# Patient Record
Sex: FEMALE | Race: WHITE | Employment: UNEMPLOYED | URBAN - NONMETROPOLITAN AREA
[De-identification: names, ages, dates, MRNs, and addresses within clinical notes are randomized per-mention and may not be internally consistent; named-entity substitution may affect disease eponyms.]

---

## 2019-12-27 ENCOUNTER — HOSPITAL ENCOUNTER (EMERGENCY)
Age: 54
Discharge: HOME | End: 2019-12-27
Payer: MEDICAID

## 2019-12-27 VITALS
BODY MASS INDEX: 21.14 KG/M2 | DIASTOLIC BLOOD PRESSURE: 60 MMHG | TEMPERATURE: 98.5 F | HEART RATE: 80 BPM | HEIGHT: 61 IN | RESPIRATION RATE: 20 BRPM | WEIGHT: 112 LBS | SYSTOLIC BLOOD PRESSURE: 93 MMHG | OXYGEN SATURATION: 91 %

## 2019-12-27 DIAGNOSIS — N30.00 ACUTE CYSTITIS WITHOUT HEMATURIA: Primary | ICD-10-CM

## 2019-12-27 LAB
ALBUMIN SERPL-MCNC: 3.4 G/DL (ref 3.5–5.2)
ALP BLD-CCNC: 72 U/L (ref 35–104)
ALT SERPL-CCNC: 10 U/L (ref 5–33)
ANION GAP SERPL CALCULATED.3IONS-SCNC: 11 MMOL/L (ref 7–19)
AST SERPL-CCNC: 19 U/L (ref 5–32)
BACTERIA: ABNORMAL /HPF
BASOPHILS ABSOLUTE: 0.1 K/UL (ref 0–0.2)
BASOPHILS RELATIVE PERCENT: 0.9 % (ref 0–1)
BILIRUB SERPL-MCNC: <0.2 MG/DL (ref 0.2–1.2)
BILIRUBIN URINE: NEGATIVE
BLOOD, URINE: ABNORMAL
BUN BLDV-MCNC: 14 MG/DL (ref 6–20)
CALCIUM SERPL-MCNC: 8.8 MG/DL (ref 8.6–10)
CHLORIDE BLD-SCNC: 103 MMOL/L (ref 98–111)
CLARITY: ABNORMAL
CO2: 24 MMOL/L (ref 22–29)
COLOR: ABNORMAL
CREAT SERPL-MCNC: 0.8 MG/DL (ref 0.5–0.9)
EOSINOPHILS ABSOLUTE: 0.2 K/UL (ref 0–0.6)
EOSINOPHILS RELATIVE PERCENT: 3.1 % (ref 0–5)
EPITHELIAL CELLS, UA: 1 /HPF (ref 0–5)
GFR NON-AFRICAN AMERICAN: >60
GLUCOSE BLD-MCNC: 91 MG/DL (ref 74–109)
GLUCOSE URINE: NEGATIVE MG/DL
HCT VFR BLD CALC: 33.6 % (ref 37–47)
HEMOGLOBIN: 10.5 G/DL (ref 12–16)
HYALINE CASTS: 0 /HPF (ref 0–8)
IMMATURE GRANULOCYTES #: 0 K/UL
KETONES, URINE: NEGATIVE MG/DL
LACTIC ACID: 0.8 MMOL/L (ref 0.5–1.9)
LEUKOCYTE ESTERASE, URINE: ABNORMAL
LYMPHOCYTES ABSOLUTE: 2.4 K/UL (ref 1.1–4.5)
LYMPHOCYTES RELATIVE PERCENT: 37.6 % (ref 20–40)
MCH RBC QN AUTO: 28.5 PG (ref 27–31)
MCHC RBC AUTO-ENTMCNC: 31.3 G/DL (ref 33–37)
MCV RBC AUTO: 91.3 FL (ref 81–99)
MONOCYTES ABSOLUTE: 0.7 K/UL (ref 0–0.9)
MONOCYTES RELATIVE PERCENT: 11.2 % (ref 0–10)
NEUTROPHILS ABSOLUTE: 3 K/UL (ref 1.5–7.5)
NEUTROPHILS RELATIVE PERCENT: 46.7 % (ref 50–65)
NITRITE, URINE: POSITIVE
PDW BLD-RTO: 12.7 % (ref 11.5–14.5)
PH UA: 6 (ref 5–8)
PLATELET # BLD: 198 K/UL (ref 130–400)
PMV BLD AUTO: 8.6 FL (ref 9.4–12.3)
POTASSIUM REFLEX MAGNESIUM: 4.5 MMOL/L (ref 3.5–5)
PROTEIN UA: NEGATIVE MG/DL
RBC # BLD: 3.68 M/UL (ref 4.2–5.4)
RBC UA: 3 /HPF (ref 0–4)
SODIUM BLD-SCNC: 138 MMOL/L (ref 136–145)
SPECIFIC GRAVITY UA: 1.02 (ref 1–1.03)
TOTAL PROTEIN: 6.2 G/DL (ref 6.6–8.7)
URINE REFLEX TO CULTURE: YES
UROBILINOGEN, URINE: 0.2 E.U./DL
WBC # BLD: 6.4 K/UL (ref 4.8–10.8)
WBC UA: 267 /HPF (ref 0–5)

## 2019-12-27 PROCEDURE — 81001 URINALYSIS AUTO W/SCOPE: CPT

## 2019-12-27 PROCEDURE — 83605 ASSAY OF LACTIC ACID: CPT

## 2019-12-27 PROCEDURE — 87086 URINE CULTURE/COLONY COUNT: CPT

## 2019-12-27 PROCEDURE — 96374 THER/PROPH/DIAG INJ IV PUSH: CPT

## 2019-12-27 PROCEDURE — 96375 TX/PRO/DX INJ NEW DRUG ADDON: CPT

## 2019-12-27 PROCEDURE — 6360000002 HC RX W HCPCS: Performed by: NURSE PRACTITIONER

## 2019-12-27 PROCEDURE — 99283 EMERGENCY DEPT VISIT LOW MDM: CPT

## 2019-12-27 PROCEDURE — 2580000003 HC RX 258: Performed by: NURSE PRACTITIONER

## 2019-12-27 PROCEDURE — 85025 COMPLETE CBC W/AUTO DIFF WBC: CPT

## 2019-12-27 PROCEDURE — 80053 COMPREHEN METABOLIC PANEL: CPT

## 2019-12-27 PROCEDURE — 87186 SC STD MICRODIL/AGAR DIL: CPT

## 2019-12-27 PROCEDURE — 36415 COLL VENOUS BLD VENIPUNCTURE: CPT

## 2019-12-27 RX ORDER — MORPHINE SULFATE 4 MG/ML
2 INJECTION, SOLUTION INTRAMUSCULAR; INTRAVENOUS ONCE
Status: COMPLETED | OUTPATIENT
Start: 2019-12-27 | End: 2019-12-27

## 2019-12-27 RX ORDER — CEFDINIR 300 MG/1
300 CAPSULE ORAL 2 TIMES DAILY
Qty: 20 CAPSULE | Refills: 0 | Status: SHIPPED | OUTPATIENT
Start: 2019-12-27 | End: 2020-01-06

## 2019-12-27 RX ORDER — ONDANSETRON 4 MG/1
4 TABLET, ORALLY DISINTEGRATING ORAL 3 TIMES DAILY PRN
Qty: 21 TABLET | Refills: 0 | Status: SHIPPED | OUTPATIENT
Start: 2019-12-27 | End: 2021-05-20

## 2019-12-27 RX ORDER — 0.9 % SODIUM CHLORIDE 0.9 %
1000 INTRAVENOUS SOLUTION INTRAVENOUS ONCE
Status: COMPLETED | OUTPATIENT
Start: 2019-12-27 | End: 2019-12-27

## 2019-12-27 RX ORDER — CLONAZEPAM 0.5 MG/1
1 TABLET ORAL 2 TIMES DAILY PRN
COMMUNITY

## 2019-12-27 RX ORDER — ONDANSETRON 2 MG/ML
4 INJECTION INTRAMUSCULAR; INTRAVENOUS ONCE
Status: COMPLETED | OUTPATIENT
Start: 2019-12-27 | End: 2019-12-27

## 2019-12-27 RX ORDER — ZOLPIDEM TARTRATE 10 MG/1
TABLET ORAL NIGHTLY PRN
COMMUNITY
End: 2021-01-10

## 2019-12-27 RX ADMIN — CEFTRIAXONE 1 G: 1 INJECTION, POWDER, FOR SOLUTION INTRAMUSCULAR; INTRAVENOUS at 18:53

## 2019-12-27 RX ADMIN — ONDANSETRON 4 MG: 2 INJECTION INTRAMUSCULAR; INTRAVENOUS at 18:10

## 2019-12-27 RX ADMIN — MORPHINE SULFATE 2 MG: 4 INJECTION, SOLUTION INTRAMUSCULAR; INTRAVENOUS at 18:52

## 2019-12-27 RX ADMIN — SODIUM CHLORIDE 1000 ML: 9 INJECTION, SOLUTION INTRAVENOUS at 18:10

## 2019-12-27 SDOH — HEALTH STABILITY: MENTAL HEALTH: HOW OFTEN DO YOU HAVE A DRINK CONTAINING ALCOHOL?: NEVER

## 2019-12-27 ASSESSMENT — ENCOUNTER SYMPTOMS
SORE THROAT: 0
ALLERGIC/IMMUNOLOGIC NEGATIVE: 1
EYE DISCHARGE: 0
ABDOMINAL DISTENTION: 0
NAUSEA: 1
BLOOD IN STOOL: 0
EYE PAIN: 0
TROUBLE SWALLOWING: 0
PHOTOPHOBIA: 0
SINUS PAIN: 0
SHORTNESS OF BREATH: 0
DIARRHEA: 0
ABDOMINAL PAIN: 0
EYE REDNESS: 0
STRIDOR: 0
VOMITING: 0
BACK PAIN: 0
EYE ITCHING: 0
CONSTIPATION: 0
COLOR CHANGE: 0
WHEEZING: 0
CHEST TIGHTNESS: 0

## 2019-12-27 ASSESSMENT — PAIN SCALES - GENERAL
PAINLEVEL_OUTOF10: 10
PAINLEVEL_OUTOF10: 10

## 2019-12-29 LAB
ORGANISM: ABNORMAL
URINE CULTURE, ROUTINE: ABNORMAL
URINE CULTURE, ROUTINE: ABNORMAL

## 2020-07-18 ENCOUNTER — HOSPITAL ENCOUNTER (EMERGENCY)
Age: 55
Discharge: HOME | End: 2020-07-18
Attending: EMERGENCY MEDICINE
Payer: MEDICAID

## 2020-07-18 VITALS
DIASTOLIC BLOOD PRESSURE: 81 MMHG | HEART RATE: 82 BPM | TEMPERATURE: 98.3 F | SYSTOLIC BLOOD PRESSURE: 104 MMHG | RESPIRATION RATE: 18 BRPM | HEIGHT: 61 IN | BODY MASS INDEX: 19.83 KG/M2 | OXYGEN SATURATION: 98 % | WEIGHT: 105 LBS

## 2020-07-18 PROCEDURE — 99283 EMERGENCY DEPT VISIT LOW MDM: CPT

## 2020-07-18 PROCEDURE — 96372 THER/PROPH/DIAG INJ SC/IM: CPT

## 2020-07-18 PROCEDURE — 6360000002 HC RX W HCPCS: Performed by: EMERGENCY MEDICINE

## 2020-07-18 RX ORDER — CYCLOBENZAPRINE HCL 10 MG
10 TABLET ORAL 3 TIMES DAILY PRN
Qty: 30 TABLET | Refills: 0 | Status: SHIPPED | OUTPATIENT
Start: 2020-07-18 | End: 2020-07-28

## 2020-07-18 RX ORDER — ORPHENADRINE CITRATE 30 MG/ML
60 INJECTION INTRAMUSCULAR; INTRAVENOUS ONCE
Status: COMPLETED | OUTPATIENT
Start: 2020-07-18 | End: 2020-07-18

## 2020-07-18 RX ORDER — TRIAMCINOLONE ACETONIDE 40 MG/ML
40 INJECTION, SUSPENSION INTRA-ARTICULAR; INTRAMUSCULAR ONCE
Status: COMPLETED | OUTPATIENT
Start: 2020-07-18 | End: 2020-07-18

## 2020-07-18 RX ORDER — HYDROCODONE BITARTRATE AND ACETAMINOPHEN 5; 325 MG/1; MG/1
1 TABLET ORAL EVERY 6 HOURS PRN
Qty: 12 TABLET | Refills: 0 | Status: SHIPPED | OUTPATIENT
Start: 2020-07-18 | End: 2020-07-21

## 2020-07-18 RX ORDER — HYDROMORPHONE HYDROCHLORIDE 1 MG/ML
1 INJECTION, SOLUTION INTRAMUSCULAR; INTRAVENOUS; SUBCUTANEOUS ONCE
Status: COMPLETED | OUTPATIENT
Start: 2020-07-18 | End: 2020-07-18

## 2020-07-18 RX ADMIN — HYDROMORPHONE HYDROCHLORIDE 1 MG: 1 INJECTION, SOLUTION INTRAMUSCULAR; INTRAVENOUS; SUBCUTANEOUS at 08:09

## 2020-07-18 RX ADMIN — TRIAMCINOLONE ACETONIDE 40 MG: 40 INJECTION, SUSPENSION INTRA-ARTICULAR; INTRAMUSCULAR at 08:07

## 2020-07-18 RX ADMIN — ORPHENADRINE CITRATE 60 MG: 30 INJECTION INTRAMUSCULAR; INTRAVENOUS at 08:06

## 2020-07-18 ASSESSMENT — PAIN SCALES - GENERAL
PAINLEVEL_OUTOF10: 10
PAINLEVEL_OUTOF10: 10

## 2020-07-18 ASSESSMENT — ENCOUNTER SYMPTOMS
ABDOMINAL PAIN: 0
DIARRHEA: 0
RHINORRHEA: 0
SHORTNESS OF BREATH: 0
SORE THROAT: 0
BACK PAIN: 1
VOMITING: 0
NAUSEA: 0

## 2020-07-18 NOTE — ED NOTES
Bed: 09  Expected date:   Expected time:   Means of arrival:   Comments:     Jaylen Pop RN  07/18/20 8017

## 2020-08-18 ENCOUNTER — OFFICE VISIT (OUTPATIENT)
Age: 55
End: 2020-08-18

## 2020-08-18 VITALS — TEMPERATURE: 98 F | HEART RATE: 91 BPM | OXYGEN SATURATION: 91 %

## 2020-08-19 LAB — SARS-COV-2, NAA: NOT DETECTED

## 2020-09-12 ENCOUNTER — APPOINTMENT (OUTPATIENT)
Dept: CT IMAGING | Age: 55
End: 2020-09-12
Payer: MEDICAID

## 2020-09-12 ENCOUNTER — HOSPITAL ENCOUNTER (EMERGENCY)
Age: 55
Discharge: HOME | End: 2020-09-12
Attending: EMERGENCY MEDICINE
Payer: MEDICAID

## 2020-09-12 VITALS
HEIGHT: 61 IN | DIASTOLIC BLOOD PRESSURE: 71 MMHG | RESPIRATION RATE: 16 BRPM | HEART RATE: 78 BPM | BODY MASS INDEX: 19.83 KG/M2 | TEMPERATURE: 98.6 F | OXYGEN SATURATION: 95 % | SYSTOLIC BLOOD PRESSURE: 130 MMHG | WEIGHT: 105 LBS

## 2020-09-12 LAB
ALBUMIN SERPL-MCNC: 4.1 G/DL (ref 3.5–5.2)
ALP BLD-CCNC: 79 U/L (ref 35–104)
ALT SERPL-CCNC: 9 U/L (ref 5–33)
ANION GAP SERPL CALCULATED.3IONS-SCNC: 8 MMOL/L (ref 7–19)
AST SERPL-CCNC: 20 U/L (ref 5–32)
BACTERIA: ABNORMAL /HPF
BASOPHILS ABSOLUTE: 0.1 K/UL (ref 0–0.2)
BASOPHILS RELATIVE PERCENT: 0.4 % (ref 0–1)
BILIRUB SERPL-MCNC: <0.2 MG/DL (ref 0.2–1.2)
BILIRUBIN URINE: NEGATIVE
BLOOD, URINE: NEGATIVE
BUN BLDV-MCNC: 13 MG/DL (ref 6–20)
CALCIUM SERPL-MCNC: 9.1 MG/DL (ref 8.6–10)
CHLORIDE BLD-SCNC: 104 MMOL/L (ref 98–111)
CLARITY: ABNORMAL
CO2: 27 MMOL/L (ref 22–29)
COLOR: YELLOW
CREAT SERPL-MCNC: 0.7 MG/DL (ref 0.5–0.9)
CRYSTALS, UA: ABNORMAL /HPF
EOSINOPHILS ABSOLUTE: 0.1 K/UL (ref 0–0.6)
EOSINOPHILS RELATIVE PERCENT: 1.1 % (ref 0–5)
GFR AFRICAN AMERICAN: >59
GFR NON-AFRICAN AMERICAN: >60
GLUCOSE BLD-MCNC: 99 MG/DL (ref 74–109)
GLUCOSE URINE: NEGATIVE MG/DL
HCT VFR BLD CALC: 38.8 % (ref 37–47)
HEMOGLOBIN: 12.5 G/DL (ref 12–16)
IMMATURE GRANULOCYTES #: 0.1 K/UL
KETONES, URINE: NEGATIVE MG/DL
LEUKOCYTE ESTERASE, URINE: ABNORMAL
LIPASE: 26 U/L (ref 13–60)
LYMPHOCYTES ABSOLUTE: 2 K/UL (ref 1.1–4.5)
LYMPHOCYTES RELATIVE PERCENT: 15.7 % (ref 20–40)
MCH RBC QN AUTO: 28.2 PG (ref 27–31)
MCHC RBC AUTO-ENTMCNC: 32.2 G/DL (ref 33–37)
MCV RBC AUTO: 87.4 FL (ref 81–99)
MONOCYTES ABSOLUTE: 0.9 K/UL (ref 0–0.9)
MONOCYTES RELATIVE PERCENT: 7.2 % (ref 0–10)
MUCUS: ABNORMAL /LPF
NEUTROPHILS ABSOLUTE: 9.5 K/UL (ref 1.5–7.5)
NEUTROPHILS RELATIVE PERCENT: 75.1 % (ref 50–65)
NITRITE, URINE: NEGATIVE
PDW BLD-RTO: 13.2 % (ref 11.5–14.5)
PH UA: 5.5 (ref 5–8)
PLATELET # BLD: 329 K/UL (ref 130–400)
PMV BLD AUTO: 8.3 FL (ref 9.4–12.3)
POTASSIUM REFLEX MAGNESIUM: 4 MMOL/L (ref 3.5–5)
PROTEIN UA: ABNORMAL MG/DL
RBC # BLD: 4.44 M/UL (ref 4.2–5.4)
RBC UA: ABNORMAL /HPF (ref 0–2)
SODIUM BLD-SCNC: 139 MMOL/L (ref 136–145)
SPECIFIC GRAVITY UA: 1.03 (ref 1–1.03)
TOTAL PROTEIN: 7.1 G/DL (ref 6.6–8.7)
UROBILINOGEN, URINE: 0.2 E.U./DL
WBC # BLD: 12.7 K/UL (ref 4.8–10.8)
WBC UA: ABNORMAL /HPF (ref 0–5)

## 2020-09-12 PROCEDURE — 36415 COLL VENOUS BLD VENIPUNCTURE: CPT

## 2020-09-12 PROCEDURE — 87086 URINE CULTURE/COLONY COUNT: CPT

## 2020-09-12 PROCEDURE — 87186 SC STD MICRODIL/AGAR DIL: CPT

## 2020-09-12 PROCEDURE — 85025 COMPLETE CBC W/AUTO DIFF WBC: CPT

## 2020-09-12 PROCEDURE — 99999 PR OFFICE/OUTPT VISIT,PROCEDURE ONLY: CPT | Performed by: EMERGENCY MEDICINE

## 2020-09-12 PROCEDURE — 81001 URINALYSIS AUTO W/SCOPE: CPT

## 2020-09-12 PROCEDURE — 99283 EMERGENCY DEPT VISIT LOW MDM: CPT

## 2020-09-12 PROCEDURE — 80053 COMPREHEN METABOLIC PANEL: CPT

## 2020-09-12 PROCEDURE — 87077 CULTURE AEROBIC IDENTIFY: CPT

## 2020-09-12 PROCEDURE — 83690 ASSAY OF LIPASE: CPT

## 2020-09-12 ASSESSMENT — ENCOUNTER SYMPTOMS
DIARRHEA: 0
NAUSEA: 1
RHINORRHEA: 0
ABDOMINAL PAIN: 1
COUGH: 0
VOICE CHANGE: 0
EYE REDNESS: 0
VOMITING: 0
EYE PAIN: 0
SHORTNESS OF BREATH: 0

## 2020-09-12 ASSESSMENT — PAIN SCALES - GENERAL: PAINLEVEL_OUTOF10: 5

## 2020-09-12 NOTE — ED PROVIDER NOTES
[facility] EMERGENCY DEPT  EMERGENCY DEPARTMENT ENCOUNTER      Pt Name: Ivette Capellan  MRN: [id number]  [place] 1965  Date of evaluation: 9/12/2020  Provider: Cristhian Vergara MD    CHIEF COMPLAINT       Chief Complaint   Patient presents with    Abdominal Pain     x 1 year worse today. HISTORY OF PRESENT ILLNESS   (Location/Symptom, Timing/Onset,Context/Setting, Quality, Duration, Modifying Factors, Severity)  Note limiting factors. Ivette Capellan is a 48 y.o. female who presents to the emergency department with complaint of epigastric abdominal pain. States she has had pain in this location along with what she feels like was a palpable knot since August of last year. States the pain she is experiencing since last night is in the same location and feels similar but is more severe. Has some associated nausea but no vomiting. No constipation, diarrhea, fever. States pain has typically worsened after eating but otherwise no specific triggers. HPI    NursingNotes were reviewed. REVIEW OF SYSTEMS    (2-9 systems for level 4, 10 or more for level 5)     Review of Systems   Constitutional: Negative for fatigue and fever. HENT: Negative for congestion, rhinorrhea and voice change. Eyes: Negative for pain and redness. Respiratory: Negative for cough and shortness of breath. Cardiovascular: Negative for chest pain. Gastrointestinal: Positive for abdominal pain and nausea. Negative for diarrhea and vomiting. Endocrine: Negative. Genitourinary: Negative. Musculoskeletal: Negative for arthralgias and gait problem. Skin: Negative for rash and wound. Neurological: Negative for weakness and headaches. Hematological: Negative. Psychiatric/Behavioral: Negative. All other systems reviewed and are negative. A complete review of systems was performed and is negative except as noted above in the HPI.        PAST MEDICAL HISTORY     Past Medical History:   Diagnosis Date    COPD (chronic obstructive pulmonary disease) ([facility] [address]     Scoliosis          SURGICAL HISTORY     No past surgical history on file. CURRENT MEDICATIONS       Discharge Medication List as of 9/12/2020  3:31 PM      CONTINUE these medications which have NOT CHANGED    Details   zolpidem (AMBIEN) 10 MG tablet Take by mouth nightly as needed for Sleep. Historical Med      clonazePAM (KLONOPIN) 0.5 MG tablet Take 1 mg by mouth 2 times daily as needed. Historical Med      ondansetron (ZOFRAN-ODT) 4 MG disintegrating tablet Take 1 tablet by mouth 3 times daily as needed for Nausea or Vomiting, Disp-21 tablet, R-0Print             ALLERGIES     Aspirin; Ibuprofen; Iodine; and Sulfa antibiotics    FAMILY HISTORY     No family history on file.        SOCIAL HISTORY       Social History     Socioeconomic History    Marital status:      Spouse name: Not on file    Number of children: Not on file    Years of education: Not on file    Highest education level: Not on file   Occupational History    Not on file   Social Needs    Financial resource strain: Not on file    Food insecurity     Worry: Not on file     Inability: Not on file    Transportation needs     Medical: Not on file     Non-medical: Not on file   Tobacco Use    Smoking status: Current Every Day Smoker     Packs/day: 0.50    Smokeless tobacco: Never Used   Substance and Sexual Activity    Alcohol use: Never     Frequency: Never    Drug use: Never    Sexual activity: Not on file   Lifestyle    Physical activity     Days per week: Not on file     Minutes per session: Not on file    Stress: Not on file   Relationships    Social connections     Talks on phone: Not on file     Gets together: Not on file     Attends Druze service: Not on file     Active member of club or organization: Not on file     Attends meetings of clubs or organizations: Not on file     Relationship status: Not on file    Intimate partner violence     Fear of current or ex partner: Not on file     Emotionally abused: Not on file     Physically abused: Not on file     Forced sexual activity: Not on file   Other Topics Concern    Not on file   Social History Narrative    Not on file       SCREENINGS             PHYSICAL EXAM    (up to 7 for level 4, 8 or more for level 5)     ED Triage Vitals   BP Temp Temp Source Pulse Resp SpO2 Height Weight   09/12/20 1203 09/12/20 1201 09/12/20 1201 09/12/20 1201 09/12/20 1201 09/12/20 1201 09/12/20 1201 09/12/20 1201   130/71 98.6 °F (37 °C) Oral 78 16 95 % 5' 1\" (1.549 m) 105 lb (47.6 kg)       Physical Exam  Vitals signs and nursing note reviewed. Constitutional:       General: She is not in acute distress. Appearance: Normal appearance. She is well-developed. She is not diaphoretic. HENT:      Head: Normocephalic and atraumatic. Mouth/Throat:      Pharynx: No oropharyngeal exudate. Eyes:      General: No scleral icterus. Pupils: Pupils are equal, round, and reactive to light. Neck:      Musculoskeletal: Normal range of motion. Trachea: No tracheal deviation. Cardiovascular:      Rate and Rhythm: Normal rate. Pulses: Normal pulses. Heart sounds: Normal heart sounds. Pulmonary:      Effort: Pulmonary effort is normal.      Breath sounds: Normal breath sounds. No stridor. No wheezing or rhonchi. Abdominal:      General: There is no distension. Palpations: Abdomen is soft. Abdomen is not rigid. Tenderness: There is abdominal tenderness in the epigastric area. There is no guarding. Hernia: No hernia is present. Musculoskeletal:         General: No deformity. Skin:     General: Skin is warm and dry. Findings: No rash. Neurological:      Mental Status: She is alert and oriented to person, place, and time. Cranial Nerves: No cranial nerve deficit. Coordination: Coordination normal.   Psychiatric:         Behavior: Behavior normal.         DIAGNOSTIC RESULTS     EKG:  All mildly elevated. After CT order was placed, patient eloped from the emergency department. CONSULTS:  None    PROCEDURES:  Unless otherwise notedbelow, none     Procedures      FINAL IMPRESSION     1. Abdominal pain, epigastric          DISPOSITION/PLAN   DISPOSITION        PATIENT REFERRED TO:  No follow-up provider specified.     DISCHARGE MEDICATIONS:  Discharge Medication List as of 9/12/2020  3:31 PM             (Please note that portions of this note were completed with a voice recognition program.  Efforts were made to edit the dictations butoccasionally words are mis-transcribed.)    Karo Sibley MD (electronically signed)  AttendingEmergency Physician          Karo Sibley., MD  09/12/20 1340

## 2020-09-12 NOTE — ED NOTES
ASSESSMENT:    SKIN:  Warm, dry, pink. Cap refill < 2 sec  CARDIAC:  S1 S2 noted  LUNGS: clear upper and lower lobes. Respirations even and unlabored. ABDOMEN: bowel sounds noted upper and lower quadrants. Diffuse abd pain, tender x 1 week. Pt co n/v/d. EXTREMITIES: bilateral DP and PT. No edema noted. Pt alert and oriented x4. Pupils equal and reactive. No distress noted. Side rails up and call light within reach.          Shirin Seals RN  09/12/20 0533

## 2020-09-14 LAB
ORGANISM: ABNORMAL
URINE CULTURE, ROUTINE: ABNORMAL
URINE CULTURE, ROUTINE: ABNORMAL

## 2020-09-21 ENCOUNTER — OFFICE VISIT (OUTPATIENT)
Dept: INTERNAL MEDICINE | Facility: CLINIC | Age: 55
End: 2020-09-21

## 2020-09-21 VITALS
HEIGHT: 61 IN | TEMPERATURE: 98.6 F | BODY MASS INDEX: 17.37 KG/M2 | OXYGEN SATURATION: 99 % | DIASTOLIC BLOOD PRESSURE: 62 MMHG | SYSTOLIC BLOOD PRESSURE: 98 MMHG | WEIGHT: 92 LBS | HEART RATE: 82 BPM

## 2020-09-21 DIAGNOSIS — R19.7 DIARRHEA, UNSPECIFIED TYPE: ICD-10-CM

## 2020-09-21 DIAGNOSIS — R11.0 NAUSEA: ICD-10-CM

## 2020-09-21 DIAGNOSIS — R10.9 RIGHT-SIDED ABDOMINAL PAIN OF UNKNOWN ETIOLOGY: Primary | ICD-10-CM

## 2020-09-21 PROBLEM — F41.1 GENERALIZED ANXIETY DISORDER: Status: ACTIVE | Noted: 2020-09-21

## 2020-09-21 PROBLEM — Z86.59 HISTORY OF PANIC ATTACKS: Status: ACTIVE | Noted: 2020-09-21

## 2020-09-21 PROBLEM — J41.0 SIMPLE CHRONIC BRONCHITIS (HCC): Status: ACTIVE | Noted: 2020-09-21

## 2020-09-21 PROBLEM — Z78.0 OSTEOPENIA AFTER MENOPAUSE: Status: ACTIVE | Noted: 2020-09-21

## 2020-09-21 PROBLEM — G47.9 SLEEP DISTURBANCES: Status: ACTIVE | Noted: 2020-09-21

## 2020-09-21 PROBLEM — M85.80 OSTEOPENIA AFTER MENOPAUSE: Status: ACTIVE | Noted: 2020-09-21

## 2020-09-21 LAB
BILIRUB BLD-MCNC: NEGATIVE MG/DL
CLARITY, POC: CLEAR
COLOR UR: YELLOW
GLUCOSE UR STRIP-MCNC: NEGATIVE MG/DL
KETONES UR QL: NEGATIVE
LEUKOCYTE EST, POC: NEGATIVE
NITRITE UR-MCNC: NEGATIVE MG/ML
PH UR: 5.5 [PH] (ref 5–8)
PROT UR STRIP-MCNC: NEGATIVE MG/DL
RBC # UR STRIP: NEGATIVE /UL
SP GR UR: 1.02 (ref 1–1.03)
UROBILINOGEN UR QL: NORMAL

## 2020-09-21 PROCEDURE — 99204 OFFICE O/P NEW MOD 45 MIN: CPT | Performed by: NURSE PRACTITIONER

## 2020-09-21 RX ORDER — CLONAZEPAM 1 MG/1
1 TABLET ORAL 2 TIMES DAILY PRN
COMMUNITY
Start: 2020-07-14 | End: 2020-10-06 | Stop reason: SDUPTHER

## 2020-09-21 RX ORDER — ZOLPIDEM TARTRATE 10 MG/1
1 TABLET ORAL NIGHTLY
COMMUNITY
End: 2020-10-06 | Stop reason: SDUPTHER

## 2020-09-21 RX ORDER — TIOTROPIUM BROMIDE 18 UG/1
1 CAPSULE ORAL; RESPIRATORY (INHALATION) DAILY
COMMUNITY
Start: 2020-06-16 | End: 2020-10-06

## 2020-09-21 RX ORDER — ALBUTEROL SULFATE 90 UG/1
2 AEROSOL, METERED RESPIRATORY (INHALATION) DAILY
COMMUNITY
Start: 2020-07-13 | End: 2020-10-08 | Stop reason: SDUPTHER

## 2020-09-21 RX ORDER — ALENDRONATE SODIUM 70 MG/1
1 TABLET ORAL WEEKLY
COMMUNITY
Start: 2020-06-16 | End: 2020-10-06 | Stop reason: SDUPTHER

## 2020-09-21 NOTE — PROGRESS NOTES
"Subjective   Carol Lawler is a 54 y.o. female.   Chief Complaint   Patient presents with   • Establish Care   • Abdominal Pain     Recent symptoms started about 1 week ago. ER visit on 9/12/20       Mrs. Lawler is a 55 yo female who present to the office today to establish care, but also reports acute abdominal pain, worsening within the last couple of weeks. Patient was seen at [facility] ED 9/12 with persistent abdominal pain, nausea, and pain with eating. Patient's labs indicated a elevated WBC count (12.7) and urine positive for WBC, protein, cloudy color, and culture indicated Klebsiella pneumoniae. Her urinary tract infection was treated with Keflex. Patient however left the ED against medical advice and did not complete CT of abdomen ordered by ED physician. Patient reports similar pain today \" but much worse\". Patient reports that her pain is described as achy, cramping with intermittent sharp, shooting pains from the Right upper quadrant to the Right lower quadrant. Patient reports diarrhea has been intermittent pattern for the last 2 weeks, but worsened over the last several days. She is reporting 10-15 yellow,watery stools. Patient reports that she is only eating baby food, grapes, and apple sauce. Patient reports that she had tried to eat 1 taco with cheese, ground beef, and lettuce and reported severe pain within 20-30 minutes of eating. Patient denies GERD symptoms. Patient denies C.Diff or MRSA infections in the past. Patient denies alcohol use and denies OTC supplementation.       Patient is requesting Klonopin refill today. According to the [facility] record, patient had a refill from her Primary Care physician in [facility], but is seeing Dr. Bowen for suboxone treatment to help her off Klonopin per patient report after further assessed. Patient reports follow up with Dr. Bowen office next week.        The following portions of the patient's history were reviewed and updated as appropriate: allergies, " current medications, past family history, past medical history, past social history, past surgical history and problem list.    Review of Systems   Constitutional: Negative for activity change, appetite change, fatigue, fever, unexpected weight gain and unexpected weight loss.   HENT: Negative for swollen glands, trouble swallowing and voice change.    Eyes: Negative for blurred vision and visual disturbance.   Respiratory: Negative for cough and shortness of breath.    Cardiovascular: Negative for chest pain, palpitations and leg swelling.   Gastrointestinal: Positive for abdominal pain, diarrhea, nausea and indigestion. Negative for constipation and vomiting.   Endocrine: Negative for cold intolerance, heat intolerance, polydipsia and polyphagia.   Genitourinary: Negative for dysuria and frequency.   Musculoskeletal: Negative for arthralgias, back pain, joint swelling and neck pain.   Skin: Negative for color change, rash and skin lesions.   Neurological: Negative for dizziness, weakness, headache, memory problem and confusion.   Hematological: Does not bruise/bleed easily.   Psychiatric/Behavioral: Positive for sleep disturbance. Negative for agitation, hallucinations and suicidal ideas. The patient is nervous/anxious.        Objective   Past Medical History:   Diagnosis Date   • Anxiety    • COPD (chronic obstructive pulmonary disease) (CMS/[facility])    • Insomnia    • Osteoporosis       Past Surgical History:   Procedure Laterality Date   • TONSILLECTOMY     • TUBAL ABDOMINAL LIGATION          Current Outpatient Medications:   •  albuterol sulfate  (90 Base) MCG/ACT inhaler, Inhale 2 puffs Daily., Disp: , Rfl:   •  alendronate (FOSAMAX) 70 MG tablet, Take 1 tablet by mouth 1 (One) Time Per Week., Disp: , Rfl:   •  clonazePAM (KlonoPIN) 1 MG tablet, Take 1 tablet by mouth 2 (Two) Times a Day As Needed., Disp: , Rfl:   •  Spiriva HandiHaler 18 MCG per inhalation capsule, Take 1 puff by mouth Daily., Disp: ,  Rfl:   •  zolpidem (AMBIEN) 10 MG tablet, Take 1 tablet by mouth Every Night., Disp: , Rfl:      Vitals:    09/21/20 1007   BP: 98/62   Pulse: 82   Temp: 98.6 °F (37 °C)   SpO2: 99%         09/21/20  1007   Weight: 41.7 kg (92 lb)     Patient's Body mass index is 17.38 kg/m². BMI is       Physical Exam  Vitals signs and nursing note reviewed.   Constitutional:       General: She is in acute distress.      Appearance: She is well-developed and underweight.   HENT:      Head: Normocephalic and atraumatic.      Right Ear: Hearing and external ear normal.      Left Ear: Hearing and external ear normal.      Nose: Nose normal.      Mouth/Throat:      Lips: Pink. No lesions.      Mouth: Mucous membranes are moist.      Pharynx: Oropharynx is clear. Uvula midline.   Eyes:      Conjunctiva/sclera: Conjunctivae normal.      Pupils: Pupils are equal, round, and reactive to light.   Neck:      Musculoskeletal: Normal range of motion and neck supple.      Thyroid: No thyromegaly.   Cardiovascular:      Rate and Rhythm: Normal rate and regular rhythm.      Pulses: Normal pulses.      Heart sounds: Normal heart sounds.   Pulmonary:      Effort: Pulmonary effort is normal. No tachypnea, accessory muscle usage or respiratory distress.      Breath sounds: Examination of the right-lower field reveals decreased breath sounds. Examination of the left-lower field reveals decreased breath sounds. Decreased breath sounds present.   Abdominal:      General: Abdomen is flat. Bowel sounds are normal.      Palpations: Abdomen is soft.      Tenderness: There is abdominal tenderness in the right upper quadrant, right lower quadrant and epigastric area.      Hernia: No hernia is present.       Lymphadenopathy:      Cervical: No cervical adenopathy.   Skin:     General: Skin is warm and dry.      Capillary Refill: Capillary refill takes less than 2 seconds.   Neurological:      Mental Status: She is alert and oriented to person, place, and time.    Psychiatric:         Attention and Perception: Attention normal.         Mood and Affect: Mood is anxious.         Behavior: Behavior normal. Behavior is cooperative.         Thought Content: Thought content normal.         Assessment/Plan   Diagnoses and all orders for this visit:    1. Right-sided abdominal pain of unknown etiology (Primary)  -     POC Urinalysis Dipstick, Multipro    2. Nausea    3. Diarrhea, unspecified type        Will defer refill on Klonopin to Dr. Bowen and staff. Will have patient go to the ED for acute assessment of abdominal pain related to severity of pain and lack of insurance today. I will have the patient return in 1 week to recheck abdominal pain and complete yearly examination without acute condition present.

## 2020-09-24 NOTE — TELEPHONE ENCOUNTER
Caller: Carol Lawler    Relationship: Self    Best call back number:[phone]    Medication needed:   Requested Prescriptions     Pending Prescriptions Disp Refills   • albuterol sulfate  (90 Base) MCG/ACT inhaler        Sig: Inhale 2 puffs Daily.   • alendronate (FOSAMAX) 70 MG tablet        Sig: Take 1 tablet by mouth 1 (One) Time Per Week.   • clonazePAM (KlonoPIN) 1 MG tablet        Sig: Take 1 tablet by mouth 2 (Two) Times a Day As Needed.   • Spiriva HandiHaler 18 MCG per inhalation capsule        Sig: Daily.   • zolpidem (AMBIEN) 10 MG tablet        Sig: Take 1 tablet by mouth Every Night.       What is the patient's preferred pharmacy: [id number] [place] [facility] [address] [phone] [facility] [phone]

## 2020-09-25 RX ORDER — CLONAZEPAM 1 MG/1
1 TABLET ORAL 2 TIMES DAILY PRN
OUTPATIENT
Start: 2020-09-25

## 2020-09-25 RX ORDER — TIOTROPIUM BROMIDE 18 UG/1
1 CAPSULE ORAL; RESPIRATORY (INHALATION) DAILY
Qty: 30 CAPSULE | Refills: 5 | OUTPATIENT
Start: 2020-09-25

## 2020-09-25 RX ORDER — ALENDRONATE SODIUM 70 MG/1
70 TABLET ORAL WEEKLY
Qty: 4 TABLET | Refills: 11 | OUTPATIENT
Start: 2020-09-25

## 2020-09-25 RX ORDER — ZOLPIDEM TARTRATE 10 MG/1
10 TABLET ORAL NIGHTLY
OUTPATIENT
Start: 2020-09-25

## 2020-09-25 RX ORDER — ALBUTEROL SULFATE 90 UG/1
2 AEROSOL, METERED RESPIRATORY (INHALATION) DAILY
Qty: 18 G | Refills: 3 | OUTPATIENT
Start: 2020-09-25

## 2020-10-06 ENCOUNTER — OFFICE VISIT (OUTPATIENT)
Dept: INTERNAL MEDICINE | Facility: CLINIC | Age: 55
End: 2020-10-06

## 2020-10-06 VITALS
DIASTOLIC BLOOD PRESSURE: 78 MMHG | TEMPERATURE: 97.6 F | OXYGEN SATURATION: 98 % | SYSTOLIC BLOOD PRESSURE: 128 MMHG | BODY MASS INDEX: 17.03 KG/M2 | RESPIRATION RATE: 16 BRPM | HEART RATE: 72 BPM | WEIGHT: 90.2 LBS | HEIGHT: 61 IN

## 2020-10-06 DIAGNOSIS — Z12.31 ENCOUNTER FOR SCREENING MAMMOGRAM FOR MALIGNANT NEOPLASM OF BREAST: ICD-10-CM

## 2020-10-06 DIAGNOSIS — F41.1 GENERALIZED ANXIETY DISORDER: ICD-10-CM

## 2020-10-06 DIAGNOSIS — F51.04 PSYCHOPHYSIOLOGICAL INSOMNIA: ICD-10-CM

## 2020-10-06 DIAGNOSIS — Z00.01 ANNUAL VISIT FOR GENERAL ADULT MEDICAL EXAMINATION WITH ABNORMAL FINDINGS: ICD-10-CM

## 2020-10-06 DIAGNOSIS — R10.13 EPIGASTRIC PAIN: Primary | ICD-10-CM

## 2020-10-06 DIAGNOSIS — R10.13 DYSPEPSIA: ICD-10-CM

## 2020-10-06 DIAGNOSIS — R13.19 ESOPHAGEAL DYSPHAGIA: ICD-10-CM

## 2020-10-06 DIAGNOSIS — J41.0 SIMPLE CHRONIC BRONCHITIS (HCC): ICD-10-CM

## 2020-10-06 DIAGNOSIS — M81.6 LOCALIZED OSTEOPOROSIS WITHOUT CURRENT PATHOLOGICAL FRACTURE: ICD-10-CM

## 2020-10-06 PROCEDURE — 99204 OFFICE O/P NEW MOD 45 MIN: CPT | Performed by: INTERNAL MEDICINE

## 2020-10-06 RX ORDER — ZOLPIDEM TARTRATE 10 MG/1
5-10 TABLET ORAL NIGHTLY
Qty: 30 TABLET | Refills: 1 | Status: SHIPPED | OUTPATIENT
Start: 2020-10-06 | End: 2020-12-07 | Stop reason: SDUPTHER

## 2020-10-06 RX ORDER — SERTRALINE HYDROCHLORIDE 25 MG/1
25 TABLET, FILM COATED ORAL DAILY
Qty: 30 TABLET | Refills: 3 | Status: SHIPPED | OUTPATIENT
Start: 2020-10-06 | End: 2020-12-31

## 2020-10-06 RX ORDER — CLONAZEPAM 1 MG/1
.5-1 TABLET ORAL NIGHTLY PRN
Qty: 30 TABLET | Refills: 1 | Status: SHIPPED | OUTPATIENT
Start: 2020-10-06 | End: 2020-12-07 | Stop reason: SDUPTHER

## 2020-10-06 RX ORDER — TIOTROPIUM BROMIDE INHALATION SPRAY 3.12 UG/1
2 SPRAY, METERED RESPIRATORY (INHALATION)
Qty: 4 G | Refills: 5 | Status: SHIPPED | OUTPATIENT
Start: 2020-10-06 | End: 2021-01-14 | Stop reason: SDUPTHER

## 2020-10-06 RX ORDER — ALENDRONATE SODIUM 70 MG/1
70 TABLET ORAL WEEKLY
Qty: 4 TABLET | Refills: 11 | Status: SHIPPED | OUTPATIENT
Start: 2020-10-06 | End: 2021-05-28 | Stop reason: SDUPTHER

## 2020-10-06 RX ORDER — BUPRENORPHINE HYDROCHLORIDE AND NALOXONE HYDROCHLORIDE DIHYDRATE 8; 2 MG/1; MG/1
1 TABLET SUBLINGUAL 3 TIMES DAILY
COMMUNITY
End: 2020-12-31

## 2020-10-06 RX ORDER — OMEPRAZOLE 20 MG/1
20 CAPSULE, DELAYED RELEASE ORAL DAILY
Qty: 14 CAPSULE | Refills: 0 | Status: SHIPPED | OUTPATIENT
Start: 2020-10-06 | End: 2020-12-31 | Stop reason: SDUPTHER

## 2020-10-06 NOTE — PROGRESS NOTES
CC: epigastric pain    History:  Carol Lawler is a 55 y.o. female who presents today for evaluation of the above problems.      She notes she has been doing reasonably well, but has had epigastric pain that waxes and wanes in intensity, but is always worsened by eating.  Certain foods like Mexican always make it worse.  She has had some symptoms of reflux and has had dysphagia, but no odynophagia.  Her weight has been low, but consistent and she denies melena.  She has never taken anything for this and does not note any worsening of her stomach pain with administration of her medications.    She recently moved here from [place] and has transferred her care to Dr. Bowen with regard to her Suboxone.  She had previously used opioids, but had used Suboxone to transition off of these.    Her anxiety has done reasonably well with Klonopin and she notes she has been on this as monotherapy for several years.  She notes she had tried BuSpar and Paxil in the past without relief.    She was noted to have osteoporosis in the past and has been on Fosamax for approximately 2 years.        ROS:  Review of Systems   Constitutional: Negative for chills and fever.   HENT: Negative for congestion and sore throat.    Eyes: Negative for visual disturbance.   Respiratory: Negative for cough and shortness of breath.    Cardiovascular: Negative for chest pain and palpitations.   Gastrointestinal: Positive for abdominal pain. Negative for blood in stool, constipation, diarrhea and nausea.   Endocrine: Negative for cold intolerance and heat intolerance.   Genitourinary: Negative for difficulty urinating and frequency.   Musculoskeletal: Negative for arthralgias and back pain.   Skin: Negative for rash.   Neurological: Negative for dizziness and headaches.   Psychiatric/Behavioral: Negative for dysphoric mood. The patient is nervous/anxious.        Allergies   Allergen Reactions   • Aspirin Hives   • Ibuprofen Unknown - Low Severity   •  "Iodine Unknown - Low Severity   • Sulfa Antibiotics Unknown - Low Severity     Past Medical History:   Diagnosis Date   • Anxiety    • COPD (chronic obstructive pulmonary disease) (CMS/HCC)    • Insomnia    • Osteoporosis      Past Surgical History:   Procedure Laterality Date   • TONSILLECTOMY     • TUBAL ABDOMINAL LIGATION       Family History   Problem Relation Age of Onset   • Diabetes Mother    • Hypertension Mother    • Hyperlipidemia Mother    • Cancer Mother    • Anxiety disorder Father       reports that she has been smoking cigarettes. She has a 30.00 pack-year smoking history. She has never used smokeless tobacco. She reports that she does not drink alcohol or use drugs.      Current Outpatient Medications:   •  albuterol sulfate  (90 Base) MCG/ACT inhaler, Inhale 2 puffs Daily., Disp: , Rfl:   •  alendronate (FOSAMAX) 70 MG tablet, Take 1 tablet by mouth 1 (One) Time Per Week., Disp: , Rfl:   •  buprenorphine-naloxone (SUBOXONE) 8-2 MG per SL tablet, Place 1 tablet under the tongue 3 (Three) Times a Day. 1/2 tablet TID, Disp: , Rfl:   •  clonazePAM (KlonoPIN) 1 MG tablet, Take 1 tablet by mouth 2 (Two) Times a Day As Needed., Disp: , Rfl:   •  Spiriva HandiHaler 18 MCG per inhalation capsule, Take 1 puff by mouth Daily., Disp: , Rfl:   •  zolpidem (AMBIEN) 10 MG tablet, Take 1 tablet by mouth Every Night., Disp: , Rfl:     OBJECTIVE:  /78 (BP Location: Left arm, Patient Position: Sitting, Cuff Size: Adult)   Pulse 72   Temp 97.6 °F (36.4 °C)   Resp 16   Ht 154.9 cm (61\")   Wt 40.9 kg (90 lb 3.2 oz)   SpO2 98%   Breastfeeding No   BMI 17.04 kg/m²    Physical Exam  Constitutional:       General: She is not in acute distress.     Appearance: She is well-developed.   HENT:      Head: Normocephalic and atraumatic.      Right Ear: External ear normal.      Left Ear: External ear normal.   Eyes:      General: No scleral icterus.     Extraocular Movements: Extraocular movements intact. "   Neck:      Musculoskeletal: Normal range of motion and neck supple.      Trachea: No tracheal deviation.   Cardiovascular:      Rate and Rhythm: Normal rate and regular rhythm.      Heart sounds: Normal heart sounds. No murmur.   Pulmonary:      Effort: Pulmonary effort is normal. No accessory muscle usage or respiratory distress.      Breath sounds: Normal breath sounds. No wheezing.   Abdominal:      General: Abdomen is flat. Bowel sounds are normal. There is no distension.      Palpations: Abdomen is soft. There is no mass.      Tenderness: There is abdominal tenderness.   Musculoskeletal: Normal range of motion.      Right lower leg: No edema.      Left lower leg: No edema.   Skin:     General: Skin is warm and dry.      Nails: There is no clubbing.     Neurological:      Mental Status: She is alert and oriented to person, place, and time.      Coordination: Coordination normal.      Gait: Gait normal.   Psychiatric:         Mood and Affect: Mood normal. Mood is not anxious or depressed.         Behavior: Behavior normal.         Assessment/Plan     Diagnoses and all orders for this visit:    Epigastric pain  Dyspepsia  Esophageal dysphagia  -     Ambulatory Referral to Gastroenterology  -     omeprazole (PrilOSEC) 20 MG capsule; Take 1 capsule by mouth Daily.  We will attempt a diagnostic and therapeutic trial with omeprazole.  However, given pain with dysphagia, we will refer to gastroenterology for consideration of probable EGD.    Simple chronic bronchitis (CMS/HCC)  -     tiotropium bromide monohydrate (Spiriva Respimat) 2.5 MCG/ACT aerosol solution inhaler; Inhale 2 puffs Daily.  She has done reasonably well on Spiriva.  Consider PFTs for further characterization if needed.    Generalized anxiety disorder  -     sertraline (Zoloft) 25 MG tablet; Take 1 tablet by mouth Daily.  -     clonazePAM (KlonoPIN) 1 MG tablet; Take 0.5-1 tablets by mouth At Night As Needed for Anxiety.  We discussed that I do not  recommend monotherapy with benzodiazepines given risk for dependence, legal issues, but especially given medical risk for early onset dementia.  As such, we will initiate therapy with sertraline and aim to wean her clonazepam.  Notably, she indicates she uses the clonazepam only at night to help with sleep and has not used it twice daily as previously prescribed. We discussed the controlled nature of this prescription and the necessity of obtaining from only our office, filling at the same pharmacy, and taking only as directed. We will aim to wean over several months. Regarding sertraline, I advised that it will take 3-4 weeks to see some effect and 6-8 weeks to see full effect.  If the dose is ineffective or suboptimal, the patient should notify the clinic for a consideration of a dose increase. The patient denied SI/HI, but was advised that starting this medication could worsen these symptoms. We discussed this as an emergency and reason to seek care immediately. The patient expressed understanding.     Psychophysiological insomnia  -     clonazePAM (KlonoPIN) 1 MG tablet; Take 0.5-1 tablets by mouth At Night As Needed for Anxiety.  -     zolpidem (AMBIEN) 10 MG tablet; Take 0.5-1 tablets by mouth Every Night.  PDMP data was reviewed, but inaccurate in the chart.  As a result, Benja was reviewed with the last prescriptions of clonazepam and Ambien coming from her physician in [place].  We are seeking records from her doctor in [place], [place]. as above, we will prescribe clonazepam and plan to wean.  She may also try to wean her Ambien down to 1/2 tablet if tolerable.    Localized osteoporosis without current pathological fracture  -     alendronate (FOSAMAX) 70 MG tablet; Take 1 tablet by mouth 1 (One) Time Per Week.  Continue Fosamax and we are seeking records from her previous physician, Dr. Trent regarding DEXA.  Plan for 5 years of bisphosphonate therapy.    Encounter for screening mammogram for malignant  neoplasm of breast  -     Mammo Screening Digital Tomosynthesis Bilateral With CAD; Future    Annual visit for general adult medical examination with abnormal findings  -     Comprehensive Metabolic Panel; Future  -     Hemoglobin A1c; Future  -     Lipid Panel; Future  -     Hepatitis C Antibody; Future  -     CBC (No Diff); Future         An After Visit Summary was printed and given to the patient at discharge.  Return in about 2 months (around 12/6/2020) for Annual physical with me.         Manuelito Griffin D.O. 10/7/2020   Electronically signed.

## 2020-10-08 ENCOUNTER — TELEPHONE (OUTPATIENT)
Dept: INTERNAL MEDICINE | Facility: CLINIC | Age: 55
End: 2020-10-08

## 2020-10-08 DIAGNOSIS — J41.0 SIMPLE CHRONIC BRONCHITIS (HCC): Primary | ICD-10-CM

## 2020-10-08 RX ORDER — ALBUTEROL SULFATE 90 UG/1
2 AEROSOL, METERED RESPIRATORY (INHALATION) DAILY
Qty: 8 G | Refills: 11 | OUTPATIENT
Start: 2020-10-08 | End: 2020-10-15

## 2020-10-08 NOTE — TELEPHONE ENCOUNTER
PT SAYS THAT SHE NEEDS HER ALBUTEROL INHALER SENT TO [facility]. SHE SAID THAT THE SPIRIVA IS NOT WORKING.

## 2020-10-09 NOTE — TELEPHONE ENCOUNTER
PATIENT HAS BEEN CALLED, HER FATHER ANSWERED AND STATED HE WOULD LET HER KNOW TO CHECK THE PHARMACY.

## 2020-10-15 ENCOUNTER — HOSPITAL ENCOUNTER (EMERGENCY)
Facility: HOSPITAL | Age: 55
Discharge: HOME | End: 2020-10-15
Attending: EMERGENCY MEDICINE | Admitting: EMERGENCY MEDICINE

## 2020-10-15 ENCOUNTER — APPOINTMENT (OUTPATIENT)
Dept: GENERAL RADIOLOGY | Facility: HOSPITAL | Age: 55
End: 2020-10-15

## 2020-10-15 VITALS
DIASTOLIC BLOOD PRESSURE: 56 MMHG | BODY MASS INDEX: 17.37 KG/M2 | OXYGEN SATURATION: 93 % | HEART RATE: 82 BPM | WEIGHT: 92 LBS | HEIGHT: 61 IN | RESPIRATION RATE: 14 BRPM | SYSTOLIC BLOOD PRESSURE: 102 MMHG | TEMPERATURE: 98.3 F

## 2020-10-15 DIAGNOSIS — J44.1 COPD EXACERBATION (HCC): Primary | ICD-10-CM

## 2020-10-15 DIAGNOSIS — J20.9 ACUTE BRONCHITIS, UNSPECIFIED ORGANISM: ICD-10-CM

## 2020-10-15 LAB
ALBUMIN SERPL-MCNC: 4.2 G/DL (ref 3.5–5.2)
ALBUMIN/GLOB SERPL: 1.2 G/DL
ALP SERPL-CCNC: 99 U/L (ref 39–117)
ALT SERPL W P-5'-P-CCNC: 11 U/L (ref 1–33)
ANION GAP SERPL CALCULATED.3IONS-SCNC: 10 MMOL/L (ref 5–15)
ARTERIAL PATENCY WRIST A: ABNORMAL
AST SERPL-CCNC: 19 U/L (ref 1–32)
ATMOSPHERIC PRESS: 753 MMHG
BASE EXCESS BLDA CALC-SCNC: 0.9 MMOL/L (ref 0–2)
BASOPHILS # BLD AUTO: 0.04 10*3/MM3 (ref 0–0.2)
BASOPHILS NFR BLD AUTO: 0.3 % (ref 0–1.5)
BDY SITE: ABNORMAL
BILIRUB SERPL-MCNC: 0.3 MG/DL (ref 0–1.2)
BODY TEMPERATURE: 37 C
BUN SERPL-MCNC: 14 MG/DL (ref 6–20)
BUN/CREAT SERPL: 18.7 (ref 7–25)
CALCIUM SPEC-SCNC: 9.4 MG/DL (ref 8.6–10.5)
CHLORIDE SERPL-SCNC: 103 MMOL/L (ref 98–107)
CO2 SERPL-SCNC: 26 MMOL/L (ref 22–29)
CREAT SERPL-MCNC: 0.75 MG/DL (ref 0.57–1)
D DIMER PPP FEU-MCNC: 0.47 MG/L (FEU) (ref 0–0.5)
D-LACTATE SERPL-SCNC: 1 MMOL/L (ref 0.5–2)
DEPRECATED RDW RBC AUTO: 42.4 FL (ref 37–54)
EOSINOPHIL # BLD AUTO: 0.09 10*3/MM3 (ref 0–0.4)
EOSINOPHIL NFR BLD AUTO: 0.7 % (ref 0.3–6.2)
ERYTHROCYTE [DISTWIDTH] IN BLOOD BY AUTOMATED COUNT: 13.2 % (ref 12.3–15.4)
GFR SERPL CREATININE-BSD FRML MDRD: 81 ML/MIN/1.73
GLOBULIN UR ELPH-MCNC: 3.6 GM/DL
GLUCOSE SERPL-MCNC: 76 MG/DL (ref 65–99)
HCO3 BLDA-SCNC: 26.6 MMOL/L (ref 20–26)
HCT VFR BLD AUTO: 36.8 % (ref 34–46.6)
HGB BLD-MCNC: 11.9 G/DL (ref 12–15.9)
IMM GRANULOCYTES # BLD AUTO: 0.03 10*3/MM3 (ref 0–0.05)
IMM GRANULOCYTES NFR BLD AUTO: 0.2 % (ref 0–0.5)
LYMPHOCYTES # BLD AUTO: 2.45 10*3/MM3 (ref 0.7–3.1)
LYMPHOCYTES NFR BLD AUTO: 20.4 % (ref 19.6–45.3)
Lab: ABNORMAL
MCH RBC QN AUTO: 28.6 PG (ref 26.6–33)
MCHC RBC AUTO-ENTMCNC: 32.3 G/DL (ref 31.5–35.7)
MCV RBC AUTO: 88.5 FL (ref 79–97)
MODALITY: ABNORMAL
MONOCYTES # BLD AUTO: 1.03 10*3/MM3 (ref 0.1–0.9)
MONOCYTES NFR BLD AUTO: 8.6 % (ref 5–12)
NEUTROPHILS NFR BLD AUTO: 69.8 % (ref 42.7–76)
NEUTROPHILS NFR BLD AUTO: 8.39 10*3/MM3 (ref 1.7–7)
NRBC BLD AUTO-RTO: 0 /100 WBC (ref 0–0.2)
NT-PROBNP SERPL-MCNC: 113.3 PG/ML (ref 0–900)
PCO2 BLDA: 46.1 MM HG (ref 35–45)
PCO2 TEMP ADJ BLD: 46.1 MM HG (ref 35–45)
PH BLDA: 7.37 PH UNITS (ref 7.35–7.45)
PH, TEMP CORRECTED: 7.37 PH UNITS (ref 7.35–7.45)
PLATELET # BLD AUTO: 299 10*3/MM3 (ref 140–450)
PMV BLD AUTO: 8.5 FL (ref 6–12)
PO2 BLDA: 57.8 MM HG (ref 83–108)
PO2 TEMP ADJ BLD: 57.8 MM HG (ref 83–108)
POTASSIUM SERPL-SCNC: 3.9 MMOL/L (ref 3.5–5.2)
PROT SERPL-MCNC: 7.8 G/DL (ref 6–8.5)
RBC # BLD AUTO: 4.16 10*6/MM3 (ref 3.77–5.28)
SAO2 % BLDCOA: 91.4 % (ref 94–99)
SARS-COV-2 RDRP RESP QL NAA+PROBE: NOT DETECTED
SODIUM SERPL-SCNC: 139 MMOL/L (ref 136–145)
TROPONIN T SERPL-MCNC: <0.01 NG/ML (ref 0–0.03)
VENTILATOR MODE: ABNORMAL
WBC # BLD AUTO: 12.03 10*3/MM3 (ref 3.4–10.8)

## 2020-10-15 PROCEDURE — 85025 COMPLETE CBC W/AUTO DIFF WBC: CPT | Performed by: EMERGENCY MEDICINE

## 2020-10-15 PROCEDURE — 36415 COLL VENOUS BLD VENIPUNCTURE: CPT

## 2020-10-15 PROCEDURE — 96374 THER/PROPH/DIAG INJ IV PUSH: CPT

## 2020-10-15 PROCEDURE — 87635 SARS-COV-2 COVID-19 AMP PRB: CPT | Performed by: EMERGENCY MEDICINE

## 2020-10-15 PROCEDURE — 25010000002 METHYLPREDNISOLONE PER 125 MG: Performed by: EMERGENCY MEDICINE

## 2020-10-15 PROCEDURE — 82803 BLOOD GASES ANY COMBINATION: CPT

## 2020-10-15 PROCEDURE — 84484 ASSAY OF TROPONIN QUANT: CPT | Performed by: EMERGENCY MEDICINE

## 2020-10-15 PROCEDURE — 36600 WITHDRAWAL OF ARTERIAL BLOOD: CPT

## 2020-10-15 PROCEDURE — 99283 EMERGENCY DEPT VISIT LOW MDM: CPT

## 2020-10-15 PROCEDURE — 87040 BLOOD CULTURE FOR BACTERIA: CPT | Performed by: EMERGENCY MEDICINE

## 2020-10-15 PROCEDURE — 71045 X-RAY EXAM CHEST 1 VIEW: CPT

## 2020-10-15 PROCEDURE — 94799 UNLISTED PULMONARY SVC/PX: CPT

## 2020-10-15 PROCEDURE — 83880 ASSAY OF NATRIURETIC PEPTIDE: CPT | Performed by: EMERGENCY MEDICINE

## 2020-10-15 PROCEDURE — 80053 COMPREHEN METABOLIC PANEL: CPT | Performed by: EMERGENCY MEDICINE

## 2020-10-15 PROCEDURE — 94640 AIRWAY INHALATION TREATMENT: CPT

## 2020-10-15 PROCEDURE — 83605 ASSAY OF LACTIC ACID: CPT | Performed by: EMERGENCY MEDICINE

## 2020-10-15 PROCEDURE — 85379 FIBRIN DEGRADATION QUANT: CPT | Performed by: EMERGENCY MEDICINE

## 2020-10-15 RX ORDER — METHYLPREDNISOLONE SODIUM SUCCINATE 125 MG/2ML
125 INJECTION, POWDER, LYOPHILIZED, FOR SOLUTION INTRAMUSCULAR; INTRAVENOUS ONCE
Status: COMPLETED | OUTPATIENT
Start: 2020-10-15 | End: 2020-10-15

## 2020-10-15 RX ORDER — IPRATROPIUM BROMIDE AND ALBUTEROL SULFATE 2.5; .5 MG/3ML; MG/3ML
3 SOLUTION RESPIRATORY (INHALATION) ONCE
Status: COMPLETED | OUTPATIENT
Start: 2020-10-15 | End: 2020-10-15

## 2020-10-15 RX ORDER — METHYLPREDNISOLONE 4 MG/1
TABLET ORAL
Qty: 21 TABLET | Refills: 0 | Status: SHIPPED | OUTPATIENT
Start: 2020-10-15 | End: 2020-12-31

## 2020-10-15 RX ORDER — ALBUTEROL SULFATE 90 UG/1
2 AEROSOL, METERED RESPIRATORY (INHALATION) EVERY 4 HOURS PRN
Qty: 1 G | Refills: 0 | Status: SHIPPED | OUTPATIENT
Start: 2020-10-15 | End: 2020-12-31 | Stop reason: SDUPTHER

## 2020-10-15 RX ORDER — BROMPHENIRAMINE MALEATE, PSEUDOEPHEDRINE HYDROCHLORIDE, AND DEXTROMETHORPHAN HYDROBROMIDE 2; 30; 10 MG/5ML; MG/5ML; MG/5ML
5 SYRUP ORAL 4 TIMES DAILY PRN
Qty: 160 ML | Refills: 0 | Status: SHIPPED | OUTPATIENT
Start: 2020-10-15 | End: 2020-12-31

## 2020-10-15 RX ORDER — AZITHROMYCIN 250 MG/1
TABLET, FILM COATED ORAL
Qty: 6 TABLET | Refills: 0 | Status: SHIPPED | OUTPATIENT
Start: 2020-10-15 | End: 2020-12-31

## 2020-10-15 RX ADMIN — IPRATROPIUM BROMIDE AND ALBUTEROL SULFATE 3 ML: 2.5; .5 SOLUTION RESPIRATORY (INHALATION) at 17:00

## 2020-10-15 RX ADMIN — METHYLPREDNISOLONE SODIUM SUCCINATE 125 MG: 125 INJECTION, POWDER, FOR SOLUTION INTRAMUSCULAR; INTRAVENOUS at 16:59

## 2020-10-15 NOTE — ED PROVIDER NOTES
Subjective   Patient came to the ER complaining of shortness of breath cough congestion flulike symptoms no other complaints will see with this.  There is no fever there is nausea no vomiting.      Flu Symptoms  Presenting symptoms: cough, myalgias and shortness of breath    Presenting symptoms: no diarrhea, no fatigue, no fever, no headaches, no nausea, no rhinorrhea, no sore throat and no vomiting    Severity:  Moderate  Onset quality:  Gradual  Progression:  Worsening  Chronicity:  New  Relieved by:  Nothing  Worsened by:  Nothing  Ineffective treatments:  None tried  Associated symptoms: no chills, no decreased appetite, no decrease in physical activity, no ear pain, no congestion, no neck stiffness and no syncope    Risk factors: not elderly, no diabetes problem, no heart disease, no kidney disease and no sick contacts        Review of Systems   Constitutional: Negative.  Negative for activity change, appetite change, chills, decreased appetite, diaphoresis, fatigue and fever.   HENT: Negative for congestion, drooling, ear pain, facial swelling, hearing loss, rhinorrhea, sinus pressure and sore throat.    Eyes: Negative.  Negative for discharge.   Respiratory: Positive for cough and shortness of breath.    Gastrointestinal: Negative for abdominal distention, abdominal pain, blood in stool, diarrhea, nausea and vomiting.   Endocrine: Negative.  Negative for cold intolerance, heat intolerance, polydipsia, polyphagia and polyuria.   Genitourinary: Negative.  Negative for dysuria, flank pain and urgency.   Musculoskeletal: Positive for myalgias. Negative for arthralgias, back pain and neck stiffness.   Skin: Negative.  Negative for color change, pallor and rash.   Allergic/Immunologic: Negative.    Neurological: Negative.  Negative for dizziness, seizures, speech difficulty, weakness, numbness and headaches.   Hematological: Negative.  Negative for adenopathy.   All other systems reviewed and are  negative.      Past Medical History:   Diagnosis Date   • Anxiety    • COPD (chronic obstructive pulmonary disease) (CMS/HCC)    • Insomnia    • Osteoporosis        Allergies   Allergen Reactions   • Aspirin Hives   • Ibuprofen Unknown - Low Severity   • Iodine Unknown - Low Severity   • Sulfa Antibiotics Unknown - Low Severity       Past Surgical History:   Procedure Laterality Date   • TONSILLECTOMY     • TUBAL ABDOMINAL LIGATION         Family History   Problem Relation Age of Onset   • Diabetes Mother    • Hypertension Mother    • Hyperlipidemia Mother    • Cancer Mother    • Anxiety disorder Father        Social History     Socioeconomic History   • Marital status:      Spouse name: Not on file   • Number of children: Not on file   • Years of education: Not on file   • Highest education level: Not on file   Tobacco Use   • Smoking status: Current Some Day Smoker     Packs/day: 1.00     Years: 30.00     Pack years: 30.00     Types: Cigarettes   • Smokeless tobacco: Never Used   Substance and Sexual Activity   • Alcohol use: Never     Frequency: Never   • Drug use: Never   • Sexual activity: Not Currently           Objective   Physical Exam  Vitals signs and nursing note reviewed. Exam conducted with a chaperone present.   Constitutional:       General: She is not in acute distress.     Appearance: Normal appearance. She is well-developed. She is not toxic-appearing or diaphoretic.   HENT:      Head: Normocephalic and atraumatic.      Right Ear: External ear normal.      Nose: Nose normal.      Mouth/Throat:      Mouth: Mucous membranes are moist.   Eyes:      Conjunctiva/sclera: Conjunctivae normal.      Pupils: Pupils are equal, round, and reactive to light.   Neck:      Musculoskeletal: Normal range of motion and neck supple. No neck rigidity.   Cardiovascular:      Rate and Rhythm: Regular rhythm. Tachycardia present.      Chest Wall: PMI is not displaced.      Pulses: Normal pulses. No decreased  pulses.      Heart sounds: Normal heart sounds. No murmur.   Pulmonary:      Effort: Respiratory distress present. No tachypnea or accessory muscle usage.      Breath sounds: No stridor. Wheezing and rhonchi present. No decreased breath sounds or rales.   Chest:      Chest wall: No tenderness or crepitus.   Abdominal:      General: Bowel sounds are normal. There is no distension.      Palpations: Abdomen is soft.      Tenderness: There is no abdominal tenderness.   Musculoskeletal: Normal range of motion.         General: No swelling or tenderness.      Comments: Lower extremity exam bilaterally is unremarkable.  There is no right or left calf tenderness .  There is no palpable venous cord.  No obvious difference in the size of the legs.  No pitting edema.  The dorsalis pedis and posterior tibial femoral and popliteal pulses are palpable and +2 bilaterally.  Homans sign is negative   Skin:     General: Skin is warm.      Capillary Refill: Capillary refill takes less than 2 seconds.      Coloration: Skin is not jaundiced.      Findings: No erythema or rash.   Neurological:      General: No focal deficit present.      Mental Status: She is alert and oriented to person, place, and time. Mental status is at baseline.      Cranial Nerves: No cranial nerve deficit.      Motor: No weakness.      Coordination: Coordination normal.      Deep Tendon Reflexes: Reflexes are normal and symmetric. Reflexes normal.   Psychiatric:         Mood and Affect: Mood normal.         Procedures           ED Course  ED Course as of Oct 15 1832   Thu Oct 15, 2020   1623 PPE was utilized    [TS]   1731 Score for PE is 0    [TS]   1821 Ms. Shanita came with shortness of breath cough congestion her Covid screen is negative lab work-up essentially is unremarkable she is slightly hypoxic with maintaining oxygen saturation at 94-95% on room air I have discussed this case at length with the patient and she wants to go home hemodynamically she is  stable I will discharge her home with a follow-up with the primary MD and return the ER for any worsening symptoms she wants  something for cough    [TS]   4233 Risks and benefits of treatments given and alternative treatment options discussed with patient/family. I answered all the questions in simple, plain language, and there was voiced understanding and agreement with plan of care. There were no further questions. Differential diagnosis discussed. Patient/family was advised that the practice of medicine is not always an exact science, and sometimes tests, physical exam, or history may not show the underlying conditions with certainty. Additionally, the condition may change or show itself later after initial presentation. There was also expressed understanding and agreement with this limitation of emergency medicine practice. Patient/family was asked to return to ED if any problem or issues or if condition worsens or does not improved. Patient/family agreed to follow up with PCP/specialist as advised, or return to ED if unable to see a provider in a timely fashion for continued symptoms.     [TS]      ED Course User Index  [TS] Jaquan Henning MD                                           MDM  Number of Diagnoses or Management Options  Diagnosis management comments: Differential Diagnosis:  I considered pulmonary etiology, asthma, chronic obstructive pulmonary disease, pneumonia, pulmonary embolism, adult respiratory distress syndrome, pneumothorax, pleural effusion, pulmonary fibrosis, cardiac etiology, congestive heart failure, myocardial infarction, metabolic etiology, diabetic ketoacidosis, uremia, acidosis, sepsis, anemia, drug related etiology, hyperventilation and CNS disease as a possible cause of dyspnea in this patient. This is a partial list of diagnoses considered.            Amount and/or Complexity of Data Reviewed  Clinical lab tests: ordered and reviewed  Tests in the radiology section of CPT®:  ordered  Tests in the medicine section of CPT®: reviewed and ordered    Risk of Complications, Morbidity, and/or Mortality  Presenting problems: moderate  Diagnostic procedures: moderate  Management options: moderate        Final diagnoses:   COPD exacerbation (CMS/[facility])   Acute bronchitis, unspecified organism            Jaquan Henning MD  10/15/20 8507

## 2020-10-20 LAB
BACTERIA SPEC AEROBE CULT: NORMAL
BACTERIA SPEC AEROBE CULT: NORMAL

## 2020-12-07 DIAGNOSIS — F41.1 GENERALIZED ANXIETY DISORDER: ICD-10-CM

## 2020-12-07 DIAGNOSIS — F51.04 PSYCHOPHYSIOLOGICAL INSOMNIA: ICD-10-CM

## 2020-12-07 DIAGNOSIS — J41.0 SIMPLE CHRONIC BRONCHITIS (HCC): ICD-10-CM

## 2020-12-07 DIAGNOSIS — R10.13 DYSPEPSIA: ICD-10-CM

## 2020-12-07 DIAGNOSIS — M81.6 LOCALIZED OSTEOPOROSIS WITHOUT CURRENT PATHOLOGICAL FRACTURE: ICD-10-CM

## 2020-12-07 RX ORDER — ALBUTEROL SULFATE 90 UG/1
2 AEROSOL, METERED RESPIRATORY (INHALATION) EVERY 4 HOURS PRN
Qty: 1 G | Refills: 0 | OUTPATIENT
Start: 2020-12-07

## 2020-12-07 RX ORDER — CLONAZEPAM 1 MG/1
.5-1 TABLET ORAL NIGHTLY PRN
Qty: 30 TABLET | Refills: 1 | OUTPATIENT
Start: 2020-12-07

## 2020-12-07 RX ORDER — ZOLPIDEM TARTRATE 10 MG/1
5-10 TABLET ORAL NIGHTLY
Qty: 30 TABLET | Refills: 1 | OUTPATIENT
Start: 2020-12-07

## 2020-12-07 RX ORDER — OMEPRAZOLE 20 MG/1
20 CAPSULE, DELAYED RELEASE ORAL DAILY
Qty: 14 CAPSULE | Refills: 1 | OUTPATIENT
Start: 2020-12-07

## 2020-12-07 RX ORDER — ZOLPIDEM TARTRATE 10 MG/1
5-10 TABLET ORAL NIGHTLY
Qty: 25 TABLET | Refills: 0 | Status: SHIPPED | OUTPATIENT
Start: 2020-12-07 | End: 2020-12-31 | Stop reason: SDUPTHER

## 2020-12-07 RX ORDER — TIOTROPIUM BROMIDE INHALATION SPRAY 3.12 UG/1
2 SPRAY, METERED RESPIRATORY (INHALATION)
Qty: 4 G | Refills: 5 | OUTPATIENT
Start: 2020-12-07

## 2020-12-07 RX ORDER — ALENDRONATE SODIUM 70 MG/1
70 TABLET ORAL WEEKLY
Qty: 4 TABLET | Refills: 11 | OUTPATIENT
Start: 2020-12-07

## 2020-12-07 RX ORDER — CLONAZEPAM 1 MG/1
.5-1 TABLET ORAL NIGHTLY PRN
Qty: 25 TABLET | Refills: 0 | Status: SHIPPED | OUTPATIENT
Start: 2020-12-07 | End: 2020-12-31 | Stop reason: SDUPTHER

## 2020-12-10 ENCOUNTER — APPOINTMENT (OUTPATIENT)
Dept: GENERAL RADIOLOGY | Age: 55
End: 2020-12-10
Payer: MEDICAID

## 2020-12-10 ENCOUNTER — HOSPITAL ENCOUNTER (EMERGENCY)
Age: 55
Discharge: HOME | End: 2020-12-10
Payer: MEDICAID

## 2020-12-10 VITALS
WEIGHT: 101 LBS | BODY MASS INDEX: 19.07 KG/M2 | TEMPERATURE: 99.2 F | DIASTOLIC BLOOD PRESSURE: 74 MMHG | OXYGEN SATURATION: 97 % | HEART RATE: 97 BPM | RESPIRATION RATE: 21 BRPM | SYSTOLIC BLOOD PRESSURE: 102 MMHG | HEIGHT: 61 IN

## 2020-12-10 PROCEDURE — 96372 THER/PROPH/DIAG INJ SC/IM: CPT

## 2020-12-10 PROCEDURE — 72100 X-RAY EXAM L-S SPINE 2/3 VWS: CPT

## 2020-12-10 PROCEDURE — 6360000002 HC RX W HCPCS: Performed by: NURSE PRACTITIONER

## 2020-12-10 PROCEDURE — 72220 X-RAY EXAM SACRUM TAILBONE: CPT

## 2020-12-10 PROCEDURE — 99999 PR OFFICE/OUTPT VISIT,PROCEDURE ONLY: CPT | Performed by: NURSE PRACTITIONER

## 2020-12-10 PROCEDURE — 99284 EMERGENCY DEPT VISIT MOD MDM: CPT

## 2020-12-10 RX ORDER — MORPHINE SULFATE 4 MG/ML
4 INJECTION, SOLUTION INTRAMUSCULAR; INTRAVENOUS ONCE
Status: COMPLETED | OUTPATIENT
Start: 2020-12-10 | End: 2020-12-10

## 2020-12-10 RX ORDER — METHOCARBAMOL 500 MG/1
500 TABLET, FILM COATED ORAL 3 TIMES DAILY
Qty: 20 TABLET | Refills: 0 | Status: SHIPPED | OUTPATIENT
Start: 2020-12-10 | End: 2020-12-17

## 2020-12-10 RX ORDER — ORPHENADRINE CITRATE 30 MG/ML
60 INJECTION INTRAMUSCULAR; INTRAVENOUS ONCE
Status: COMPLETED | OUTPATIENT
Start: 2020-12-10 | End: 2020-12-10

## 2020-12-10 RX ADMIN — Medication 4 MG: at 16:46

## 2020-12-10 RX ADMIN — ORPHENADRINE CITRATE 60 MG: 60 INJECTION INTRAMUSCULAR; INTRAVENOUS at 16:46

## 2020-12-10 ASSESSMENT — ENCOUNTER SYMPTOMS
BACK PAIN: 1
ABDOMINAL PAIN: 0

## 2020-12-10 ASSESSMENT — PAIN SCALES - GENERAL: PAINLEVEL_OUTOF10: 10

## 2020-12-10 NOTE — ED PROVIDER NOTES
[facility] EMERGENCY DEPT  eMERGENCY dEPARTMENT eNCOUnter      Pt Name: Geeta Wolfe  MRN: [id number]  [place] 1965  Date of evaluation: 12/10/2020  Provider: Brittanie Carroll, [address]       Chief Complaint   Patient presents with    Tailbone Pain         HISTORY OF PRESENT ILLNESS   (Location/Symptom, Timing/Onset,Context/Setting, Quality, Duration, Modifying Factors, Severity)  Note limiting factors. Maribel Martins a 50 y.o. female who presents to the emergency department for evaluation of tailbone pain. Pt tells me that she tripped over family members dog and fell down several steps. She denies head injury as well as neck pain. She tells me that her hips are sore however relates she has had similar past problems with hip pain. She denies chest pain as well as abdominal pain. She denies extremity injury. She denies lower extremity numbness/weakness as well as saddle anesthesia. HPI    Nursing Notes were reviewed. REVIEW OF SYSTEMS    (2-9 systems for level 4, 10 or more for level 5)     Review of Systems   Constitutional: Negative. Gastrointestinal: Negative for abdominal pain. Musculoskeletal: Positive for back pain. Neurological: Negative for weakness and numbness. A complete review of systems was performed and is negative except as noted above in the HPI. PAST MEDICAL HISTORY     Past Medical History:   Diagnosis Date    COPD (chronic obstructive pulmonary disease) ([facility] [address]     Scoliosis          SURGICAL HISTORY     No past surgical history on file. CURRENT MEDICATIONS       Discharge Medication List as of 12/10/2020  4:38 PM      CONTINUE these medications which have NOT CHANGED    Details   zolpidem (AMBIEN) 10 MG tablet Take by mouth nightly as needed for Sleep. Historical Med      clonazePAM (KLONOPIN) 0.5 MG tablet Take 1 mg by mouth 2 times daily as needed.  Historical Med      ondansetron (ZOFRAN-ODT) 4 MG disintegrating tablet Take 1 tablet by mouth 3 times daily as needed for Nausea or Vomiting, Disp-21 tablet, R-0Print             ALLERGIES     Aspirin; Ibuprofen; Iodine; and Sulfa antibiotics    FAMILY HISTORY     No family history on file.        SOCIAL HISTORY       Social History     Socioeconomic History    Marital status:      Spouse name: Not on file    Number of children: Not on file    Years of education: Not on file    Highest education level: Not on file   Occupational History    Not on file   Social Needs    Financial resource strain: Not on file    Food insecurity     Worry: Not on file     Inability: Not on file    Transportation needs     Medical: Not on file     Non-medical: Not on file   Tobacco Use    Smoking status: Current Every Day Smoker     Packs/day: 0.50    Smokeless tobacco: Never Used   Substance and Sexual Activity    Alcohol use: Never     Frequency: Never    Drug use: Never    Sexual activity: Not on file   Lifestyle    Physical activity     Days per week: Not on file     Minutes per session: Not on file    Stress: Not on file   Relationships    Social connections     Talks on phone: Not on file     Gets together: Not on file     Attends Sikh service: Not on file     Active member of club or organization: Not on file     Attends meetings of clubs or organizations: Not on file     Relationship status: Not on file    Intimate partner violence     Fear of current or ex partner: Not on file     Emotionally abused: Not on file     Physically abused: Not on file     Forced sexual activity: Not on file   Other Topics Concern    Not on file   Social History Narrative    Not on file       SCREENINGS    Terri Coma Scale  Eye Opening: Spontaneous  Best Verbal Response: Oriented  Best Motor Response: Obeys commands  Zurich Coma Scale Score: 15        PHYSICAL EXAM    (up to 7 for level 4, 8 or more for level 5)     ED Triage Vitals [12/10/20 1520]   BP Temp Temp Source Pulse Resp SpO2 Height Weight   97/77 99.2 °F (37.3 °C) Oral 97 21 97 % 5' 1\" (1.549 m) 101 lb (45.8 kg)       Physical Exam  Vitals signs reviewed. HENT:      Head: Atraumatic. Right Ear: External ear normal.      Left Ear: External ear normal.   Neck:      Comments: No direct ttp cervical spine  Cardiovascular:      Rate and Rhythm: Normal rate. Pulmonary:      Effort: Pulmonary effort is normal.   Abdominal:      General: Abdomen is flat. Tenderness: There is no abdominal tenderness. Musculoskeletal:      Comments: No direct ttp lumbar spine  ttp over sacral area  No pilonidal cyst  5/5 MS equal bilaterally at hips/knees/ankles  CMS intact bilateral lower extremities   Skin:     General: Skin is warm and dry. Neurological:      Mental Status: She is alert and oriented to person, place, and time. DIAGNOSTIC RESULTS     EKG: All EKG's are interpreted by the Emergency Department Physician who either signs or Co-signs this chart in the absence of acardiologist.        RADIOLOGY:   Non-plain film images such as CT, Ultrasound andMRI are read by the radiologist. Plain radiographic images are visualized and preliminarily interpreted by the emergency physician with the below findings:        Interpretation per the Radiologist below, if available at the time of this note:    XR LUMBAR SPINE (2-3 VIEWS)   Final Result   . Normal 2 view exam of the lumbar spine. Signed by Dr Noemi Hair on 12/10/2020 4:17 PM      XR SACRUM COCCYX (MIN 2 VIEWS)   Final Result   No acute displaced fracture noted. If symptoms persist further evaluation with CT scan of the sacrum or   coccyx may be obtained. Signed by Dr Olympia Lesches on 12/10/2020 4:16 PM            ED BEDSIDE ULTRASOUND:   Performed by ED Physician - none    LABS:  Labs Reviewed - No data to display    All other labs were within normal range or not returned as of this dictation.     RE-ASSESSMENT           EMERGENCY DEPARTMENT COURSE and DIFFERENTIALDIAGNOSIS/MDM:   Vitals:    Vitals: 12/10/20 1520 12/10/20 1654   BP: 97/77 102/74   Pulse: 97    Resp: 21    Temp: 99.2 °F (37.3 °C)    TempSrc: Oral    SpO2: 97%    Weight: 101 lb (45.8 kg)    Height: 5' 1\" (1.549 m)        MDM      CONSULTS:  None    PROCEDURES:  Unless otherwise notedbelow, none     Procedures    FINAL IMPRESSION     1. Contusion of lower back, initial encounter          DISPOSITION/PLAN   DISPOSITION Decision To Discharge 12/10/2020 04:54:57 PM      PATIENT REFERRED TO:  Giuseppe Kumar  [address]    Schedule an appointment as soon as possible for a visit   As needed      DISCHARGE MEDICATIONS:    Attestation: The Prescription Monitoring Report for this patient was reviewed today. ([id number] rx'd suboxone) PERFECTO Oconnor)  Periodic Controlled Substance Monitoring: Possible medication side effects, risk of tolerance/dependence & alternative treatments discussed., No signs of potential drug abuse or diversion identified.  PERFECTO Oconnor)  Discharge Medication List as of 12/10/2020  4:38 PM           Medication List      START taking these medications    methocarbamol 500 MG tablet  Commonly known as:  Robaxin  Take 1 tablet by mouth 3 times daily for 7 days        ASK your doctor about these medications    KlonoPIN 0.5 MG tablet  Generic drug:  clonazePAM     ondansetron 4 MG disintegrating tablet  Commonly known as:  ZOFRAN-ODT  Take 1 tablet by mouth 3 times daily as needed for Nausea or Vomiting     zolpidem 10 MG tablet  Commonly known as:  AMBIEN           Where to Get Your Medications      You can get these medications from any pharmacy    Bring a paper prescription for each of these medications  · methocarbamol 500 MG tablet           (Pleasenote that portions of this note were completed with a voice recognition program.  Efforts were made to edit the dictations but occasionally words are mis-transcribed.)              PERFECTO Oconnor  12/10/20 0078

## 2020-12-31 ENCOUNTER — OFFICE VISIT (OUTPATIENT)
Dept: INTERNAL MEDICINE | Facility: CLINIC | Age: 55
End: 2020-12-31

## 2020-12-31 VITALS
WEIGHT: 92.4 LBS | DIASTOLIC BLOOD PRESSURE: 70 MMHG | OXYGEN SATURATION: 92 % | HEART RATE: 92 BPM | RESPIRATION RATE: 16 BRPM | HEIGHT: 61 IN | SYSTOLIC BLOOD PRESSURE: 110 MMHG | BODY MASS INDEX: 17.44 KG/M2 | TEMPERATURE: 98.1 F

## 2020-12-31 DIAGNOSIS — R13.19 ESOPHAGEAL DYSPHAGIA: Primary | ICD-10-CM

## 2020-12-31 DIAGNOSIS — R10.13 DYSPEPSIA: ICD-10-CM

## 2020-12-31 DIAGNOSIS — F41.1 GENERALIZED ANXIETY DISORDER: ICD-10-CM

## 2020-12-31 DIAGNOSIS — J41.0 SIMPLE CHRONIC BRONCHITIS (HCC): ICD-10-CM

## 2020-12-31 DIAGNOSIS — Z12.31 ENCOUNTER FOR SCREENING MAMMOGRAM FOR MALIGNANT NEOPLASM OF BREAST: ICD-10-CM

## 2020-12-31 DIAGNOSIS — R10.13 EPIGASTRIC PAIN: ICD-10-CM

## 2020-12-31 DIAGNOSIS — F51.04 PSYCHOPHYSIOLOGICAL INSOMNIA: ICD-10-CM

## 2020-12-31 PROCEDURE — 99396 PREV VISIT EST AGE 40-64: CPT | Performed by: INTERNAL MEDICINE

## 2020-12-31 RX ORDER — ALBUTEROL SULFATE 90 UG/1
2 AEROSOL, METERED RESPIRATORY (INHALATION) EVERY 4 HOURS PRN
Qty: 1 G | Refills: 5 | Status: SHIPPED | OUTPATIENT
Start: 2020-12-31 | End: 2021-05-17 | Stop reason: SDUPTHER

## 2020-12-31 RX ORDER — ZOLPIDEM TARTRATE 10 MG/1
5-10 TABLET ORAL NIGHTLY
Qty: 25 TABLET | Refills: 2 | Status: SHIPPED | OUTPATIENT
Start: 2021-01-07 | End: 2021-04-30 | Stop reason: SDUPTHER

## 2020-12-31 RX ORDER — OMEPRAZOLE 20 MG/1
20 CAPSULE, DELAYED RELEASE ORAL DAILY
Qty: 30 CAPSULE | Refills: 5 | Status: SHIPPED | OUTPATIENT
Start: 2020-12-31 | End: 2021-05-17 | Stop reason: SDUPTHER

## 2020-12-31 RX ORDER — CLONAZEPAM 1 MG/1
.5-1 TABLET ORAL NIGHTLY PRN
Qty: 20 TABLET | Refills: 0 | Status: SHIPPED | OUTPATIENT
Start: 2021-01-07 | End: 2021-02-01 | Stop reason: SDUPTHER

## 2020-12-31 NOTE — PROGRESS NOTES
CC: f/u preventive health    History:  Carol Lawler is a 55 y.o. female who presents today for evaluation of the above problems.      She has been having ongoing dysphagia, and epigastric pain, and worsening GERD despite use of PPI.  She was previously referred to GI for evaluation and possible EGD given her dysphagia, but she was not able to make her appointment due to respiratory symptoms.    She felt that her sertraline made her more short of breath, so she stopped taking it.  However, her anxiety has been ongoing.  She has been using her clonazepam in the mornings and does have relief with it.  She has not tried taking 1/2 pill.    She continues on Ambien to assist with insomnia, which has been effective for her.  She has also not tried cutting this in half.      ROS:  Review of Systems   Constitutional: Negative for chills and fever.   HENT: Negative for congestion and sore throat.    Eyes: Negative for visual disturbance.   Respiratory: Negative for cough and shortness of breath.    Cardiovascular: Negative for chest pain and palpitations.   Gastrointestinal: Negative for abdominal pain, constipation and nausea.   Endocrine: Negative for cold intolerance and heat intolerance.   Genitourinary: Negative for difficulty urinating and frequency.   Musculoskeletal: Negative for arthralgias and back pain.   Skin: Negative for rash.   Neurological: Negative for dizziness and headaches.   Psychiatric/Behavioral: Positive for sleep disturbance. Negative for dysphoric mood. The patient is nervous/anxious.        Allergies   Allergen Reactions   • Aspirin Hives   • Ibuprofen Unknown - Low Severity   • Iodine Unknown - Low Severity   • Sulfa Antibiotics Unknown - Low Severity     Past Medical History:   Diagnosis Date   • Anxiety    • COPD (chronic obstructive pulmonary disease) (CMS/[facility])    • Insomnia    • Osteoporosis      Past Surgical History:   Procedure Laterality Date   • TONSILLECTOMY     • TUBAL ABDOMINAL  "LIGATION       Family History   Problem Relation Age of Onset   • Diabetes Mother    • Hypertension Mother    • Hyperlipidemia Mother    • Cancer Mother    • Anxiety disorder Father       reports that she has been smoking cigarettes. She has a 30.00 pack-year smoking history. She has never used smokeless tobacco. She reports that she does not drink alcohol or use drugs.      Current Outpatient Medications:   •  albuterol sulfate  (90 Base) MCG/ACT inhaler, Inhale 2 puffs Every 4 (Four) Hours As Needed for Wheezing., Disp: 1 g, Rfl: 5  •  alendronate (FOSAMAX) 70 MG tablet, Take 1 tablet by mouth 1 (One) Time Per Week., Disp: 4 tablet, Rfl: 11  •  [START ON 1/7/2021] clonazePAM (KlonoPIN) 1 MG tablet, Take 0.5-1 tablets by mouth At Night As Needed for Anxiety for up to 30 days., Disp: 20 tablet, Rfl: 0  •  omeprazole (PrilOSEC) 20 MG capsule, Take 1 capsule by mouth Daily., Disp: 30 capsule, Rfl: 5  •  tiotropium bromide monohydrate (Spiriva Respimat) 2.5 MCG/ACT aerosol solution inhaler, Inhale 2 puffs Daily., Disp: 4 g, Rfl: 5  •  [START ON 1/7/2021] zolpidem (AMBIEN) 10 MG tablet, Take 0.5-1 tablets by mouth Every Night., Disp: 25 tablet, Rfl: 2    OBJECTIVE:  /70 (BP Location: Left arm, Patient Position: Sitting, Cuff Size: Adult)   Pulse 92   Temp 98.1 °F (36.7 °C)   Resp 16   Ht 154.9 cm (61\")   Wt 41.9 kg (92 lb 6.4 oz)   SpO2 92%   Breastfeeding No   BMI 17.46 kg/m²    Physical Exam  Constitutional:       General: She is not in acute distress.     Appearance: She is well-developed.   HENT:      Head: Normocephalic and atraumatic.      Right Ear: External ear normal.      Left Ear: External ear normal.   Eyes:      General: No scleral icterus.     Extraocular Movements: Extraocular movements intact.   Neck:      Musculoskeletal: Normal range of motion and neck supple.      Trachea: No tracheal deviation.   Cardiovascular:      Rate and Rhythm: Normal rate and regular rhythm.      Heart " sounds: Normal heart sounds. No murmur.   Pulmonary:      Effort: Pulmonary effort is normal. No accessory muscle usage or respiratory distress.      Breath sounds: Normal breath sounds. No wheezing.   Abdominal:      General: There is no distension.      Palpations: Abdomen is soft.      Tenderness: There is no abdominal tenderness.   Musculoskeletal: Normal range of motion.      Right lower leg: No edema.      Left lower leg: No edema.   Skin:     General: Skin is warm and dry.      Nails: There is no clubbing.     Neurological:      Mental Status: She is alert and oriented to person, place, and time.      Coordination: Coordination normal.      Gait: Gait normal.   Psychiatric:         Mood and Affect: Mood normal. Mood is not anxious or depressed.         Behavior: Behavior normal.         Assessment/Plan     Diagnoses and all orders for this visit:    1. Esophageal dysphagia (Primary)  2. Epigastric pain  3. Dyspepsia  -     omeprazole (PrilOSEC) 20 MG capsule; Take 1 capsule by mouth Daily.  Dispense: 30 capsule; Refill: 5  -     Ambulatory Referral to Gastroenterology  Worsening symptoms despite PPI.  Given alarm symptom of dysphagia, we will refer back to GI.    4. Generalized anxiety disorder  -     clonazePAM (KlonoPIN) 1 MG tablet; Take 0.5-1 tablets by mouth At Night As Needed for Anxiety for up to 30 days.  Dispense: 20 tablet; Refill: 0  PDMP data is reviewed and we will decrease number of clonazepam to #22 encourage use of 1/2 tablet.  We will expect this to last a full 30 days.    5. Psychophysiological insomnia  -     zolpidem (AMBIEN) 10 MG tablet; Take 0.5-1 tablets by mouth Every Night.  Dispense: 25 tablet; Refill: 2  -     clonazePAM (KlonoPIN) 1 MG tablet; Take 0.5-1 tablets by mouth At Night As Needed for Anxiety for up to 30 days.  Dispense: 20 tablet; Refill: 0  Ambien is refilled after review of PDMP data.  We will also encourage half tablet use of this.    6. Simple chronic bronchitis  (CMS/[facility])  -     albuterol sulfate  (90 Base) MCG/ACT inhaler; Inhale 2 puffs Every 4 (Four) Hours As Needed for Wheezing.  Dispense: 1 g; Refill: 5  -     Full Pulmonary Function Test With Bronchodilator; Future  PFTs to further evaluate her pulmonary function.  It is very possible she may need additional pharmacologic therapy besides her short acting beta agonist.    7. Encounter for screening mammogram for malignant neoplasm of breast  -     Mammo Screening Digital Tomosynthesis Bilateral With CAD; Future         An After Visit Summary was printed and given to the patient at discharge.  Return in about 4 months (around 4/30/2021) for Recheck.         Manuelito Griffin D.O. 12/31/2020   Electronically signed.

## 2021-01-10 ENCOUNTER — HOSPITAL ENCOUNTER (INPATIENT)
Age: 56
LOS: 3 days | Discharge: HOME HEALTH | DRG: 444 | End: 2021-01-13
Attending: EMERGENCY MEDICINE
Payer: MEDICAID

## 2021-01-10 ENCOUNTER — APPOINTMENT (OUTPATIENT)
Dept: GENERAL RADIOLOGY | Age: 56
DRG: 444 | End: 2021-01-10
Payer: MEDICAID

## 2021-01-10 ENCOUNTER — APPOINTMENT (OUTPATIENT)
Dept: CT IMAGING | Age: 56
DRG: 444 | End: 2021-01-10
Payer: MEDICAID

## 2021-01-10 DIAGNOSIS — R10.13 ABDOMINAL PAIN, EPIGASTRIC: Primary | ICD-10-CM

## 2021-01-10 DIAGNOSIS — K83.8 DILATION OF COMMON BILE DUCT: ICD-10-CM

## 2021-01-10 LAB
ALBUMIN SERPL-MCNC: 3.8 G/DL (ref 3.5–5.2)
ALP BLD-CCNC: 94 U/L (ref 35–104)
ALT SERPL-CCNC: 13 U/L (ref 5–33)
ANION GAP SERPL CALCULATED.3IONS-SCNC: 8 MMOL/L (ref 7–19)
APTT: 32 SEC (ref 26–36.2)
AST SERPL-CCNC: 25 U/L (ref 5–32)
BASOPHILS ABSOLUTE: 0.1 K/UL (ref 0–0.2)
BASOPHILS RELATIVE PERCENT: 0.7 % (ref 0–1)
BILIRUB SERPL-MCNC: <0.2 MG/DL (ref 0.2–1.2)
BILIRUBIN URINE: NEGATIVE
BLOOD, URINE: NEGATIVE
BUN BLDV-MCNC: 11 MG/DL (ref 6–20)
CALCIUM SERPL-MCNC: 8.9 MG/DL (ref 8.6–10)
CHLORIDE BLD-SCNC: 103 MMOL/L (ref 98–111)
CLARITY: CLEAR
CO2: 26 MMOL/L (ref 22–29)
COLOR: YELLOW
CREAT SERPL-MCNC: 0.7 MG/DL (ref 0.5–0.9)
D DIMER: 0.52 UG/ML FEU (ref 0–0.48)
EOSINOPHILS ABSOLUTE: 0.1 K/UL (ref 0–0.6)
EOSINOPHILS RELATIVE PERCENT: 1.9 % (ref 0–5)
GFR AFRICAN AMERICAN: >59
GFR NON-AFRICAN AMERICAN: >60
GLUCOSE BLD-MCNC: 107 MG/DL (ref 74–109)
GLUCOSE URINE: NEGATIVE MG/DL
HCT VFR BLD CALC: 37.4 % (ref 37–47)
HEMOGLOBIN: 11.4 G/DL (ref 12–16)
IMMATURE GRANULOCYTES #: 0 K/UL
INR BLD: 0.92 (ref 0.88–1.18)
KETONES, URINE: NEGATIVE MG/DL
LACTIC ACID: 2.9 MMOL/L (ref 0.5–1.9)
LEUKOCYTE ESTERASE, URINE: NEGATIVE
LIPASE: 26 U/L (ref 13–60)
LYMPHOCYTES ABSOLUTE: 2.6 K/UL (ref 1.1–4.5)
LYMPHOCYTES RELATIVE PERCENT: 34.6 % (ref 20–40)
MCH RBC QN AUTO: 28.2 PG (ref 27–31)
MCHC RBC AUTO-ENTMCNC: 30.5 G/DL (ref 33–37)
MCV RBC AUTO: 92.6 FL (ref 81–99)
MONOCYTES ABSOLUTE: 0.7 K/UL (ref 0–0.9)
MONOCYTES RELATIVE PERCENT: 9 % (ref 0–10)
NEUTROPHILS ABSOLUTE: 4.1 K/UL (ref 1.5–7.5)
NEUTROPHILS RELATIVE PERCENT: 53.5 % (ref 50–65)
NITRITE, URINE: NEGATIVE
PDW BLD-RTO: 13.3 % (ref 11.5–14.5)
PH UA: 5 (ref 5–8)
PLATELET # BLD: 273 K/UL (ref 130–400)
PMV BLD AUTO: 8.8 FL (ref 9.4–12.3)
POTASSIUM SERPL-SCNC: 4.3 MMOL/L (ref 3.5–5)
PRO-BNP: 149 PG/ML (ref 0–900)
PROTEIN UA: NEGATIVE MG/DL
PROTHROMBIN TIME: 12.2 SEC (ref 12–14.6)
RBC # BLD: 4.04 M/UL (ref 4.2–5.4)
SARS-COV-2, NAAT: NOT DETECTED
SODIUM BLD-SCNC: 137 MMOL/L (ref 136–145)
SPECIFIC GRAVITY UA: 1.01 (ref 1–1.03)
TOTAL PROTEIN: 6.6 G/DL (ref 6.6–8.7)
TROPONIN: <0.01 NG/ML (ref 0–0.03)
UROBILINOGEN, URINE: 0.2 E.U./DL
WBC # BLD: 7.6 K/UL (ref 4.8–10.8)

## 2021-01-10 PROCEDURE — 6360000002 HC RX W HCPCS: Performed by: EMERGENCY MEDICINE

## 2021-01-10 PROCEDURE — 74176 CT ABD & PELVIS W/O CONTRAST: CPT

## 2021-01-10 PROCEDURE — 83690 ASSAY OF LIPASE: CPT

## 2021-01-10 PROCEDURE — 85379 FIBRIN DEGRADATION QUANT: CPT

## 2021-01-10 PROCEDURE — 85025 COMPLETE CBC W/AUTO DIFF WBC: CPT

## 2021-01-10 PROCEDURE — 36415 COLL VENOUS BLD VENIPUNCTURE: CPT

## 2021-01-10 PROCEDURE — 93005 ELECTROCARDIOGRAM TRACING: CPT | Performed by: EMERGENCY MEDICINE

## 2021-01-10 PROCEDURE — 85730 THROMBOPLASTIN TIME PARTIAL: CPT

## 2021-01-10 PROCEDURE — 96375 TX/PRO/DX INJ NEW DRUG ADDON: CPT

## 2021-01-10 PROCEDURE — 2580000003 HC RX 258: Performed by: PHYSICIAN ASSISTANT

## 2021-01-10 PROCEDURE — 96374 THER/PROPH/DIAG INJ IV PUSH: CPT

## 2021-01-10 PROCEDURE — 84484 ASSAY OF TROPONIN QUANT: CPT

## 2021-01-10 PROCEDURE — 6370000000 HC RX 637 (ALT 250 FOR IP): Performed by: INTERNAL MEDICINE

## 2021-01-10 PROCEDURE — 2580000003 HC RX 258: Performed by: EMERGENCY MEDICINE

## 2021-01-10 PROCEDURE — 71045 X-RAY EXAM CHEST 1 VIEW: CPT

## 2021-01-10 PROCEDURE — 80053 COMPREHEN METABOLIC PANEL: CPT

## 2021-01-10 PROCEDURE — 83880 ASSAY OF NATRIURETIC PEPTIDE: CPT

## 2021-01-10 PROCEDURE — 1210000000 HC MED SURG R&B

## 2021-01-10 PROCEDURE — 99283 EMERGENCY DEPT VISIT LOW MDM: CPT

## 2021-01-10 PROCEDURE — 81003 URINALYSIS AUTO W/O SCOPE: CPT

## 2021-01-10 PROCEDURE — 85610 PROTHROMBIN TIME: CPT

## 2021-01-10 PROCEDURE — U0002 COVID-19 LAB TEST NON-CDC: HCPCS

## 2021-01-10 PROCEDURE — 6360000002 HC RX W HCPCS: Performed by: PHYSICIAN ASSISTANT

## 2021-01-10 PROCEDURE — 83605 ASSAY OF LACTIC ACID: CPT

## 2021-01-10 RX ORDER — POLYETHYLENE GLYCOL 3350 17 G/17G
17 POWDER, FOR SOLUTION ORAL DAILY PRN
Status: DISCONTINUED | OUTPATIENT
Start: 2021-01-10 | End: 2021-01-13 | Stop reason: HOSPADM

## 2021-01-10 RX ORDER — IPRATROPIUM BROMIDE AND ALBUTEROL SULFATE 2.5; .5 MG/3ML; MG/3ML
1 SOLUTION RESPIRATORY (INHALATION)
Status: DISCONTINUED | OUTPATIENT
Start: 2021-01-10 | End: 2021-01-13 | Stop reason: HOSPADM

## 2021-01-10 RX ORDER — ONDANSETRON 2 MG/ML
4 INJECTION INTRAMUSCULAR; INTRAVENOUS EVERY 6 HOURS PRN
Status: DISCONTINUED | OUTPATIENT
Start: 2021-01-10 | End: 2021-01-13 | Stop reason: HOSPADM

## 2021-01-10 RX ORDER — SODIUM CHLORIDE 0.9 % (FLUSH) 0.9 %
10 SYRINGE (ML) INJECTION PRN
Status: DISCONTINUED | OUTPATIENT
Start: 2021-01-10 | End: 2021-01-13 | Stop reason: HOSPADM

## 2021-01-10 RX ORDER — PROMETHAZINE HYDROCHLORIDE 12.5 MG/1
12.5 TABLET ORAL EVERY 6 HOURS PRN
Status: DISCONTINUED | OUTPATIENT
Start: 2021-01-10 | End: 2021-01-13 | Stop reason: HOSPADM

## 2021-01-10 RX ORDER — ZOLPIDEM TARTRATE 10 MG/1
5-10 TABLET ORAL NIGHTLY
COMMUNITY
Start: 2021-01-07

## 2021-01-10 RX ORDER — ONDANSETRON 2 MG/ML
4 INJECTION INTRAMUSCULAR; INTRAVENOUS ONCE
Status: COMPLETED | OUTPATIENT
Start: 2021-01-10 | End: 2021-01-10

## 2021-01-10 RX ORDER — POTASSIUM CHLORIDE 7.45 MG/ML
10 INJECTION INTRAVENOUS PRN
Status: DISCONTINUED | OUTPATIENT
Start: 2021-01-10 | End: 2021-01-13 | Stop reason: HOSPADM

## 2021-01-10 RX ORDER — MORPHINE SULFATE 4 MG/ML
4 INJECTION, SOLUTION INTRAMUSCULAR; INTRAVENOUS ONCE
Status: COMPLETED | OUTPATIENT
Start: 2021-01-10 | End: 2021-01-10

## 2021-01-10 RX ORDER — LORAZEPAM 2 MG/ML
0.5 INJECTION INTRAMUSCULAR ONCE
Status: COMPLETED | OUTPATIENT
Start: 2021-01-10 | End: 2021-01-10

## 2021-01-10 RX ORDER — ACETAMINOPHEN 650 MG/1
650 SUPPOSITORY RECTAL EVERY 6 HOURS PRN
Status: DISCONTINUED | OUTPATIENT
Start: 2021-01-10 | End: 2021-01-13 | Stop reason: HOSPADM

## 2021-01-10 RX ORDER — HYDROMORPHONE HYDROCHLORIDE 1 MG/ML
0.5 INJECTION, SOLUTION INTRAMUSCULAR; INTRAVENOUS; SUBCUTANEOUS EVERY 6 HOURS PRN
Status: DISCONTINUED | OUTPATIENT
Start: 2021-01-10 | End: 2021-01-13 | Stop reason: HOSPADM

## 2021-01-10 RX ORDER — POTASSIUM CHLORIDE 20 MEQ/1
40 TABLET, EXTENDED RELEASE ORAL PRN
Status: DISCONTINUED | OUTPATIENT
Start: 2021-01-10 | End: 2021-01-13 | Stop reason: HOSPADM

## 2021-01-10 RX ORDER — MAGNESIUM SULFATE IN WATER 40 MG/ML
2 INJECTION, SOLUTION INTRAVENOUS PRN
Status: DISCONTINUED | OUTPATIENT
Start: 2021-01-10 | End: 2021-01-13 | Stop reason: HOSPADM

## 2021-01-10 RX ORDER — 0.9 % SODIUM CHLORIDE 0.9 %
1000 INTRAVENOUS SOLUTION INTRAVENOUS ONCE
Status: COMPLETED | OUTPATIENT
Start: 2021-01-10 | End: 2021-01-10

## 2021-01-10 RX ORDER — SODIUM CHLORIDE 0.9 % (FLUSH) 0.9 %
10 SYRINGE (ML) INJECTION EVERY 12 HOURS SCHEDULED
Status: DISCONTINUED | OUTPATIENT
Start: 2021-01-10 | End: 2021-01-13 | Stop reason: HOSPADM

## 2021-01-10 RX ORDER — HYDROMORPHONE HYDROCHLORIDE 1 MG/ML
0.5 INJECTION, SOLUTION INTRAMUSCULAR; INTRAVENOUS; SUBCUTANEOUS ONCE
Status: COMPLETED | OUTPATIENT
Start: 2021-01-10 | End: 2021-01-10

## 2021-01-10 RX ORDER — SODIUM CHLORIDE 9 MG/ML
INJECTION, SOLUTION INTRAVENOUS CONTINUOUS
Status: DISCONTINUED | OUTPATIENT
Start: 2021-01-10 | End: 2021-01-13 | Stop reason: HOSPADM

## 2021-01-10 RX ORDER — ACETAMINOPHEN 325 MG/1
650 TABLET ORAL EVERY 6 HOURS PRN
Status: DISCONTINUED | OUTPATIENT
Start: 2021-01-10 | End: 2021-01-13 | Stop reason: HOSPADM

## 2021-01-10 RX ORDER — ZOLPIDEM TARTRATE 5 MG/1
10 TABLET ORAL NIGHTLY
Status: DISCONTINUED | OUTPATIENT
Start: 2021-01-10 | End: 2021-01-13 | Stop reason: HOSPADM

## 2021-01-10 RX ADMIN — ONDANSETRON HYDROCHLORIDE 4 MG: 2 SOLUTION INTRAMUSCULAR; INTRAVENOUS at 15:35

## 2021-01-10 RX ADMIN — SODIUM CHLORIDE: 9 INJECTION, SOLUTION INTRAVENOUS at 19:54

## 2021-01-10 RX ADMIN — LORAZEPAM 0.5 MG: 2 INJECTION INTRAMUSCULAR; INTRAVENOUS at 17:44

## 2021-01-10 RX ADMIN — MORPHINE SULFATE 4 MG: 4 INJECTION, SOLUTION INTRAMUSCULAR; INTRAVENOUS at 15:35

## 2021-01-10 RX ADMIN — SODIUM CHLORIDE 1000 ML: 9 INJECTION, SOLUTION INTRAVENOUS at 15:35

## 2021-01-10 RX ADMIN — HYDROMORPHONE HYDROCHLORIDE 0.5 MG: 1 INJECTION, SOLUTION INTRAMUSCULAR; INTRAVENOUS; SUBCUTANEOUS at 21:40

## 2021-01-10 RX ADMIN — HYDROMORPHONE HYDROCHLORIDE 0.5 MG: 1 INJECTION, SOLUTION INTRAMUSCULAR; INTRAVENOUS; SUBCUTANEOUS at 19:18

## 2021-01-10 RX ADMIN — ZOLPIDEM TARTRATE 10 MG: 5 TABLET ORAL at 21:23

## 2021-01-10 RX ADMIN — SODIUM CHLORIDE, PRESERVATIVE FREE 10 ML: 5 INJECTION INTRAVENOUS at 19:55

## 2021-01-10 ASSESSMENT — ENCOUNTER SYMPTOMS
WHEEZING: 0
COUGH: 0
TROUBLE SWALLOWING: 0
SHORTNESS OF BREATH: 1
CONSTIPATION: 0
NAUSEA: 1
RHINORRHEA: 0
DIARRHEA: 0
SORE THROAT: 0
VOMITING: 1
CHOKING: 0
ABDOMINAL DISTENTION: 1
SINUS PAIN: 0
PHOTOPHOBIA: 0
STRIDOR: 0
BACK PAIN: 0
ABDOMINAL PAIN: 1

## 2021-01-10 ASSESSMENT — PAIN DESCRIPTION - ORIENTATION: ORIENTATION: RIGHT

## 2021-01-10 ASSESSMENT — PAIN DESCRIPTION - PROGRESSION: CLINICAL_PROGRESSION: NOT CHANGED

## 2021-01-10 ASSESSMENT — PAIN SCALES - GENERAL
PAINLEVEL_OUTOF10: 8
PAINLEVEL_OUTOF10: 4

## 2021-01-10 ASSESSMENT — PAIN DESCRIPTION - DIRECTION: RADIATING_TOWARDS: NO

## 2021-01-10 ASSESSMENT — PAIN DESCRIPTION - DESCRIPTORS: DESCRIPTORS: SHARP

## 2021-01-10 ASSESSMENT — PAIN DESCRIPTION - PAIN TYPE: TYPE: ACUTE PAIN

## 2021-01-10 NOTE — ED PROVIDER NOTES
[facility]  eMERGENCY dEPARTMENT eNCOUnter      Pt Name: Sonya Kulkarni  MRN: [id number]  [place] 1965  Date of evaluation: 1/10/2021  Provider: Rohit Hood MD    CHIEF COMPLAINT       Chief Complaint   Patient presents with    Abdominal Pain     states abdominal pain and \"swelling\" states \"feels like someone is squeezing me\"         HISTORY OF PRESENT ILLNESS   (Location/Symptom, Timing/Onset,Context/Setting, Quality, Duration, Modifying Factors, Severity)  Note limiting factors. Sonya Kulkarni is a 50 y.o. female who presents to the emergency department with shortness of breath and abdominal pain. Patient states that the abdominal pain has been going on for the last year but has worsened over the last few days. She said she was supposed to have a colonoscopy a couple years ago due to having a knot in her abdomen however she kept putting it off because she was afraid as a friend  due to complication from a colonoscopy. She states that the knot has gotten much larger. She says she is unable to burp or pass gas. But she does have bowel movements 2-3 times a day. She has been vomiting due to severe pain. She says the pain is all the time. She also reports shortness of breath and pressure sensation. No chest pain. Shortness of breath has been going on for some time however it worsened due to her abdominal distention. HPI    NursingNotes were reviewed. REVIEW OF SYSTEMS    (2-9 systems for level 4, 10 or more for level 5)     Review of Systems   Constitutional: Negative for chills and fever. HENT: Negative for rhinorrhea and sore throat. Respiratory: Positive for shortness of breath. Cardiovascular: Negative for chest pain and leg swelling. Gastrointestinal: Positive for abdominal distention, abdominal pain, nausea and vomiting. Negative for diarrhea. Genitourinary: Negative for difficulty urinating. Musculoskeletal: Negative for back pain and neck pain.    Skin: Negative for rash. Neurological: Negative for weakness and headaches. Psychiatric/Behavioral: Negative for confusion. A complete review of systems was performed and is negative except as noted above in the HPI. PAST MEDICAL HISTORY     Past Medical History:   Diagnosis Date    COPD (chronic obstructive pulmonary disease) ([facility] [address]     Scoliosis          SURGICAL HISTORY     History reviewed. No pertinent surgical history. CURRENT MEDICATIONS       Previous Medications    CLONAZEPAM (KLONOPIN) 0.5 MG TABLET    Take 1 mg by mouth 2 times daily as needed. ONDANSETRON (ZOFRAN-ODT) 4 MG DISINTEGRATING TABLET    Take 1 tablet by mouth 3 times daily as needed for Nausea or Vomiting    ZOLPIDEM (AMBIEN) 10 MG TABLET    Take 5-10 mg by mouth nightly. ALLERGIES     Aspirin, Ibuprofen, Iodine, and Sulfa antibiotics    FAMILY HISTORY     History reviewed. No pertinent family history.        SOCIAL HISTORY       Social History     Socioeconomic History    Marital status:      Spouse name: None    Number of children: None    Years of education: None    Highest education level: None   Occupational History    None   Social Needs    Financial resource strain: None    Food insecurity     Worry: None     Inability: None    Transportation needs     Medical: None     Non-medical: None   Tobacco Use    Smoking status: Current Every Day Smoker     Packs/day: 0.50     Types: Cigarettes    Smokeless tobacco: Never Used   Substance and Sexual Activity    Alcohol use: Never     Frequency: Never    Drug use: Never    Sexual activity: None   Lifestyle    Physical activity     Days per week: None     Minutes per session: None    Stress: None   Relationships    Social connections     Talks on phone: None     Gets together: None     Attends Orthodoxy service: None     Active member of club or organization: None     Attends meetings of clubs or organizations: None     Relationship status: None    Intimate partner violence     Fear of current or ex partner: None     Emotionally abused: None     Physically abused: None     Forced sexual activity: None   Other Topics Concern    None   Social History Narrative    None       SCREENINGS             PHYSICAL EXAM    (up to 7 for level 4, 8 or more for level 5)     ED Triage Vitals   BP Temp Temp Source Pulse Resp SpO2 Height Weight   01/10/21 1412 01/10/21 1413 01/10/21 1413 01/10/21 1412 01/10/21 1412 01/10/21 1412 01/10/21 1407 01/10/21 1407   (!) 103/48 98.7 °F (37.1 °C) Oral 87 18 98 % 5' 1\" (1.549 m) 96 lb (43.5 kg)       Physical Exam  Vitals signs and nursing note reviewed. Constitutional:       General: She is not in acute distress. Appearance: She is well-developed. She is not diaphoretic. HENT:      Head: Normocephalic and atraumatic. Eyes:      Pupils: Pupils are equal, round, and reactive to light. Neck:      Musculoskeletal: Normal range of motion and neck supple. Cardiovascular:      Rate and Rhythm: Normal rate and regular rhythm. Heart sounds: Normal heart sounds. Pulmonary:      Effort: Pulmonary effort is normal. No respiratory distress. Breath sounds: Normal breath sounds. Abdominal:      General: Bowel sounds are normal. There is no distension. Palpations: Abdomen is soft. Tenderness: There is abdominal tenderness in the right upper quadrant and epigastric area. Musculoskeletal: Normal range of motion. Skin:     General: Skin is warm and dry. Findings: No rash. Neurological:      Mental Status: She is alert and oriented to person, place, and time. Cranial Nerves: No cranial nerve deficit. Motor: No abnormal muscle tone.       Coordination: Coordination normal.   Psychiatric:         Behavior: Behavior normal.         DIAGNOSTIC RESULTS     EKG: All EKG's are interpreted by the Emergency Department Physician who either signs or Co-signs this chart in the absence of a cardiologist.    NSR rate 71 anterior q nonspecific st    RADIOLOGY:   Non-plain film images such as CT, Ultrasound and MRI are read by the radiologist. Wendy Wutadeo images are visualized and preliminarily interpreted by the emergency physician with the below findings:        Interpretation per the Radiologist below, if available at the time of this note:    CT ABDOMEN PELVIS WO CONTRAST Additional Contrast? None   Final Result   1. The common duct is dilated to 9 mm. Stricture or obstruction at the   level ampulla Vater cannot be excluded. 2. Moderate amount of stool is noted in the colon. .    Signed by Dr Sam Posada on 1/10/2021 4:43 PM      XR CHEST PORTABLE   Final Result   1. No radiographic evidence of acute cardiopulmonary process. Signed by Dr Sam Posada on 1/10/2021 3:18 PM            ED BEDSIDE ULTRASOUND:   Performed by ED Physician - none    LABS:  Labs Reviewed   CBC WITH AUTO DIFFERENTIAL - Abnormal; Notable for the following components:       Result Value    RBC 4.04 (*)     Hemoglobin 11.4 (*)     MCHC 30.5 (*)     MPV 8.8 (*)     All other components within normal limits   LACTIC ACID, PLASMA - Abnormal; Notable for the following components:    Lactic Acid 2.9 (*)     All other components within normal limits    Narrative:     CALL  [facility]  [facility] tel. ,  Chemistry results called to and read back by Christo Randall in ED, 01/10/2021 15:29,  by [facility]   D-DIMER, QUANTITATIVE - Abnormal; Notable for the following components:    D-Dimer, Quant 0.52 (*)     All other components within normal limits   APTT   COMPREHENSIVE METABOLIC PANEL   LIPASE   PROTIME-INR   TROPONIN   URINE RT REFLEX TO CULTURE   BRAIN NATRIURETIC PEPTIDE       All other labs were within normal range or not returned as of this dictation.     EMERGENCY DEPARTMENT COURSE and DIFFERENTIALDIAGNOSIS/MDM:   Vitals:    Vitals:    01/10/21 1407 01/10/21 1412 01/10/21 1413   BP:  (!) 103/48    Pulse:  87    Resp:  18    Temp:   98.7 °F (37.1 °C)   TempSrc: Oral   SpO2:  98%    Weight: 96 lb (43.5 kg)     Height: 5' 1\" (1.549 m)         [facility]  D/w Dr Darryle Rafter. Will do MRCP in AM, Dr Cj Mendez available if ERCP needed. ddimer is minimally elevated. She has normal sat, not tachy, and other reasons for her epigastric/lower cp. Has anaphylaxis to IV dye. D/w hospitalist for admission    CONSULTS:  IP CONSULT TO GI    PROCEDURES:  Unless otherwise notedbelow, none     Procedures    FINAL IMPRESSION     1. Abdominal pain, epigastric    2.  Dilation of common bile duct          DISPOSITION/PLAN   DISPOSITION Admitted 01/10/2021 05:29:03 PM      PATIENT REFERRED TO:  @FUP@    DISCHARGE MEDICATIONS:  New Prescriptions    No medications on file          (Please note that portions of this note were completed with a voice recognition program.  Efforts were made to edit the dictations butoccasionally words are mis-transcribed.)    Efrem Schultz MD (electronically signed)  AttendingEmergency Physician         Efrem Shcultz MD  01/10/21 4693

## 2021-01-10 NOTE — H&P
[facility] - History & Physical      PCP: Alvin Saleem    Date of Admission: 1/10/2021    Date of Service: 1/10/2021    Chief Complaint:  Abdominal Pain    History Of Present Illness: The patient is a 50 y.o. female with PMH of COPD who presented to [address] [place] [address] ED complaining of abdominal pain. Pt is sitting up in bed in no acute distress, although anxious. Her abdomen is mildly distended and is tender. She states she has been having abdominal pain for a while but over the last 4 days it has become more severe and she has been throwing up. She also admits to some shortness of breath. She has hx of COPD and admits to smoking about 10 cigarettes a day. Pt denies chest pain, diarrhea, or any other complaints. Work up in ED reveals VSS. Lactic acid at 2.2. D-dimer mild elevation but with SpO2 at 98% there is low suspicion for PE. GI panel normal. CT abdomen reveals dilation of common bile duct at 9mm. GI consulted from ED and planning MRCP in AM.       Past Medical History:        Diagnosis Date    COPD (chronic obstructive pulmonary disease) ([facility] [address]     Scoliosis        Past Surgical History:    History reviewed. No pertinent surgical history. Home Medications:  Prior to Admission medications    Medication Sig Start Date End Date Taking? Authorizing Provider   zolpidem (AMBIEN) 10 MG tablet Take 5-10 mg by mouth nightly. 1/7/21  Yes Historical Provider, MD   clonazePAM (KLONOPIN) 0.5 MG tablet Take 1 mg by mouth 2 times daily as needed. Yes Historical Provider, MD   ondansetron (ZOFRAN-ODT) 4 MG disintegrating tablet Take 1 tablet by mouth 3 times daily as needed for Nausea or Vomiting 12/27/19   [facility] [facility] - [facility]       Allergies:    Aspirin, Ibuprofen, Iodine, and Sulfa antibiotics    Social History:    Tobacco:   reports that she has been smoking cigarettes. She has been smoking about 0.50 packs per day.  She has never used smokeless tobacco.  Alcohol:   reports no history of alcohol use. Family History:  History reviewed. No pertinent family history. Review of Systems:   Review of Systems   Constitutional: Negative for chills, diaphoresis, fatigue and fever. HENT: Negative for congestion, ear pain, sinus pain, sore throat and trouble swallowing. Eyes: Negative for photophobia and visual disturbance. Respiratory: Positive for shortness of breath. Negative for cough, choking, wheezing and stridor. Cardiovascular: Negative for chest pain, palpitations and leg swelling. Gastrointestinal: Positive for abdominal distention, abdominal pain, nausea and vomiting. Negative for constipation and diarrhea. Endocrine: Negative for cold intolerance and heat intolerance. Genitourinary: Negative for difficulty urinating, flank pain, frequency and urgency. Musculoskeletal: Negative for arthralgias and myalgias. Neurological: Negative for dizziness, syncope, weakness, light-headedness, numbness and headaches. Hematological: Does not bruise/bleed easily. Psychiatric/Behavioral: Negative for agitation, confusion and dysphoric mood. The patient is nervous/anxious. Physical Examination:  VITALS: BP (!) 103/48   Pulse 87   Temp 98.7 °F (37.1 °C) (Oral)   Resp 18   Ht 5' 1\" (1.549 m)   Wt 96 lb (43.5 kg)   SpO2 98%   BMI 18.14 kg/m²   Physical Exam  Constitutional:       General: She is not in acute distress. Appearance: She is ill-appearing. She is not toxic-appearing or diaphoretic. Comments: Thin appearing    HENT:      Head: Normocephalic and atraumatic. Right Ear: External ear normal.      Left Ear: External ear normal.      Nose: Nose normal. No congestion or rhinorrhea. Mouth/Throat:      Mouth: Mucous membranes are moist.      Pharynx: Oropharynx is clear. Eyes:      General: No scleral icterus. Extraocular Movements: Extraocular movements intact.       Conjunctiva/sclera: Conjunctivae normal.   Neck:      Musculoskeletal: Normal range of motion and neck supple. Cardiovascular:      Rate and Rhythm: Normal rate and regular rhythm. Pulses: Normal pulses. Heart sounds: Normal heart sounds. No murmur. No friction rub. No gallop. Pulmonary:      Effort: Pulmonary effort is normal. No respiratory distress. Breath sounds: Normal breath sounds. No stridor. No wheezing, rhonchi or rales. Abdominal:      General: Bowel sounds are normal. There is distension. Palpations: Abdomen is soft. Tenderness: There is abdominal tenderness. Musculoskeletal: Normal range of motion. General: No swelling. Right lower leg: No edema. Left lower leg: No edema. Skin:     General: Skin is warm and dry. Coloration: Skin is not jaundiced. Findings: No erythema, lesion or rash. Neurological:      General: No focal deficit present. Mental Status: She is alert and oriented to person, place, and time. Mental status is at baseline. Cranial Nerves: No cranial nerve deficit. Sensory: No sensory deficit. Motor: No weakness. Psychiatric:         Mood and Affect: Mood normal.         Behavior: Behavior normal.         Thought Content:  Thought content normal.         Judgment: Judgment normal.          Diagnostic Data:  CBC:  Recent Labs     01/10/21  1436   WBC 7.6   HGB 11.4*   HCT 37.4        BMP:  Recent Labs     01/10/21  1436      K 4.3      CO2 26   BUN 11   CREATININE 0.7   CALCIUM 8.9     Recent Labs     01/10/21  1436   AST 25   ALT 13   BILITOT <0.2   ALKPHOS 94     Coag Panel:   Recent Labs     01/10/21  1436   INR 0.92   PROTIME 12.2   APTT 32.0     Cardiac Enzymes:   Recent Labs     01/10/21  1436   TROPONINI <0.01     Urinalysis:  Lab Results   Component Value Date    NITRU Negative 01/10/2021    WBCUA 21-30 09/12/2020    BACTERIA 1+ 09/12/2020    RBCUA 2-4 09/12/2020    BLOODU Negative 01/10/2021    SPECGRAV 1.014 01/10/2021    GLUCOSEU Negative 01/10/2021       Ct Abdomen Pelvis Wo Contrast Additional Contrast? None  Result Date: 1/10/2021  1. The common duct is dilated to 9 mm. Stricture or obstruction at the level ampulla Vater cannot be excluded. 2. Moderate amount of stool is noted in the colon. Signed by Dr Dejuan Dominguez on 1/10/2021 4:43 PM    Xr Chest Portable  Result Date: 1/10/2021  1. No radiographic evidence of acute cardiopulmonary process. Signed by Dr Dejuan Dominguez on 1/10/2021 3:18 PM      Assessment/Plan:  Principal Problem:    Dilation of common bile duct- CT shows 9mm dilation. GI consulted from ED and planning MRCP in AM. Pain control and NPO after midnight. Active Problems:    COPD (chronic obstructive pulmonary disease) ([address]- DuoNebs and Oxygen therpay as needed. Further orders per clinical course/attending.      Signed:  Marina Brunner PA-C  1/10/2021, 6:22 PM

## 2021-01-11 PROBLEM — F41.1 GENERALIZED ANXIETY DISORDER: Status: ACTIVE | Noted: 2021-01-11

## 2021-01-11 PROBLEM — E43 SEVERE MALNUTRITION (HCC): Status: ACTIVE | Noted: 2021-01-11

## 2021-01-11 LAB
ALBUMIN SERPL-MCNC: 3.5 G/DL (ref 3.5–5.2)
ALP BLD-CCNC: 85 U/L (ref 35–104)
ALT SERPL-CCNC: 10 U/L (ref 5–33)
ANION GAP SERPL CALCULATED.3IONS-SCNC: 7 MMOL/L (ref 7–19)
AST SERPL-CCNC: 22 U/L (ref 5–32)
BILIRUB SERPL-MCNC: <0.2 MG/DL (ref 0.2–1.2)
BUN BLDV-MCNC: 8 MG/DL (ref 6–20)
CALCIUM SERPL-MCNC: 8.3 MG/DL (ref 8.6–10)
CHLORIDE BLD-SCNC: 107 MMOL/L (ref 98–111)
CO2: 26 MMOL/L (ref 22–29)
CREAT SERPL-MCNC: 0.6 MG/DL (ref 0.5–0.9)
EKG P AXIS: 85 DEGREES
EKG P-R INTERVAL: 178 MS
EKG Q-T INTERVAL: 384 MS
EKG QRS DURATION: 70 MS
EKG QTC CALCULATION (BAZETT): 403 MS
EKG T AXIS: 61 DEGREES
GFR AFRICAN AMERICAN: >59
GFR NON-AFRICAN AMERICAN: >60
GLUCOSE BLD-MCNC: 83 MG/DL (ref 74–109)
HCT VFR BLD CALC: 39.3 % (ref 37–47)
HEMOGLOBIN: 12.2 G/DL (ref 12–16)
LACTIC ACID: 0.8 MMOL/L (ref 0.5–1.9)
MCH RBC QN AUTO: 27.9 PG (ref 27–31)
MCHC RBC AUTO-ENTMCNC: 31 G/DL (ref 33–37)
MCV RBC AUTO: 89.9 FL (ref 81–99)
PDW BLD-RTO: 13.2 % (ref 11.5–14.5)
PHOSPHORUS: 3.3 MG/DL (ref 2.5–4.5)
PLATELET # BLD: 244 K/UL (ref 130–400)
PMV BLD AUTO: 8.4 FL (ref 9.4–12.3)
POTASSIUM REFLEX MAGNESIUM: 4.9 MMOL/L (ref 3.5–5)
RBC # BLD: 4.37 M/UL (ref 4.2–5.4)
SODIUM BLD-SCNC: 140 MMOL/L (ref 136–145)
TOTAL PROTEIN: 5.4 G/DL (ref 6.6–8.7)
WBC # BLD: 5.9 K/UL (ref 4.8–10.8)

## 2021-01-11 PROCEDURE — 6360000002 HC RX W HCPCS: Performed by: PHYSICIAN ASSISTANT

## 2021-01-11 PROCEDURE — 84100 ASSAY OF PHOSPHORUS: CPT

## 2021-01-11 PROCEDURE — 36415 COLL VENOUS BLD VENIPUNCTURE: CPT

## 2021-01-11 PROCEDURE — 80053 COMPREHEN METABOLIC PANEL: CPT

## 2021-01-11 PROCEDURE — 6370000000 HC RX 637 (ALT 250 FOR IP): Performed by: INTERNAL MEDICINE

## 2021-01-11 PROCEDURE — 2580000003 HC RX 258: Performed by: PHYSICIAN ASSISTANT

## 2021-01-11 PROCEDURE — 6370000000 HC RX 637 (ALT 250 FOR IP): Performed by: PHYSICIAN ASSISTANT

## 2021-01-11 PROCEDURE — 85027 COMPLETE CBC AUTOMATED: CPT

## 2021-01-11 PROCEDURE — 6360000002 HC RX W HCPCS: Performed by: HOSPITALIST

## 2021-01-11 PROCEDURE — 93010 ELECTROCARDIOGRAM REPORT: CPT | Performed by: INTERNAL MEDICINE

## 2021-01-11 PROCEDURE — 99253 IP/OBS CNSLTJ NEW/EST LOW 45: CPT | Performed by: INTERNAL MEDICINE

## 2021-01-11 PROCEDURE — 94640 AIRWAY INHALATION TREATMENT: CPT

## 2021-01-11 PROCEDURE — 83605 ASSAY OF LACTIC ACID: CPT

## 2021-01-11 PROCEDURE — 1210000000 HC MED SURG R&B

## 2021-01-11 RX ORDER — LORAZEPAM 2 MG/ML
0.5 INJECTION INTRAMUSCULAR EVERY 8 HOURS PRN
Status: DISCONTINUED | OUTPATIENT
Start: 2021-01-11 | End: 2021-01-13 | Stop reason: HOSPADM

## 2021-01-11 RX ADMIN — HYDROMORPHONE HYDROCHLORIDE 0.5 MG: 1 INJECTION, SOLUTION INTRAMUSCULAR; INTRAVENOUS; SUBCUTANEOUS at 21:39

## 2021-01-11 RX ADMIN — HYDROMORPHONE HYDROCHLORIDE 0.5 MG: 1 INJECTION, SOLUTION INTRAMUSCULAR; INTRAVENOUS; SUBCUTANEOUS at 10:06

## 2021-01-11 RX ADMIN — HYDROMORPHONE HYDROCHLORIDE 0.5 MG: 1 INJECTION, SOLUTION INTRAMUSCULAR; INTRAVENOUS; SUBCUTANEOUS at 16:13

## 2021-01-11 RX ADMIN — IPRATROPIUM BROMIDE AND ALBUTEROL SULFATE 1 AMPULE: 2.5; .5 SOLUTION RESPIRATORY (INHALATION) at 20:04

## 2021-01-11 RX ADMIN — LORAZEPAM 0.5 MG: 2 INJECTION INTRAMUSCULAR; INTRAVENOUS at 18:10

## 2021-01-11 RX ADMIN — ONDANSETRON 4 MG: 2 INJECTION INTRAMUSCULAR; INTRAVENOUS at 21:39

## 2021-01-11 RX ADMIN — ACETAMINOPHEN 650 MG: 325 TABLET ORAL at 16:23

## 2021-01-11 RX ADMIN — HYDROMORPHONE HYDROCHLORIDE 0.5 MG: 1 INJECTION, SOLUTION INTRAMUSCULAR; INTRAVENOUS; SUBCUTANEOUS at 04:00

## 2021-01-11 RX ADMIN — ACETAMINOPHEN 650 MG: 325 TABLET ORAL at 09:33

## 2021-01-11 RX ADMIN — SODIUM CHLORIDE: 9 INJECTION, SOLUTION INTRAVENOUS at 09:24

## 2021-01-11 RX ADMIN — ACETAMINOPHEN 650 MG: 325 TABLET ORAL at 04:07

## 2021-01-11 RX ADMIN — IPRATROPIUM BROMIDE AND ALBUTEROL SULFATE 1 AMPULE: 2.5; .5 SOLUTION RESPIRATORY (INHALATION) at 07:02

## 2021-01-11 RX ADMIN — ONDANSETRON 4 MG: 2 INJECTION INTRAMUSCULAR; INTRAVENOUS at 04:00

## 2021-01-11 RX ADMIN — ENOXAPARIN SODIUM 40 MG: 40 INJECTION SUBCUTANEOUS at 09:24

## 2021-01-11 RX ADMIN — IPRATROPIUM BROMIDE AND ALBUTEROL SULFATE 1 AMPULE: 2.5; .5 SOLUTION RESPIRATORY (INHALATION) at 10:44

## 2021-01-11 RX ADMIN — SODIUM CHLORIDE: 9 INJECTION, SOLUTION INTRAVENOUS at 21:39

## 2021-01-11 RX ADMIN — ZOLPIDEM TARTRATE 10 MG: 5 TABLET ORAL at 21:39

## 2021-01-11 RX ADMIN — ONDANSETRON 4 MG: 2 INJECTION INTRAMUSCULAR; INTRAVENOUS at 09:32

## 2021-01-11 ASSESSMENT — PAIN SCALES - GENERAL
PAINLEVEL_OUTOF10: 10
PAINLEVEL_OUTOF10: 9
PAINLEVEL_OUTOF10: 8
PAINLEVEL_OUTOF10: 9

## 2021-01-11 ASSESSMENT — PAIN DESCRIPTION - FREQUENCY: FREQUENCY: INTERMITTENT

## 2021-01-11 ASSESSMENT — PAIN DESCRIPTION - PROGRESSION: CLINICAL_PROGRESSION: NOT CHANGED

## 2021-01-11 ASSESSMENT — PAIN DESCRIPTION - DESCRIPTORS: DESCRIPTORS: SHARP

## 2021-01-11 ASSESSMENT — PAIN DESCRIPTION - ORIENTATION: ORIENTATION: RIGHT

## 2021-01-11 NOTE — PROGRESS NOTES
Verito Guadalupe arrived to room # [id number]. Presented with: abdominal pain  Mental Status: Patient is oriented, alert, coherent, logical, thought processes intact and able to concentrate and follow conversation. Vitals:    01/10/21 2247   BP: (!) 89/42   Pulse: 60   Resp: 17   Temp: 97.7 °F (36.5 °C)   SpO2: 94%     Patient safety contract and falls prevention contract reviewed with patient Yes. Oriented Patient to room. Call light within reach. Yes.   Needs, issues or concerns expressed at this time: no.      Electronically signed by Nanci Cedeno RN on 1/10/2021 at 10:56 PM

## 2021-01-11 NOTE — PLAN OF CARE
Nutrition Problem #1: Severe malnutrition  Intervention: Food and/or Nutrient Delivery: Continue NPO  Nutritional Goals: Pt will progress to an oral diet to meet nutritional needs

## 2021-01-11 NOTE — CONSULTS
GI Consult Note    Pt Name: Vera Villegas  MRN: [id number]  [id number]  YOB: 1965  Admit Date: 1/10/2021  2:04 PM  Date of evaluation: 1/11/2021  Primary Care Physician: Naveed Zee   [id number]       CC: Abnormal imaging of common bile duct    HPI: 53-year-old female with past medical history of scoliosis and COPD presented to the hospital with increasing abdominal pain over the last 5 days point where she began having emesis. On evaluation here at [facility], CT abdomen pelvis was performed and the common bile duct was dilated to 9 mm. Stricture or obstruction could not be excluded. Patient's LFTs remain normal and she denies any recent history of scleral icterus, jaundice, fever, chills, darkened urine, hematemesis, melena, or hematochezia. She is unaware of any family history of gastric, pancreatic, liver, or colon cancers. GI was consulted for further evaluation of her abnormal imaging of her common bile duct. Of note the patient is concerned about having MRI since she has severe claustrophobia. Past Medical History:        Diagnosis Date    COPD (chronic obstructive pulmonary disease) ([facility] [address]     Scoliosis      Past Surgical History:    History reviewed. No pertinent surgical history. Social History:   Social History     Tobacco Use    Smoking status: Current Every Day Smoker     Packs/day: 0.50     Types: Cigarettes    Smokeless tobacco: Never Used   Substance Use Topics    Alcohol use: Never     Frequency: Never     Family History:   History reviewed. No pertinent family history. Home Meds:  Prior to Admission medications    Medication Sig Start Date End Date Taking? Authorizing Provider   zolpidem (AMBIEN) 10 MG tablet Take 5-10 mg by mouth nightly. 1/7/21  Yes Historical Provider, MD   clonazePAM (KLONOPIN) 0.5 MG tablet Take 1 mg by mouth 2 times daily as needed.     Yes Historical Provider, MD   ondansetron (ZOFRAN-ODT) 4 MG disintegrating tablet Take 1 tablet by mouth 3 times daily as needed for Nausea or Vomiting 12/27/19  Yes PERFECTO Puckett NP      Allergies:  Aspirin, Ibuprofen, Iodine, and Sulfa antibiotics      Current Meds:      sodium chloride flush  10 mL Intravenous 2 times per day    enoxaparin  40 mg Subcutaneous Daily    ipratropium-albuterol  1 ampule Inhalation Q4H WA    zolpidem  10 mg Oral Nightly        sodium chloride 75 mL/hr at 01/11/21 0924       PRN Meds:  sodium chloride flush, promethazine **OR** ondansetron, polyethylene glycol, acetaminophen **OR** acetaminophen, potassium chloride **OR** potassium alternative oral replacement **OR** potassium chloride, magnesium sulfate, HYDROmorphone        ROS:  ROS NEGATIVE EXCEPT THOSE MARKED WITH AN \"X\"    GENERAL: [] Fevers, [] chills, [] generalized weakness, [] weight loss, []weight gain, [] anorexia  Skin/Breast: [] jaundice, [] new rashes, [] itching   Eyes/Ears/Nose/Mouth/Throat: [] change in vision, [] double vision, [] light headiness, [] vertigo  CARDIOVASCULAR: [] chest pain, [] palpitations, [] syncope, [] dyspnea on exertion, [] orthopnea  RESPIRATORY: [] SOB, [] cough, [] wheezing, [] hemoptysis  GI: [] dark stools, [] bloody stools, [] BRBPR, [x] abdominal pain, [] GERD like symptoms, [x] nausea, [x] vomiting, [] hematemesis, [] jaundice, [] constipation, [] diarrhea, [] hemorrhoids, [] change in bowel habits, [] bowel incontinence  : [] Dysuria, [] urgency, [] frequency, [] change in urine color, [] discharge  MUSCULOSKELETAL: [] muscle pain, [] muscle swelling, [] joint pain, [] muscle weakness  Neurological/Psychiatric: [] Sensory disturbances, [ motor disturbances, [] difficulty with speech, [] paresthesias, [] paralysis, [] depression, [] anxiety   Allergy/Immunological/Lymphatic/Endocrine: [] anemia, [] rashes, [] polyuria, [] polydypsia      Physical Exam:  Vitals:    01/10/21 2247 01/11/21 0258 01/11/21 0634 01/11/21 1014   BP: (!) 89/42 (!) 80/49 (!) 92/58 (!) 91/56   Pulse: 60 76 92 85   Resp: 17 16 19 18   Temp: 97.7 °F (36.5 °C) 99.5 °F (37.5 °C) 98 °F (36.7 °C) 97.8 °F (36.6 °C)   TempSrc: Temporal Temporal Temporal Temporal   SpO2: 94% 91% 93% 97%   Weight:       Height:           Constitutional: [x] NAD, [x] of stated age, [x] ill appearing  Eyes: [x] conjunctiva clear, [x] Non inflamed irises, [x] no scleral icterus  ENT/Mouth: [x]  Nares patent with pink mucosa, [x] oropharynx clear without exudates or erythema, [x] hearing grossly normal  Head/Neck: [x] symmetrical, [x] supple  Lungs: [x] respirations non labored with good effort, [x] CTA bilaterally, [x] no respiratory distress  Heart: [x] normal S1S2, [x]  Regular rate, [x] pedal pulses preserved 2/4 bilaterally  Abdomen: [x] +BSx4, [x] ND, [x] soft, [x] no peritoneal signs, [x]  epigastric tenderness  Musculoskeletal: [x] Normal gait and station, [x]  Normal nails bilaterally, [x] Normal digits bilaterally, [x] no muscle atrophy  Skin/SubQ: [x] No jaundice, [x] warm, dry skin, [x] no rashes on inspection  Neurologic: [x]  Sensation grossly intact, [x] no slurred speech, [x]  No focal deficits  Psychiatric: [x]  Orientated to person, place, and time; [x] mood and affect unremarkable, [x] memory recent and remote intact      Labs:     Recent Labs     01/10/21  1436 01/11/21  0333   WBC 7.6 5.9   RBC 4.04* 4.37   HGB 11.4* 12.2   HCT 37.4 39.3   MCV 92.6 89.9   MCH 28.2 27.9   MCHC 30.5* 31.0*    244     Recent Labs     01/10/21  1436 01/11/21  0333    140   K 4.3 4.9   ANIONGAP 8 7    107   CO2 26 26   BUN 11 8   CREATININE 0.7 0.6   GLUCOSE 107 83   CALCIUM 8.9 8.3*     Recent Labs     01/11/21  0333   PHOS 3.3     Recent Labs     01/10/21  1436 01/11/21  0333   AST 25 22   ALT 13 10   BILITOT <0.2 <0.2   ALKPHOS 94 85     HgBA1c:  No components found for: HGBA1C  FLP:  No results found for: TRIG, HDL, LDLCALC, LDLDIRECT, LABVLDL  TSH:  No results found for: TSH  Troponin T:   Recent Labs     01/10/21  1436 TROPONINI <0.01     INR:   Recent Labs     01/10/21  1436   INR 0.92       Recent Labs     01/10/21  1436   LIPASE 26       Radiology:  Ct Abdomen Pelvis Wo Contrast Additional Contrast? None    Result Date: 1/10/2021  EXAMINATION: CT ABDOMEN PELVIS WO CONTRAST 1/10/2021 4:39 PM HISTORY: CT ABDOMEN AND PELVIS without contrast 1/10/2021 3:36 PM HISTORY: Abdominal pain COMPARISON: None DLP: 551 mGy cm Automated exposure control was utilized to minimize patient radiation dose. TECHNIQUE: Noncontrast enhanced images of the abdomen and pelvis obtained without oral contrast. FINDINGS: The lung bases and base of the heart are unremarkable. LIVER: No focal liver lesion. BILIARY SYSTEM: The gallbladder is visualized. The common duct is dilated. Stricture or obstruction at the level ampulla Vater may be present. The common duct is 9 mm. Jaqueline [place] PANCREAS: No focal pancreatic lesion. SPLEEN: Unremarkable. KIDNEYS AND ADRENALS: The adrenal glands are unremarkable. The renal contours are normal in size shape and position. .  The ureters are decompressed and normal in appearance. RETROPERITONEUM: No mass, lymphadenopathy or hemorrhage. GI TRACT: The bowel is not well evaluated in the absence of contrast. However moderate amount of stool is present throughout the colon. The appendix is visualized. . . OTHER: There is no mesenteric mass, lymphadenopathy or fluid collection The skeletal structures appear intact. Jaqueline [place] PELVIS: The muscle and fat planes in the pelvis appear symmetric. . The urinary bladder is normal in appearance. 1. The common duct is dilated to 9 mm. Stricture or obstruction at the level ampulla Vater cannot be excluded. 2. Moderate amount of stool is noted in the colon. . Signed by Dr Zack Terry on 1/10/2021 4:43 PM    Xr Chest Portable    Result Date: 1/10/2021  EXAMINATION: XR CHEST PORTABLE 1/10/2021 3:17 PM HISTORY: XR CHEST PORTABLE 1/10/2021 2:07 PM HISTORY: Short of air COMPARISON: None.  FINDINGS: The lungs are clear. The cardiomediastinal silhouette and pulmonary vascularity are within normal limits. The osseous structures and surrounding soft tissues demonstrate no acute abnormality. 1. No radiographic evidence of acute cardiopulmonary process. Signed by Dr Neli Verma on 1/10/2021 3:18 PM      Assessment:  1. Dilated common bile duct  2. Abdominal pain  3. Nausea/vomiting    Plan:  -Patient is a 61-year-old female no significant history of GI issues and presented with nausea and vomiting that worsened over the last 5 days. Our CT abdomen and pelvis identified a dilated common bile duct at 9 mm which is large for her age she has no history of cholecystectomy. Her LFTs remain normal and there is no evidence of complete obstruction at this time. No recent scleral icterus or jaundice to indicate any recent obstruction. The patient is known to be claustrophobic and she was worried about having an MRI performed and this may result in a poor study.   We will schedule for EUS tomorrow  -N.p.o. after midnight  -Lovenox placed on hold    Moody Thurman DO

## 2021-01-11 NOTE — PROGRESS NOTES
Electronically signed by Vicky Barrientos MS, RD, LD on 1/11/21 at 1:57 PM CST    Contact: [phone]

## 2021-01-12 ENCOUNTER — ANESTHESIA EVENT (OUTPATIENT)
Dept: ENDOSCOPY | Age: 56
DRG: 444 | End: 2021-01-12
Payer: MEDICAID

## 2021-01-12 ENCOUNTER — ANESTHESIA (OUTPATIENT)
Dept: ENDOSCOPY | Age: 56
DRG: 444 | End: 2021-01-12
Payer: MEDICAID

## 2021-01-12 VITALS — SYSTOLIC BLOOD PRESSURE: 96 MMHG | DIASTOLIC BLOOD PRESSURE: 53 MMHG | OXYGEN SATURATION: 96 % | TEMPERATURE: 97 F

## 2021-01-12 PROBLEM — K29.30 CHRONIC SUPERFICIAL GASTRITIS WITHOUT BLEEDING: Status: ACTIVE | Noted: 2021-01-12

## 2021-01-12 LAB
ALBUMIN SERPL-MCNC: 3.5 G/DL (ref 3.5–5.2)
ALP BLD-CCNC: 80 U/L (ref 35–104)
ALT SERPL-CCNC: 10 U/L (ref 5–33)
ANION GAP SERPL CALCULATED.3IONS-SCNC: 6 MMOL/L (ref 7–19)
AST SERPL-CCNC: 19 U/L (ref 5–32)
BILIRUB SERPL-MCNC: <0.2 MG/DL (ref 0.2–1.2)
BUN BLDV-MCNC: 7 MG/DL (ref 6–20)
CALCIUM SERPL-MCNC: 8.4 MG/DL (ref 8.6–10)
CHLORIDE BLD-SCNC: 104 MMOL/L (ref 98–111)
CO2: 30 MMOL/L (ref 22–29)
CREAT SERPL-MCNC: 0.6 MG/DL (ref 0.5–0.9)
GFR AFRICAN AMERICAN: >59
GFR NON-AFRICAN AMERICAN: >60
GLUCOSE BLD-MCNC: 91 MG/DL (ref 74–109)
HCT VFR BLD CALC: 37.1 % (ref 37–47)
HEMOGLOBIN: 11.7 G/DL (ref 12–16)
MCH RBC QN AUTO: 28.3 PG (ref 27–31)
MCHC RBC AUTO-ENTMCNC: 31.5 G/DL (ref 33–37)
MCV RBC AUTO: 89.6 FL (ref 81–99)
PDW BLD-RTO: 13.2 % (ref 11.5–14.5)
PLATELET # BLD: 218 K/UL (ref 130–400)
PMV BLD AUTO: 8.4 FL (ref 9.4–12.3)
POTASSIUM REFLEX MAGNESIUM: 5 MMOL/L (ref 3.5–5)
RBC # BLD: 4.14 M/UL (ref 4.2–5.4)
SODIUM BLD-SCNC: 140 MMOL/L (ref 136–145)
TOTAL PROTEIN: 5.5 G/DL (ref 6.6–8.7)
WBC # BLD: 6.7 K/UL (ref 4.8–10.8)

## 2021-01-12 PROCEDURE — 1210000000 HC MED SURG R&B

## 2021-01-12 PROCEDURE — 6370000000 HC RX 637 (ALT 250 FOR IP): Performed by: PHYSICIAN ASSISTANT

## 2021-01-12 PROCEDURE — 7100000001 HC PACU RECOVERY - ADDTL 15 MIN: Performed by: INTERNAL MEDICINE

## 2021-01-12 PROCEDURE — 2580000003 HC RX 258: Performed by: INTERNAL MEDICINE

## 2021-01-12 PROCEDURE — 6360000002 HC RX W HCPCS: Performed by: PHYSICIAN ASSISTANT

## 2021-01-12 PROCEDURE — 6360000002 HC RX W HCPCS: Performed by: INTERNAL MEDICINE

## 2021-01-12 PROCEDURE — 2500000003 HC RX 250 WO HCPCS

## 2021-01-12 PROCEDURE — 3609012400 HC EGD TRANSORAL BIOPSY SINGLE/MULTIPLE: Performed by: INTERNAL MEDICINE

## 2021-01-12 PROCEDURE — BD47ZZZ ULTRASONOGRAPHY OF GASTROINTESTINAL TRACT: ICD-10-PCS | Performed by: INTERNAL MEDICINE

## 2021-01-12 PROCEDURE — 7100000000 HC PACU RECOVERY - FIRST 15 MIN: Performed by: INTERNAL MEDICINE

## 2021-01-12 PROCEDURE — 2580000003 HC RX 258: Performed by: PHYSICIAN ASSISTANT

## 2021-01-12 PROCEDURE — 94640 AIRWAY INHALATION TREATMENT: CPT

## 2021-01-12 PROCEDURE — 85027 COMPLETE CBC AUTOMATED: CPT

## 2021-01-12 PROCEDURE — 36415 COLL VENOUS BLD VENIPUNCTURE: CPT

## 2021-01-12 PROCEDURE — 6370000000 HC RX 637 (ALT 250 FOR IP): Performed by: INTERNAL MEDICINE

## 2021-01-12 PROCEDURE — 43259 EGD US EXAM DUODENUM/JEJUNUM: CPT | Performed by: INTERNAL MEDICINE

## 2021-01-12 PROCEDURE — 80053 COMPREHEN METABOLIC PANEL: CPT

## 2021-01-12 PROCEDURE — 3609018500 HC EGD US SCOPE W/ADJACENT STRUCTURES: Performed by: INTERNAL MEDICINE

## 2021-01-12 PROCEDURE — 2709999900 HC NON-CHARGEABLE SUPPLY: Performed by: INTERNAL MEDICINE

## 2021-01-12 PROCEDURE — 3700000000 HC ANESTHESIA ATTENDED CARE: Performed by: INTERNAL MEDICINE

## 2021-01-12 PROCEDURE — 6360000002 HC RX W HCPCS: Performed by: HOSPITALIST

## 2021-01-12 PROCEDURE — 2580000003 HC RX 258

## 2021-01-12 PROCEDURE — 43239 EGD BIOPSY SINGLE/MULTIPLE: CPT | Performed by: INTERNAL MEDICINE

## 2021-01-12 PROCEDURE — 6360000002 HC RX W HCPCS

## 2021-01-12 PROCEDURE — 0DB68ZX EXCISION OF STOMACH, VIA NATURAL OR ARTIFICIAL OPENING ENDOSCOPIC, DIAGNOSTIC: ICD-10-PCS | Performed by: INTERNAL MEDICINE

## 2021-01-12 PROCEDURE — 3700000001 HC ADD 15 MINUTES (ANESTHESIA): Performed by: INTERNAL MEDICINE

## 2021-01-12 RX ORDER — PROPOFOL 10 MG/ML
INJECTION, EMULSION INTRAVENOUS CONTINUOUS PRN
Status: DISCONTINUED | OUTPATIENT
Start: 2021-01-12 | End: 2021-01-12 | Stop reason: SDUPTHER

## 2021-01-12 RX ORDER — LIDOCAINE HYDROCHLORIDE 10 MG/ML
INJECTION, SOLUTION EPIDURAL; INFILTRATION; INTRACAUDAL; PERINEURAL PRN
Status: DISCONTINUED | OUTPATIENT
Start: 2021-01-12 | End: 2021-01-12 | Stop reason: SDUPTHER

## 2021-01-12 RX ORDER — FENTANYL CITRATE 50 UG/ML
INJECTION, SOLUTION INTRAMUSCULAR; INTRAVENOUS PRN
Status: DISCONTINUED | OUTPATIENT
Start: 2021-01-12 | End: 2021-01-12 | Stop reason: SDUPTHER

## 2021-01-12 RX ORDER — PANTOPRAZOLE SODIUM 40 MG/1
40 TABLET, DELAYED RELEASE ORAL
Status: DISCONTINUED | OUTPATIENT
Start: 2021-01-13 | End: 2021-01-13 | Stop reason: HOSPADM

## 2021-01-12 RX ORDER — SODIUM CHLORIDE, SODIUM LACTATE, POTASSIUM CHLORIDE, CALCIUM CHLORIDE 600; 310; 30; 20 MG/100ML; MG/100ML; MG/100ML; MG/100ML
INJECTION, SOLUTION INTRAVENOUS CONTINUOUS PRN
Status: DISCONTINUED | OUTPATIENT
Start: 2021-01-12 | End: 2021-01-12 | Stop reason: SDUPTHER

## 2021-01-12 RX ORDER — PROPOFOL 10 MG/ML
INJECTION, EMULSION INTRAVENOUS PRN
Status: DISCONTINUED | OUTPATIENT
Start: 2021-01-12 | End: 2021-01-12 | Stop reason: SDUPTHER

## 2021-01-12 RX ADMIN — ACETAMINOPHEN 650 MG: 325 TABLET ORAL at 19:38

## 2021-01-12 RX ADMIN — SODIUM CHLORIDE, PRESERVATIVE FREE 10 ML: 5 INJECTION INTRAVENOUS at 08:35

## 2021-01-12 RX ADMIN — ACETAMINOPHEN 650 MG: 325 TABLET ORAL at 15:07

## 2021-01-12 RX ADMIN — SODIUM CHLORIDE, SODIUM LACTATE, POTASSIUM CHLORIDE, AND CALCIUM CHLORIDE: 600; 310; 30; 20 INJECTION, SOLUTION INTRAVENOUS at 09:17

## 2021-01-12 RX ADMIN — ACETAMINOPHEN 650 MG: 325 TABLET ORAL at 03:33

## 2021-01-12 RX ADMIN — IPRATROPIUM BROMIDE AND ALBUTEROL SULFATE 1 AMPULE: 2.5; .5 SOLUTION RESPIRATORY (INHALATION) at 15:18

## 2021-01-12 RX ADMIN — LORAZEPAM 0.5 MG: 2 INJECTION INTRAMUSCULAR; INTRAVENOUS at 15:08

## 2021-01-12 RX ADMIN — HYDROMORPHONE HYDROCHLORIDE 0.5 MG: 1 INJECTION, SOLUTION INTRAMUSCULAR; INTRAVENOUS; SUBCUTANEOUS at 10:31

## 2021-01-12 RX ADMIN — LORAZEPAM 0.5 MG: 2 INJECTION INTRAMUSCULAR; INTRAVENOUS at 21:26

## 2021-01-12 RX ADMIN — ZOLPIDEM TARTRATE 10 MG: 5 TABLET ORAL at 19:38

## 2021-01-12 RX ADMIN — PROPOFOL 120 MCG/KG/MIN: 10 INJECTION, EMULSION INTRAVENOUS at 09:24

## 2021-01-12 RX ADMIN — LORAZEPAM 0.5 MG: 2 INJECTION INTRAMUSCULAR; INTRAVENOUS at 04:35

## 2021-01-12 RX ADMIN — HYDROMORPHONE HYDROCHLORIDE 0.5 MG: 1 INJECTION, SOLUTION INTRAMUSCULAR; INTRAVENOUS; SUBCUTANEOUS at 22:32

## 2021-01-12 RX ADMIN — IPRATROPIUM BROMIDE AND ALBUTEROL SULFATE 1 AMPULE: 2.5; .5 SOLUTION RESPIRATORY (INHALATION) at 10:55

## 2021-01-12 RX ADMIN — SODIUM CHLORIDE: 9 INJECTION, SOLUTION INTRAVENOUS at 12:26

## 2021-01-12 RX ADMIN — LIDOCAINE HYDROCHLORIDE 100 MG: 10 INJECTION, SOLUTION EPIDURAL; INFILTRATION; INTRACAUDAL; PERINEURAL at 09:22

## 2021-01-12 RX ADMIN — PROPOFOL 50 MG: 10 INJECTION, EMULSION INTRAVENOUS at 09:22

## 2021-01-12 RX ADMIN — HYDROMORPHONE HYDROCHLORIDE 0.5 MG: 1 INJECTION, SOLUTION INTRAMUSCULAR; INTRAVENOUS; SUBCUTANEOUS at 03:29

## 2021-01-12 RX ADMIN — HYDROMORPHONE HYDROCHLORIDE 0.5 MG: 1 INJECTION, SOLUTION INTRAMUSCULAR; INTRAVENOUS; SUBCUTANEOUS at 16:32

## 2021-01-12 RX ADMIN — FENTANYL CITRATE 100 MCG: 50 INJECTION INTRAMUSCULAR; INTRAVENOUS at 09:22

## 2021-01-12 RX ADMIN — IPRATROPIUM BROMIDE AND ALBUTEROL SULFATE 1 AMPULE: 2.5; .5 SOLUTION RESPIRATORY (INHALATION) at 07:45

## 2021-01-12 ASSESSMENT — PAIN DESCRIPTION - ORIENTATION
ORIENTATION: MID
ORIENTATION: MID

## 2021-01-12 ASSESSMENT — PAIN SCALES - GENERAL
PAINLEVEL_OUTOF10: 9
PAINLEVEL_OUTOF10: 10
PAINLEVEL_OUTOF10: 4
PAINLEVEL_OUTOF10: 2
PAINLEVEL_OUTOF10: 9

## 2021-01-12 ASSESSMENT — PAIN - FUNCTIONAL ASSESSMENT: PAIN_FUNCTIONAL_ASSESSMENT: ACTIVITIES ARE NOT PREVENTED

## 2021-01-12 ASSESSMENT — PAIN DESCRIPTION - PAIN TYPE
TYPE: ACUTE PAIN
TYPE: ACUTE PAIN

## 2021-01-12 ASSESSMENT — COPD QUESTIONNAIRES: CAT_SEVERITY: SEVERE

## 2021-01-12 ASSESSMENT — PAIN DESCRIPTION - DESCRIPTORS: DESCRIPTORS: ACHING;HEADACHE

## 2021-01-12 ASSESSMENT — PAIN DESCRIPTION - LOCATION: LOCATION: ABDOMEN

## 2021-01-12 ASSESSMENT — PAIN DESCRIPTION - DIRECTION: RADIATING_TOWARDS: NO

## 2021-01-12 NOTE — PROGRESS NOTES
[place], [place], [address]        DEPARTMENT OF HOSPITALIST MEDICINE        PROGRESS  NOTE:    NOTE: Portions of this note have been copied forward, however, changed to reflect the most current clinical status of this patient. Patient:  Negro Whipple  YOB: 1965  Date of Service: 1/11/2021  MRN: [id number]   Acct: [de-identified]   Primary Care Physician: Mirian Elise  Advance Directive: Full Code  Admit Date: 1/10/2021       Hospital Day: 1      CHIEF COMPLAINT:  Chief Complaint   Patient presents with    Abdominal Pain     states abdominal pain and \"swelling\" states \"feels like someone is squeezing me\"       SUBJECTIVE:    Patient still complaining of pain in the abdomen. She is concerned about MRCP as she is claustrophobic! Deanna Gill  COURSE:    1/11/2021  Patient is complaining of abdominal pain is primarily when she is here. She is very anxious about getting the MRCP done because she is still phobic. We offered antianxiety medications but she is still concerned. GI discontinued the MRCP because if she moves a lot, the result would not be accurate. Patient will be kept n.p.o. after midnight and undergo ERCP and/or EUS tomorrow. 1/10/2021  History Of Present Illness: The patient is a 50 y.o. female with PMH of COPD who presented to [facility] ED complaining of abdominal pain. Pt is sitting up in bed in no acute distress, although anxious. Her abdomen is mildly distended and is tender. She states she has been having abdominal pain for a while but over the last 4 days it has become more severe and she has been throwing up. She also admits to some shortness of breath. She has hx of COPD and admits to smoking about 10 cigarettes a day. Pt denies chest pain, diarrhea, or any other complaints. Work up in ED reveals VSS. Lactic acid at 2.2.  D-dimer mild elevation but with SpO2 at 98% there is low suspicion for PE. GI panel normal. CT abdomen reveals dilation of common bile duct at 9mm. GI consulted from ED and planning MRCP in AM.       REVIEW  OF  SYSTEMS:    Constitutional:  No fevers, chills, +nausea, vomiting, + tiredness and fatigue   Lungs:   No hemoptysis, pleurisy, cough, SOB   Heart:  No chest pressure with exertion, palpitations,    Abdomen:   No new masses, ++ abdominal pain    Extremities: + difficulty ambulating due to abdominal pain, no new lesions   Neurologic: No new motor or sensory changes       PAST MEDICAL HISTORY:  Past Medical History:   Diagnosis Date    COPD (chronic obstructive pulmonary disease) ([facility] [address]     Scoliosis          PAST SURGICAL HISTORY:  History reviewed. No pertinent surgical history. FAMILY HISTORY:  History reviewed. No pertinent family history. OBJECTIVE:  BP (!) 76/45   Pulse 91   Temp 99 °F (37.2 °C) (Temporal)   Resp 22   Ht 5' 1\" (1.549 m)   Wt 96 lb (43.5 kg)   SpO2 90%   BMI 18.14 kg/m²   I/O this shift:   In: 552.4 [I.V.:552.4]  Out: -     PHYSICAL  EXAMINATION:    JONEL:  Awake, alert, oriented x 3, patient appears tired and fatigued   Head/Eyes:  Normocephalic, atraumatic, EOMI and PERRLA bilaterally   Respiratory:   Bilateral decreased air entry in both lung fields, mild B/L crackles, symmetric expansion of chest   Cardiovascular:  Regular rate and rhythm, S1+S2+0, no murmurs/rubs   Abdomen:   Soft, + abdominal tenderness on palpation, bowel sounds +ve, no organomegaly   Extremities: Moves all, decreased range of motion, no edema   Neurologic: Awake, alert, oriented x 3, cranial nerves II-XII intact, no focal neurological deficits, sensory system intact   Psychiatric: + mild anxiety, non-suicidal       CURRENT MEDICATIONS:  Scheduled:   sodium chloride flush  10 mL Intravenous 2 times per day    [Held by provider] enoxaparin  40 mg Subcutaneous Daily    ipratropium-albuterol  1 ampule Inhalation Q4H WA    zolpidem  10 mg Oral Nightly        PRN:      LORazepam, 0.5 mg, Q8H PRN     sodium chloride flush, 10 mL, PRN      promethazine, 12.5 mg, Q6H PRN    Or      ondansetron, 4 mg, Q6H PRN      polyethylene glycol, 17 g, Daily PRN      acetaminophen, 650 mg, Q6H PRN    Or      acetaminophen, 650 mg, Q6H PRN      potassium chloride, 40 mEq, PRN    Or      potassium alternative oral replacement, 40 mEq, PRN    Or      potassium chloride, 10 mEq, PRN      magnesium sulfate, 2 g, PRN      HYDROmorphone, 0.5 mg, Q6H PRN        Infusions:   sodium chloride 75 mL/hr at 01/11/21 0924       Laboratory Data:  Recent Labs     01/10/21  1436 01/11/21  0333   WBC 7.6 5.9   HGB 11.4* 12.2    244     Recent Labs     01/10/21  1436 01/11/21  0333    140   K 4.3 4.9    107   CO2 26 26   BUN 11 8   CREATININE 0.7 0.6   GLUCOSE 107 83     Recent Labs     01/10/21  1436 01/11/21  0333   AST 25 22   ALT 13 10   BILITOT <0.2 <0.2   ALKPHOS 94 85     Troponin T:   Recent Labs     01/10/21  1436   TROPONINI <0.01     Pro-BNP: No results for input(s): BNP in the last 72 hours. INR:   Recent Labs     01/10/21  1436   INR 0.92     UA:  Recent Labs     01/10/21  1554   COLORU YELLOW   PHUR 5.0   CLARITYU Clear   SPECGRAV 1.014   LEUKOCYTESUR Negative   UROBILINOGEN 0.2   BILIRUBINUR Negative   BLOODU Negative   GLUCOSEU Negative     A1C: No results for input(s): LABA1C in the last 72 hours. ABG:No results for input(s): PHART, WGA4WMF, PO2ART, ZXQ3NFC, BEART, HGBAE, Q3DZHKYF, CARBOXHGBART in the last 72 hours. Imaging:    Ct Abdomen Pelvis Wo Contrast Additional Contrast? None    Result Date: 1/10/2021  1. The common duct is dilated to 9 mm. Stricture or obstruction at the level ampulla Vater cannot be excluded. 2. Moderate amount of stool is noted in the colon. . Signed by Dr Charlie Valencia on 1/10/2021 4:43 PM    Xr Chest Portable    Result Date: 1/10/2021  1. No radiographic evidence of acute cardiopulmonary process.  Signed by Dr Charlie Valencia on 1/10/2021 3:18 PM       ASSESSMENT & PLAN:    Principal Problem:    Dilation of common bile duct  Active Problems:    COPD (chronic obstructive pulmonary disease) (HCC)    Severe malnutrition (HCC)    Generalized anxiety disorder  Resolved Problems:    * No resolved hospital problems. *      Continue with current medications  Continue with IV antibiotics for pain control  Patient is claustrophobic to MRI hence MRCP was held  Patient given Ativan 0.5 mg IV every 8 hours as needed for anxiety  Keep patient n.p.o. after midnight  Patient will be taken to the endoscopy suite for ERCP and/or EUS in a.m.  GI consult reviewed. And agreed. Follow-up closely as per GI recommendations      Repeat labs in a.m. Electrolyte replacement as per protocol. Patient will be monitored very closely on the floor. Further recommendations as per the hospital course. Discharge Plan: In the few days once GI work-up is complete, and patient is improved clinically and symptomatically and cleared by GI for discharge    Patient  is on DVT prophylaxis  Current medications reviewed  Lab work reviewed  Radiology/Chest x-ray films reviewed  Treatment recommendations from suspecialities reviewed, appreciated and agreed with  Discussed with the nurse and addressed all questions/concerns  Discussed with Patient and/or Family at the bedside in detail . .. they understand and agree with the management plan. Angelito Choe MD  1/11/2021 9:15 PM      DISCLAIMER: This note was created with electronic voice recognition which does have occasional errors. If you have any questions regarding the content within the note please do not hesitate to contact me. .. Thanks.

## 2021-01-12 NOTE — ANESTHESIA PRE PROCEDURE
 potassium chloride (KLOR-CON M) extended release tablet 40 mEq  40 mEq Oral PRN Mikki Pepper, PA-C        Or    potassium bicarb-citric acid (EFFER-K) effervescent tablet 40 mEq  40 mEq Oral PRN Mikki Pepper, PA-C        Or    potassium chloride 10 mEq/100 mL IVPB (Peripheral Line)  10 mEq Intravenous PRN Mikki Pepper, PA-C        magnesium sulfate 2 g in 50 mL IVPB premix  2 g Intravenous PRN Mikki Pepper, PA-C        [Held by provider] enoxaparin (LOVENOX) injection 40 mg  40 mg Subcutaneous Daily Mikki Pepper, PA-C   40 mg at 01/11/21 0924    HYDROmorphone HCl PF (DILAUDID) injection 0.5 mg  0.5 mg Intravenous Q6H PRN Mikki Pepper, PA-C   0.5 mg at 01/12/21 0329    ipratropium-albuterol (DUONEB) nebulizer solution 1 ampule  1 ampule Inhalation Q4H WA Mikki Pepper, PA-C   1 ampule at 01/12/21 0745    zolpidem (AMBIEN) tablet 10 mg  10 mg Oral Nightly Farooq Limon MD   10 mg at 01/11/21 2139       Allergies: Allergies   Allergen Reactions    Aspirin Hives    Ibuprofen     Iodine     Sulfa Antibiotics        Problem List:    Patient Active Problem List   Diagnosis Code    Dilation of common bile duct K83.8    COPD [facility]chronic obstructive pulmonary disease) ([facility]) J44.9    Severe malnutrition ([facility]) E43    Generalized anxiety disorder F41.1       Past Medical History:        Diagnosis Date    COPD (chronic obstructive pulmonary disease) ([facility] [address]     Scoliosis        Past Surgical History:  History reviewed. No pertinent surgical history.     Social History:    Social History     Tobacco Use    Smoking status: Current Every Day Smoker     Packs/day: 0.50     Types: Cigarettes    Smokeless tobacco: Never Used   Substance Use Topics    Alcohol use: Never     Frequency: Never                                Ready to quit: Not Answered  Counseling given: Not Answered      Vital Signs (Current):   Vitals:    01/12/21 0037 01/12/21 0244 01/12/21 0417 01/12/21 0637   BP:  (!) 84/48  (!) 92/56 Pulse:  75  73   Resp:  14  18   Temp:  99.4 °F (37.4 °C)  97.7 °F (36.5 °C)   TempSrc:  Temporal  Temporal   SpO2: 90% 90%  95%   Weight:   102 lb 11.2 oz (46.6 kg)    Height:                                                  BP Readings from Last 3 Encounters:   01/12/21 (!) 92/56   12/10/20 102/74   09/12/20 130/71       NPO Status:                                                                                 BMI:   Wt Readings from Last 3 Encounters:   01/12/21 102 lb 11.2 oz (46.6 kg)   12/10/20 101 lb (45.8 kg)   09/12/20 105 lb (47.6 kg)     Body mass index is 19.4 kg/m². CBC:   Lab Results   Component Value Date    WBC 6.7 01/12/2021    RBC 4.14 01/12/2021    HGB 11.7 01/12/2021    HCT 37.1 01/12/2021    MCV 89.6 01/12/2021    RDW 13.2 01/12/2021     01/12/2021       CMP:   Lab Results   Component Value Date     01/12/2021    K 5.0 01/12/2021     01/12/2021    CO2 30 01/12/2021    BUN 7 01/12/2021    CREATININE 0.6 01/12/2021    GFRAA >59 01/12/2021    LABGLOM >60 01/12/2021    GLUCOSE 91 01/12/2021    PROT 5.5 01/12/2021    CALCIUM 8.4 01/12/2021    BILITOT <0.2 01/12/2021    ALKPHOS 80 01/12/2021    AST 19 01/12/2021    ALT 10 01/12/2021       POC Tests: No results for input(s): POCGLU, POCNA, POCK, POCCL, POCBUN, POCHEMO, POCHCT in the last 72 hours.     Coags:   Lab Results   Component Value Date    PROTIME 12.2 01/10/2021    INR 0.92 01/10/2021    APTT 32.0 01/10/2021       HCG (If Applicable): No results found for: PREGTESTUR, PREGSERUM, HCG, HCGQUANT     ABGs: No results found for: PHART, PO2ART, RLO9XTR, ZAL0FAS, BEART, I2MTKQEB     Type & Screen (If Applicable):  No results found for: LABABO, LABRH    Drug/Infectious Status (If Applicable):  No results found for: HIV, HEPCAB    COVID-19 Screening (If Applicable):   Lab Results   Component Value Date    COVID19 Not Detected 01/10/2021    COVID19 NOT DETECTED 08/18/2020         Anesthesia Evaluation Patient summary reviewed and Nursing notes reviewed  Airway: Mallampati: II  TM distance: >3 FB   Neck ROM: full  Mouth opening: > = 3 FB Dental:    (+) edentulous      Pulmonary:   (+) COPD: severe,                             Cardiovascular:  Exercise tolerance: poor (<4 METS),            Beta Blocker:  Not on Beta Blocker         Neuro/Psych:   (+) psychiatric history:   (-) neuromuscular disease            ROS comment: LEE ANN  Scoliosis GI/Hepatic/Renal:            ROS comment: Severe Malnutrition. Endo/Other: Negative Endo/Other ROS             Pt had no PAT visit       Abdominal:           Vascular:                                      Anesthesia Plan      general and TIVA     ASA 2       Induction: intravenous. Anesthetic plan and risks discussed with patient.                       Irma Gonzalez, PERFECTO - CRNA   1/12/2021

## 2021-01-12 NOTE — OP NOTE
Referring/Primary Care Provider: Myah Smith ref. provider found    Date of Procedure: 01/12/21    Procedure:   1. EGD with Endoscopic Ultrasound   2. EGD with biopsy    Indications:   1. Abdominal pain, dilated CBD on imaging   2. LFTs normal.   3. Lipase normal.     Anesthesia:  Sedation was administered by anesthesia who monitored the patient during the procedure. Procedure:   After reviewing the patient's chart, H&P, medications, obtaining informed consent, and discussing risks benefits and alternatives to the procedure the patient was placed in the left lateral decubitus position. For the EGD portion of the exam, a forward viewing EGD scope was used and for the EUS, an oblique viewing Olympus 160 Radial EUS scope was lubricated and inserted through the mouth into the oropharynx. Under indirect visualization, the upper esophagus was intubated. The scope was advanced to the level of the third portion of duodenum with limited views of the esophageal mucosa. Findings and maneuvers are listed in impression below. The patient tolerated the procedure well. There were no immediate complications. Findings:   Endoscopic Finding:   - esophagus appeared normal  - there were noted mucosal changes of gastritis seen - biopsied for H.Pylori  - duodenum appeared normal. No ampullary mass seen. Endosonographic Findings:  - The celiac axis and associated vascular structures was identified and examined. No concerning or malignant lymphadenopathy was identified.     - Limited views of the left lobe of the liver revealed no obvious IHD dilation. No focal hepatic mass seen. - The EUS scope was advanced to the duodenal bulb. The CBD was visualized and followed to the level of the ampulla. There was noted CBD dilation to 9.3mm maximum. No focal stricture or mass was seen. No filling defects seen. - Pancreas: the MPD was visualized and appeared non-dilated. No focal pancreatic mass was seen.

## 2021-01-12 NOTE — ANESTHESIA POSTPROCEDURE EVALUATION
Department of Anesthesiology  Postprocedure Note    Patient: Concetta Nation  MRN: [id number]  [place]: 1965  Date of evaluation: 1/12/2021  Time:  9:49 AM     Procedure Summary     Date: 01/12/21 Room / Location: [address]    Anesthesia Start: 6843 Anesthesia Stop: 0426    Procedures:       ENDOSCOPIC ULTRASOUND (N/A Abdomen)      EGD BIOPSY (Abdomen) Diagnosis: (CBD dilation,)    Surgeons: Citlaly Vinson MD Responsible Provider: PERFECTO Pitts CRNA    Anesthesia Type: general, TIVA ASA Status: 2          Anesthesia Type: general, TIVA    Sandra Phase I:      Sandra Phase II:      Last vitals: Reviewed and per EMR flowsheets.        Anesthesia Post Evaluation    Patient location during evaluation: bedside  Patient participation: complete - patient participated  Level of consciousness: awake and alert  Pain score: 0  Airway patency: patent  Nausea & Vomiting: no nausea and no vomiting  Complications: no  Cardiovascular status: hemodynamically stable  Respiratory status: nonlabored ventilation, spontaneous ventilation, room air and acceptable  Hydration status: euvolemic

## 2021-01-13 ENCOUNTER — TELEPHONE (OUTPATIENT)
Dept: INTERNAL MEDICINE | Facility: CLINIC | Age: 56
End: 2021-01-13

## 2021-01-13 VITALS
WEIGHT: 102.7 LBS | OXYGEN SATURATION: 90 % | BODY MASS INDEX: 19.39 KG/M2 | RESPIRATION RATE: 17 BRPM | HEART RATE: 92 BPM | SYSTOLIC BLOOD PRESSURE: 111 MMHG | HEIGHT: 61 IN | TEMPERATURE: 97.8 F | DIASTOLIC BLOOD PRESSURE: 69 MMHG

## 2021-01-13 PROBLEM — K29.70 GASTRITIS: Status: ACTIVE | Noted: 2021-01-13

## 2021-01-13 LAB
ALBUMIN SERPL-MCNC: 3.7 G/DL (ref 3.5–5.2)
ALP BLD-CCNC: 91 U/L (ref 35–104)
ALT SERPL-CCNC: 9 U/L (ref 5–33)
ANION GAP SERPL CALCULATED.3IONS-SCNC: 5 MMOL/L (ref 7–19)
AST SERPL-CCNC: 18 U/L (ref 5–32)
BILIRUB SERPL-MCNC: 0.4 MG/DL (ref 0.2–1.2)
BUN BLDV-MCNC: 6 MG/DL (ref 6–20)
CALCIUM SERPL-MCNC: 8.8 MG/DL (ref 8.6–10)
CHLORIDE BLD-SCNC: 103 MMOL/L (ref 98–111)
CO2: 32 MMOL/L (ref 22–29)
CREAT SERPL-MCNC: 0.5 MG/DL (ref 0.5–0.9)
GFR AFRICAN AMERICAN: >59
GFR NON-AFRICAN AMERICAN: >60
GLUCOSE BLD-MCNC: 90 MG/DL (ref 74–109)
HCT VFR BLD CALC: 38.5 % (ref 37–47)
HEMOGLOBIN: 12.3 G/DL (ref 12–16)
MCH RBC QN AUTO: 28.2 PG (ref 27–31)
MCHC RBC AUTO-ENTMCNC: 31.9 G/DL (ref 33–37)
MCV RBC AUTO: 88.3 FL (ref 81–99)
PDW BLD-RTO: 13 % (ref 11.5–14.5)
PLATELET # BLD: 232 K/UL (ref 130–400)
PMV BLD AUTO: 8.9 FL (ref 9.4–12.3)
POTASSIUM REFLEX MAGNESIUM: 3.9 MMOL/L (ref 3.5–5)
RBC # BLD: 4.36 M/UL (ref 4.2–5.4)
SODIUM BLD-SCNC: 140 MMOL/L (ref 136–145)
TOTAL PROTEIN: 6.5 G/DL (ref 6.6–8.7)
WBC # BLD: 12.3 K/UL (ref 4.8–10.8)

## 2021-01-13 PROCEDURE — 6360000002 HC RX W HCPCS: Performed by: INTERNAL MEDICINE

## 2021-01-13 PROCEDURE — 6370000000 HC RX 637 (ALT 250 FOR IP): Performed by: INTERNAL MEDICINE

## 2021-01-13 PROCEDURE — 36415 COLL VENOUS BLD VENIPUNCTURE: CPT

## 2021-01-13 PROCEDURE — 2580000003 HC RX 258: Performed by: INTERNAL MEDICINE

## 2021-01-13 PROCEDURE — 85027 COMPLETE CBC AUTOMATED: CPT

## 2021-01-13 PROCEDURE — 80053 COMPREHEN METABOLIC PANEL: CPT

## 2021-01-13 PROCEDURE — 94640 AIRWAY INHALATION TREATMENT: CPT

## 2021-01-13 RX ORDER — PANTOPRAZOLE SODIUM 40 MG/1
40 TABLET, DELAYED RELEASE ORAL
Qty: 30 TABLET | Refills: 0 | Status: SHIPPED | OUTPATIENT
Start: 2021-01-14 | End: 2021-01-22 | Stop reason: SDUPTHER

## 2021-01-13 RX ADMIN — PANTOPRAZOLE SODIUM 40 MG: 40 TABLET, DELAYED RELEASE ORAL at 05:34

## 2021-01-13 RX ADMIN — LORAZEPAM 0.5 MG: 2 INJECTION INTRAMUSCULAR; INTRAVENOUS at 05:34

## 2021-01-13 RX ADMIN — SODIUM CHLORIDE, PRESERVATIVE FREE 10 ML: 5 INJECTION INTRAVENOUS at 10:04

## 2021-01-13 RX ADMIN — HYDROMORPHONE HYDROCHLORIDE 0.5 MG: 1 INJECTION, SOLUTION INTRAMUSCULAR; INTRAVENOUS; SUBCUTANEOUS at 04:18

## 2021-01-13 RX ADMIN — SODIUM CHLORIDE: 9 INJECTION, SOLUTION INTRAVENOUS at 04:18

## 2021-01-13 RX ADMIN — IPRATROPIUM BROMIDE AND ALBUTEROL SULFATE 1 AMPULE: 2.5; .5 SOLUTION RESPIRATORY (INHALATION) at 07:18

## 2021-01-13 RX ADMIN — HYDROMORPHONE HYDROCHLORIDE 0.5 MG: 1 INJECTION, SOLUTION INTRAMUSCULAR; INTRAVENOUS; SUBCUTANEOUS at 10:00

## 2021-01-13 ASSESSMENT — PAIN SCALES - GENERAL: PAINLEVEL_OUTOF10: 6

## 2021-01-13 NOTE — TELEPHONE ENCOUNTER
Caller: Georgie ([facility])    Relationship to patient: Provider    Best call back number: [phone]    New or established patient?  [] New  [x] Established    Date of discharge: 01/13/2021    Facility discharged from:   [facility]    Diagnosis/Symptoms: ABDOMINAL PAIN    Length of stay (If applicable):  01/10/21    Specialty Only: Did you see a [facility] [place] [facility]?    [] Yes  [] No  If so, who?

## 2021-01-13 NOTE — DISCHARGE SUMMARY
Millicent Torrez  :  1965  MRN:  [id number]    Admit date:  1/10/2021  Discharge date:  2021    Admitting Physician:  No admitting provider for patient encounter. Advance Directive: Full Code    Consults: GI    Primary Care Physician:  Valery Juan    Discharge Diagnoses:  Principal Problem:    Dilation of common bile duct  Active Problems:    COPD (chronic obstructive pulmonary disease) (HCC)    Severe malnutrition (HCC)    Generalized anxiety disorder    Gastritis  Resolved Problems:    * No resolved hospital problems. *      Significant Diagnostic Studies:   Ct Abdomen Pelvis Wo Contrast Additional Contrast? None    Result Date: 1/10/2021  EXAMINATION: CT ABDOMEN PELVIS WO CONTRAST 1/10/2021 4:39 PM HISTORY: CT ABDOMEN AND PELVIS without contrast 1/10/2021 3:36 PM HISTORY: Abdominal pain COMPARISON: None DLP: 551 mGy cm Automated exposure control was utilized to minimize patient radiation dose. TECHNIQUE: Noncontrast enhanced images of the abdomen and pelvis obtained without oral contrast. FINDINGS: The lung bases and base of the heart are unremarkable. LIVER: No focal liver lesion. BILIARY SYSTEM: The gallbladder is visualized. The common duct is dilated. Stricture or obstruction at the level ampulla Vater may be present. The common duct is 9 mm.  PANCREAS: No focal pancreatic lesion. SPLEEN: Unremarkable. KIDNEYS AND ADRENALS: The adrenal glands are unremarkable. The renal contours are normal in size shape and position. .  The ureters are decompressed and normal in appearance. RETROPERITONEUM: No mass, lymphadenopathy or hemorrhage. GI TRACT: The bowel is not well evaluated in the absence of contrast. However moderate amount of stool is present throughout the colon. The appendix is visualized. . . OTHER: There is no mesenteric mass, lymphadenopathy or fluid collection The skeletal structures appear intact. Bharathi Fallow PELVIS: The muscle and fat planes in the pelvis appear symmetric. . The urinary bladder is normal in appearance. 1. The common duct is dilated to 9 mm. Stricture or obstruction at the level ampulla Vater cannot be excluded. 2. Moderate amount of stool is noted in the colon. . Signed by Dr Carlos Alberto Montoya on 1/10/2021 4:43 PM    Xr Chest Portable    Result Date: 1/10/2021  EXAMINATION: XR CHEST PORTABLE 1/10/2021 3:17 PM HISTORY: XR CHEST PORTABLE 1/10/2021 2:07 PM HISTORY: Short of air COMPARISON: None. FINDINGS: The lungs are clear. The cardiomediastinal silhouette and pulmonary vascularity are within normal limits. The osseous structures and surrounding soft tissues demonstrate no acute abnormality. 1. No radiographic evidence of acute cardiopulmonary process. Signed by Dr Carlos Alberto Montoya on 1/10/2021 3:18 PM      Pertinent Labs:   CBC:   Recent Labs     01/11/21 0333 01/12/21  0354 01/13/21  0425   WBC 5.9 6.7 12.3*   HGB 12.2 11.7* 12.3    218 232     BMP:    Recent Labs     01/11/21 0333 01/12/21  0354 01/13/21  0425    140 140   K 4.9 5.0 3.9    104 103   CO2 26 30* 32*   BUN 8 7 6   CREATININE 0.6 0.6 0.5   GLUCOSE 83 91 90     INR:   Recent Labs     01/10/21  1436   INR 0.92     ABGs:No results for input(s): PH, PO2, PCO2, HCO3, BE, O2SAT in the last 72 hours. Lactic Acid:  Recent Labs     01/11/21 0333   LACTA 0.8       Procedures: The patient underwent EGD with biopsy and endoscopic ultrasound on January 12, 2021. Hospital Course:   [copied from previous provider]  1/10/2021              The patient is a 50 y. o. female with PMH of COPD who presented to [facility] ED complaining of abdominal pain. Pt is sitting up in bed in no acute distress, although anxious. Her abdomen is mildly distended and is tender. She states she has been having abdominal pain for a while but over the last 4 days it has become more severe and she has been throwing up. She also admits to some shortness of breath. She has hx of COPD and admits to smoking about 10 cigarettes a day.  Pt denies chest pain, diarrhea, or any other complaints.               Work up in ED reveals VSS. Lactic acid at 2.2. D-dimer mild elevation but with SpO2 at 98% there is low suspicion for PE. GI panel normal. CT abdomen reveals dilation of common bile duct at 9mm. GI consulted from ED and planning MRCP in AM.   1/11/2021  Patient is complaining of abdominal pain is primarily when she is here. She is very anxious about getting the MRCP done because she is still phobic. We offered antianxiety medications but she is still concerned. GI discontinued the MRCP because if she moves a lot, the result would not be accurate. Patient will be kept n.p.o. after midnight and undergo ERCP and/or EUS tomorrow. 1/12/2021  Patient underwent EGD with EUS today, which she tolerated well. It failed to show any conclusive evidence of common bile duct dilatation secondary to stone. Common bile duct dilatation could be due to chronic opioid use. Patient will be monitored closely overnight and DC planning in a.m. if she remains stable. [end copied section]  1-13: Patient is feeling well without any further abdominal pain or nausea. Adolfo Bumpers for discharge to home and insistent that she only had one hour to get a ride home from her father. Discharged to home with GI clearance on low fat diet with instructions to avoid opiate use but the patient is on Suboxone and will have to remain on this.     Physical Exam:  Vitals: /69   Pulse 92   Temp 97.8 °F (36.6 °C) (Temporal)   Resp 17   Ht 5' 1\" (1.549 m)   Wt 102 lb 11.2 oz (46.6 kg)   SpO2 90%   BMI 19.40 kg/m²   24HR INTAKE/OUTPUT:      Intake/Output Summary (Last 24 hours) at 1/13/2021 1136  Last data filed at 1/13/2021 4838  Gross per 24 hour   Intake 2233.5 ml   Output --   Net 2233.5 ml     General appearance: alert and cooperative with exam  HEENT: atraumatic, eyes with clear conjunctiva and normal lids, pupils and irises normal, external ears and nose are normal, lips normal. Neck without masses, lympadenopathy, bruit, thyroid normal  Lungs: no increased work of breathing, clear to auscultation bilaterally without rales, rhonchi or wheezes  Heart: regular rate and rhythm, S1, S2 normal, no murmur, click, rub or gallop  Abdomen: soft, non-tender; bowel sounds normal; no masses,  no organomegaly  Extremities: extremities normal without clubbing, atraumatic, no cyanosis or edema  Neurologic: no focal neurologic deficits, normal sensation, alert and oriented, affect and mood appropriate  Skin: no jaundice, rashes, or nodules    Discharge Medications:       Maegan Walter   Home Medication Instructions EFD:[id number]    Printed on:01/13/21 3377   Medication Information                      clonazePAM (KLONOPIN) 0.5 MG tablet  Take 1 mg by mouth 2 times daily as needed. ondansetron (ZOFRAN-ODT) 4 MG disintegrating tablet  Take 1 tablet by mouth 3 times daily as needed for Nausea or Vomiting             pantoprazole (PROTONIX) 40 MG tablet  Take 1 tablet by mouth every morning (before breakfast)             zolpidem (AMBIEN) 10 MG tablet  Take 5-10 mg by mouth nightly. Discharge Instructions: Follow up with [facility] in 7 days. Take medications as directed. Resume activity as tolerated. Diet: DIET CLEAR LIQUID;     Disposition: Patient is medically stable and will be discharged Home. Time spent on discharge less than 30 minutes.     Signed:  Soni Lyn DO

## 2021-01-13 NOTE — PROGRESS NOTES
[facility], [facility], [place] [address]        DEPARTMENT OF HOSPITALIST MEDICINE        PROGRESS  NOTE:    NOTE: Portions of this note have been copied forward, however, changed to reflect the most current clinical status of this patient. Patient:  Carlos Nation  YOB: 1965  Date of Service: 1/12/2021  MRN: [id number]   Acct: [de-identified]   Primary Care Physician: Alyssa Neal  Advance Directive: Full Code  Admit Date: 1/10/2021       Hospital Day: 2      CHIEF COMPLAINT:  Chief Complaint   Patient presents with    Abdominal Pain     states abdominal pain and \"swelling\" states \"feels like someone is squeezing me\"       SUBJECTIVE:    I saw the patient during my morning rounds before she went for DVT US. She was a little nervous about the procedure. Abdominal pain is slowly improving. [facility]  COURSE:    1/12/2021  Patient underwent EGD with EUS today, which she tolerated well. It failed to show any conclusive evidence of common bile duct dilatation secondary to stone. Common bile duct dilatation could be due to chronic opioid use. Patient will be monitored closely overnight and DC planning in a.m. if she remains stable. 1/11/2021  Patient is complaining of abdominal pain is primarily when she is here. She is very anxious about getting the MRCP done because she is still phobic. We offered antianxiety medications but she is still concerned. GI discontinued the MRCP because if she moves a lot, the result would not be accurate. Patient will be kept n.p.o. after midnight and undergo ERCP and/or EUS tomorrow. 1/10/2021  History Of Present Illness: The patient is a 50 y.o. female with PMH of COPD who presented to [address] [place] [address] ED complaining of abdominal pain. Pt is sitting up in bed in no acute distress, although anxious. Her abdomen is mildly distended and is tender.  She states she has been having abdominal pain for a while but over the last 4 days it has become more severe and she has been throwing up. She also admits to some shortness of breath. She has hx of COPD and admits to smoking about 10 cigarettes a day. Pt denies chest pain, diarrhea, or any other complaints. Work up in ED reveals VSS. Lactic acid at 2.2. D-dimer mild elevation but with SpO2 at 98% there is low suspicion for PE. GI panel normal. CT abdomen reveals dilation of common bile duct at 9mm. GI consulted from ED and planning MRCP in AM.       REVIEW  OF  SYSTEMS:    Constitutional:  No fevers, chills, +nausea, vomiting, + tiredness and fatigue   Lungs:   No hemoptysis, pleurisy, cough, SOB   Heart:  No chest pressure with exertion, palpitations,    Abdomen:   No new masses, + abdominal pain    Extremities: + difficulty ambulating due to abdominal pain, no new lesions   Neurologic: No new motor or sensory changes       PAST MEDICAL HISTORY:  Past Medical History:   Diagnosis Date    COPD (chronic obstructive pulmonary disease) ([address]     Scoliosis          PAST SURGICAL HISTORY:  Past Surgical History:   Procedure Laterality Date    ENDOSCOPIC ULTRASOUND (LOWER) N/A 1/12/2021    ENDOSCOPIC ULTRASOUND performed by Mandi Moya MD at [facility]    ENDOSCOPIC ULTRASOUND (UPPER)  01/12/2021    Dr Adrian Topete-Common bile duct dilation of unclear etiology, questionably due to chronic opioid use, gastritis    UPPER GASTROINTESTINAL ENDOSCOPY  1/12/2021    EGD BIOPSY performed by Mandi Moya MD at [facility]        FAMILY HISTORY:  History reviewed. No pertinent family history. OBJECTIVE:  BP 96/60   Pulse 85   Temp 98.5 °F (36.9 °C) (Temporal)   Resp 14   Ht 5' 1\" (1.549 m)   Wt 102 lb 11.2 oz (46.6 kg)   SpO2 98%   BMI 19.40 kg/m²   I/O this shift: In: 200 [P.O.:240;  I.V.:307]  Out: -     PHYSICAL  EXAMINATION:    JONEL:  Awake, alert, oriented x 3, patient appears tired and fatigued   Head/Eyes:  Normocephalic, atraumatic, EOMI and PERRLA bilaterally   Respiratory: Bilateral decreased air entry in both lung fields, mild B/L crackles, symmetric expansion of chest   Cardiovascular:  Regular rate and rhythm, S1+S2+0, no murmurs/rubs   Abdomen:   Soft, + abdominal tenderness on palpation, bowel sounds +ve, no organomegaly   Extremities: Moves all, decreased range of motion, no edema   Neurologic: Awake, alert, oriented x 3, cranial nerves II-XII intact, no focal neurological deficits, sensory system intact   Psychiatric: + mild anxiety, non-suicidal       CURRENT MEDICATIONS:  Scheduled:   [START ON 1/13/2021] pantoprazole  40 mg Oral QAM AC    sodium chloride flush  10 mL Intravenous 2 times per day    [Held by provider] enoxaparin  40 mg Subcutaneous Daily    ipratropium-albuterol  1 ampule Inhalation Q4H WA    zolpidem  10 mg Oral Nightly        PRN:      LORazepam, 0.5 mg, Q8H PRN      sodium chloride flush, 10 mL, PRN      promethazine, 12.5 mg, Q6H PRN    Or      ondansetron, 4 mg, Q6H PRN      polyethylene glycol, 17 g, Daily PRN      acetaminophen, 650 mg, Q6H PRN    Or      acetaminophen, 650 mg, Q6H PRN      potassium chloride, 40 mEq, PRN    Or      potassium alternative oral replacement, 40 mEq, PRN    Or      potassium chloride, 10 mEq, PRN      magnesium sulfate, 2 g, PRN      HYDROmorphone, 0.5 mg, Q6H PRN        Infusions:   sodium chloride 75 mL/hr at 01/12/21 1226       Laboratory Data:  Recent Labs     01/10/21  1436 01/11/21  0333 01/12/21  0354   WBC 7.6 5.9 6.7   HGB 11.4* 12.2 11.7*    244 218     Recent Labs     01/10/21  1436 01/11/21  0333 01/12/21  0354    140 140   K 4.3 4.9 5.0    107 104   CO2 26 26 30*   BUN 11 8 7   CREATININE 0.7 0.6 0.6   GLUCOSE 107 83 91     Recent Labs     01/10/21  1436 01/11/21  0333 01/12/21  0354   AST 25 22 19   ALT 13 10 10   BILITOT <0.2 <0.2 <0.2   ALKPHOS 94 85 80     Troponin T:   Recent Labs     01/10/21  1436   TROPONINI <0.01     Pro-BNP: No results for input(s): BNP in the last 72 hours. INR:   Recent Labs     01/10/21  1436   INR 0.92     UA:  Recent Labs     01/10/21  1554   COLORU YELLOW   PHUR 5.0   CLARITYU Clear   SPECGRAV 1.014   LEUKOCYTESUR Negative   UROBILINOGEN 0.2   BILIRUBINUR Negative   BLOODU Negative   GLUCOSEU Negative     A1C: No results for input(s): LABA1C in the last 72 hours. ABG:No results for input(s): PHART, DGH0QKQ, PO2ART, AJB1ZRA, BEART, HGBAE, K5CMJKYH, CARBOXHGBART in the last 72 hours. Imaging:    Ct Abdomen Pelvis Wo Contrast Additional Contrast? None    Result Date: 1/10/2021  1. The common duct is dilated to 9 mm. Stricture or obstruction at the level ampulla Vater cannot be excluded. 2. Moderate amount of stool is noted in the colon. . Signed by Dr Charlie Valencia on 1/10/2021 4:43 PM    Xr Chest Portable    Result Date: 1/10/2021  1. No radiographic evidence of acute cardiopulmonary process. Signed by Dr Charlie Valencia on 1/10/2021 3:18 PM       ASSESSMENT & PLAN:    Principal Problem:    Dilation of common bile duct  Active Problems:    COPD (chronic obstructive pulmonary disease) (HCC)    Severe malnutrition (HCC)    Generalized anxiety disorder  Resolved Problems:    * No resolved hospital problems. *      Continue with current medications  Continue with management of pain  Patient is claustrophobic to MRI hence MRCP was held  Patient given Ativan 0.5 mg IV every 8 hours as needed for anxiety  Patient underwent EGD with EUS today which showed unclear etiology of common bile duct dilatation  Patient had gastritis which was biopsied  Continue with PPI daily and avoid NSAIDs as per GI  PT/OT evaluation ordered  DC planning home with home health care in a.m. if she remains stable management consult ordered for DC planning  Inpatient consult given for home health care arrangement for this patient    Repeat labs in a.m. Electrolyte replacement as per protocol. Patient will be monitored very closely on the floor.  Further recommendations as per the hospital course. The patient's management will be taken over by my covering Hospitalist, Dr. Long Desai, in a.m . .. I am signing off! Discharge Plan: DC patient home with home health care in a.m. if she remains stable, abdominal pain is improved and cleared by GI      Patient  is on DVT prophylaxis  Current medications reviewed  Lab work reviewed  Radiology/Chest x-ray films reviewed  Treatment recommendations from suspecialities reviewed, appreciated and agreed with  Discussed with the nurse and addressed all questions/concerns  Discussed with Patient and/or Family at the bedside in detail . .. they understand and agree with the management plan. Rin Egan MD  1/12/2021 8:45 PM      DISCLAIMER: This note was created with electronic voice recognition which does have occasional errors. If you have any questions regarding the content within the note please do not hesitate to contact me. .. Thanks.

## 2021-01-13 NOTE — DISCHARGE INSTR - DIET
Good nutrition is important when healing from an illness, injury, or surgery. Follow any nutrition recommendations given to you during your hospital stay. If you were given an oral nutrition supplement while in the hospital, continue to take this supplement at home. You can take it with meals, in-between meals, and/or before bedtime. These supplements can be purchased at most local grocery stores, pharmacies, and chain [employer]fen[employer]-stores. If you have any questions about your diet or nutrition, call the hospital and ask for the dietitian. No more than 10% of calories from fat sources.

## 2021-01-13 NOTE — CARE COORDINATION
Spoke with patient regarding MD orders for [facility] services. Patient agreeable and has chosen [facility]. Referral Faxed. [address] [place] [address] [phone]. [phone]. Please notify [address] [place] [address] when patient discharges and fax DC Summary,  DC med list and any new [facility] orders. The Patient  was provided with a choice of provider and agrees   with the discharge plan. [x] Yes [] No    Freedom of choice list was provided with basic dialogue that supports the patient's individualized plan of care/goals, treatment preferences and shares the quality data associated with the providers.  [x] Yes [] No  Electronically signed by Tami Katz on 1/13/2021 at 9:39 AM

## 2021-01-13 NOTE — PROGRESS NOTES
CLINICAL PHARMACY NOTE: MEDS TO [address] Select Patient?: No  Total # of Prescriptions Filled: 1   The following medications were delivered to the patient:  · Pantoprazole 40 mg  Total # of Interventions Completed: 0  Time Spent (min): 15    Additional Documentation:    Handed script to patient.

## 2021-01-14 ENCOUNTER — TELEPHONE (OUTPATIENT)
Dept: INTERNAL MEDICINE | Facility: CLINIC | Age: 56
End: 2021-01-14

## 2021-01-14 DIAGNOSIS — R11.2 NAUSEA AND VOMITING, INTRACTABILITY OF VOMITING NOT SPECIFIED, UNSPECIFIED VOMITING TYPE: Primary | ICD-10-CM

## 2021-01-14 DIAGNOSIS — F51.04 PSYCHOPHYSIOLOGICAL INSOMNIA: ICD-10-CM

## 2021-01-14 DIAGNOSIS — J41.0 SIMPLE CHRONIC BRONCHITIS (HCC): ICD-10-CM

## 2021-01-14 DIAGNOSIS — F41.1 GENERALIZED ANXIETY DISORDER: ICD-10-CM

## 2021-01-14 RX ORDER — TIOTROPIUM BROMIDE INHALATION SPRAY 3.12 UG/1
2 SPRAY, METERED RESPIRATORY (INHALATION)
Qty: 4 G | Refills: 5 | Status: SHIPPED | OUTPATIENT
Start: 2021-01-14 | End: 2021-05-17

## 2021-01-14 RX ORDER — ONDANSETRON 4 MG/1
4 TABLET, FILM COATED ORAL EVERY 4 HOURS PRN
Qty: 30 TABLET | Refills: 3 | Status: SHIPPED | OUTPATIENT
Start: 2021-01-14 | End: 2021-05-17

## 2021-01-14 RX ORDER — CLONAZEPAM 1 MG/1
.5-1 TABLET ORAL NIGHTLY PRN
Qty: 20 TABLET | Refills: 0 | OUTPATIENT
Start: 2021-01-14 | End: 2021-02-13

## 2021-01-14 RX ORDER — ZOLPIDEM TARTRATE 10 MG/1
5-10 TABLET ORAL NIGHTLY
Qty: 25 TABLET | Refills: 2 | OUTPATIENT
Start: 2021-01-14

## 2021-01-14 NOTE — TELEPHONE ENCOUNTER
PATIENT DOES NOT HAVE ALL OF HER MEDICATIONS WITH HER.  SHE WAS BROUGHT HOME BY A FAMILY MEMBER AND LEFT HER BAG OF MEDICATIONS IN THE CAR.  ANOTHER FAMILY MEMBER USED THAT CAR TO GO OUT OF TOWN.    MONICA HUDSON DID STATE THAT MRS MEDINA WAS ALL OVER THE HOUSE, WENT OUT THE DOOR, DOWN 5 STEPS TO GO TO HER BROTHERS HOUSE 2 HOUSES AWAY AND THEN CAME BACK UP THOSE STEPS WITH OUT HOLDING EITHER TIME.  VERY MOBILE.

## 2021-01-14 NOTE — TELEPHONE ENCOUNTER
I do not see a referral to PT or to home care from our office but she was recently at [facility]. She has an appointment with Dr. Griffin next week so if she has a need to therapy that can be decided then.

## 2021-01-14 NOTE — TELEPHONE ENCOUNTER
Does she need new scripts for any of her medications left in the car? Or does she wish to address that at her next visit.

## 2021-01-14 NOTE — TELEPHONE ENCOUNTER
Zofran has been sent. Can her controlled medications be mailed to her? These are not due for refill.

## 2021-01-14 NOTE — TELEPHONE ENCOUNTER
Lainey zapien calling from [facility]    She went to do her PT  She is not home bound so they cant admit to home care    FYI    [phone]

## 2021-01-14 NOTE — TELEPHONE ENCOUNTER
ATTEMPTED TO CALL MONICA HUDSON WITH [facility],  HAS BEEN LEFT FOR RETURN CALL.

## 2021-01-22 ENCOUNTER — OFFICE VISIT (OUTPATIENT)
Dept: GASTROENTEROLOGY | Age: 56
End: 2021-01-22
Payer: MEDICAID

## 2021-01-22 VITALS
WEIGHT: 89 LBS | DIASTOLIC BLOOD PRESSURE: 60 MMHG | HEIGHT: 61 IN | OXYGEN SATURATION: 98 % | BODY MASS INDEX: 16.8 KG/M2 | HEART RATE: 72 BPM | SYSTOLIC BLOOD PRESSURE: 115 MMHG

## 2021-01-22 DIAGNOSIS — K83.8 DILATION OF COMMON BILE DUCT: ICD-10-CM

## 2021-01-22 DIAGNOSIS — R10.11 RUQ PAIN: Primary | ICD-10-CM

## 2021-01-22 DIAGNOSIS — K29.50 CHRONIC GASTRITIS WITHOUT BLEEDING, UNSPECIFIED GASTRITIS TYPE: ICD-10-CM

## 2021-01-22 DIAGNOSIS — R11.0 NAUSEA: ICD-10-CM

## 2021-01-22 PROCEDURE — 99214 OFFICE O/P EST MOD 30 MIN: CPT | Performed by: NURSE PRACTITIONER

## 2021-01-22 RX ORDER — PANTOPRAZOLE SODIUM 40 MG/1
40 TABLET, DELAYED RELEASE ORAL
Qty: 30 TABLET | Refills: 2 | Status: SHIPPED | OUTPATIENT
Start: 2021-01-22

## 2021-01-22 RX ORDER — SUCRALFATE 1 G/1
1 TABLET ORAL 4 TIMES DAILY
Qty: 120 TABLET | Refills: 1 | Status: SHIPPED | OUTPATIENT
Start: 2021-01-22 | End: 2021-05-20

## 2021-01-22 RX ORDER — BUPRENORPHINE HYDROCHLORIDE AND NALOXONE HYDROCHLORIDE DIHYDRATE 8; 2 MG/1; MG/1
TABLET SUBLINGUAL
COMMUNITY
Start: 2020-12-31

## 2021-01-22 ASSESSMENT — ENCOUNTER SYMPTOMS
ABDOMINAL DISTENTION: 0
SHORTNESS OF BREATH: 1
ANAL BLEEDING: 0
CONSTIPATION: 0
RECTAL PAIN: 0
CHOKING: 0
ABDOMINAL PAIN: 1
BLOOD IN STOOL: 0
VOMITING: 0
DIARRHEA: 0
COUGH: 0
TROUBLE SWALLOWING: 0
NAUSEA: 1

## 2021-01-22 NOTE — PATIENT INSTRUCTIONS
Schedule colonoscopy. No aspirin, ibuprofen, naproxen, fish oil or vitamin E for 5 days before procedure. Do not eat or drink after midnight the day of the procedure. Allowed medications can be taken with a small sip of water. Please review your prep instructions for allowed medications. You will not be able to drive for 24 hours after the procedure due to sedation. You must have a responsible adult, 25 year or older, to accompany you and drive you home the day of your procedure. If you are on blood thinners, clearance from the prescribing physician will be obtained before your procedure is scheduled. If it is determined it is not safe to hold these medications for a short time an alternative procedure for evaluation may be recommended. Risks of colonoscopy include, but are not limited to, perforation, bleeding, and infection, Risk of perforation and bleeding are increased if there is a polyp removed. Anesthesia risks will be reviewed with you before the procedure by a member of the anesthesia department. Your physician may also schedule a follow up appointment with the nurse practitioner to discuss pathology, symptoms or to check if you have had any problems related to your procedure. If you prefer not to return to the office after your procedure please discuss this with your physician on the day of your colonoscopy. The physician will talk with you and/or your family after the procedure is completed. Final recommendations are based on the pathologist report if biopsies or specimens are taken. If polyps are removed during the procedure they will be sent to a pathologist for analysis. Unless you have a follow up appointment scheduled, you will be notified by mail of the pathology results within 4 weeks. If you have not received results after 4 weeks you may call the office to obtain this information.        For Colonoscopy: You will be given specific directions regarding restrictions to diet and bowel prep instructions including laxatives. Please read these instructions one week prior to your scheduled procedure to ensure that you are prepared. If you have any questions regarding these instructions please call our office Mon through Fri from 8:00 am to 4:00 pm.     Follow prep instructions provided for bowel prep. Take all of the bowel prep as directed. If you are having problems with nausea, stop your prep for 30-45 min to allow the nausea to subside before resuming your prep. It is important to drink plenty of fluids throughout the day before taking your laxatives. This will help to protect your kidneys, prevent dehydration and maximize the effect of the bowel prep. Your diet before a colonoscopy bowel preparation is very important to ensure a successful colon exam. It is recommended to consider certain changes to your diet three to four days prior to the procedure. Remember that your bowels need to be completely empty for the exam.    What foods are good to eat? Cut down on heavy solid foods three to four days before the procedure and start introducing lighter meals to your diet. The following food suggestions are a good part of your diet before a colonoscopy bowel preparation. ? Light meat that is easily digestible such as chicken (without the skin)   ? Potatoes without skin   ? Cheese   ? Eggs   ? A light meal of steamed white fish   ? Light clear soups    Foods and drinks to avoid  Avoid foods that contain too much fiber. Stay clear of dark colored beverages. They can stick to the walls of the digestive tract and make it difficult to differentiate from blood. Some of these foods are:  ? Red meat, rice, nuts and vegetables   ? Milk, other milk based fluids and cream   ? Most fruit and puddings   ? Whole grain pasta   ? Cereals, bran and seeds   ?  Colored beverages, especially those that are red or purple in color ? Red colored Jell-O     On the day before the colonoscopy, continue to drink plenty of clear fluids. It is important   to keep yourself hydrated before the exam.     Please follow all instructions as provided for cleansing the bowel. Failure to have an adequately prepped colon may cause you to have incomplete exam with further testing required. [email or web address]            The body produces gas as a normal physical process. However, some individuals may experience discomfort or embarrassment because of \"too much gas. \"  Complaints often take one of three forms:  excessive belching, bloating or passage of gas. If you have trouble with \"too much gas,\" try these tips:    Eat less foods that give you intestinal gas  Foods that produce gas for one person may not produce gas for another person. Try to identify which foods as gas-forming for you.   The following is a list of foods that can be gas-forming    Legumes and certain vegetables such as:    - Baked beans     - Dried beans     - Radishes  - Broccoli     - Dried peas     - Rutabagas  - [place] sprouts    - Leeks, scallions, shallots   - Sauerkraut  - Cabbage     - Lentils     - Soy products  - Cauliflower     - Lima beans     - Turnips  - Corn      - Onions  - Cucumbers     - Peppers    Excessive amounts of certain fruits or fruit juices such as:    - Apple juice     - Cantaloupe     - Prune juice  - Apples     - Grape juice     - Prunes  - Avocado     - Honeydew melon    - Raisins  - Bananas     - Pear juice    Milk and other dairy products if lactose intolerant including:    - Milks (including evaporated   - Sherbets    and condensed)    - Yogurts  - Creams (including light,    - Some cheese (cottage cheese, etc)     whipping, and sour)    - Wykoff  - Ice creams Being lactose intolerant means you can't digest lactose - the natural sugar found in milk and other dairy products. People who cannot digest lactose do not have enough of an enzyme called lactase. When people do not have enough lactase to digest lactose in the foods they eat or drink, they may have gas, stomach cramps, bloating, nausea, or diarrhea. If you are lactose intolerant, eat non-dairy sources of calcium (such as soy milk, kale, or bok da), use lactose-free milk or dairy products, or talk to your health care provider about taking a lactase supplement, such as Lactaid. You may need to take a calcium supplement to keep your bones healthy if you are not getting enough calcium in your diet. High fat foods such as:    - Fatty meats  - Fried foods  - Rich pastries  - Rich sauces and gravies    Bran or a drastic increase in fiber intake    If you increase fiber in your diet, do it slowly. This can help limit discomfort. Be sure to drink plenty of water too. Artificial sweeteners sorbitol and mannitol    These sweeteners are found in may sugar-free candies and gum. They are also found in some medicines. Other artificial sweeteners such as aspartame and saccharin do not have this side effect. Reduce air swallowing  Swallowing air is the primary cause of belching and may contribute to bloating.  - Avoid frequent, repetitive swallowing, which may be cause by ill-fitting dentures, chewing gum or tobacco, sucking hard candy or frequent sipping of beverages. - Avoid eating rapidly and gulping food and beverages  - Avoid talking while eating or chewing  - Eat and chew with your mouth closed  - Avoid drawing on straws, narrow mouthed bottles, cigars, cigarettes, and pipes.   - Avoid foods that contain air, such as carbonated beverages, beer, and whipped foods    Try other measures to relieve or prevent gas  - Remain in an upright position after eating  - Exercise regularly  - Reduce stress - Eat meals slowly  - Don't wear tight fitting clothes  - Lose weight if overweight    [facility], 2018

## 2021-01-22 NOTE — PROGRESS NOTES
Subjective:     Patient ID: Kayden Trujillo is a 50 y.o. female  PCP: Criss Douglas  Referring Provider: No ref. provider found    HPI  Patient presents to the office today with the following complaints: New Patient and Abdominal Pain      Pt being seen today for follow up after EGD/EUS with Dr. Jie Rodriguez for dilated CBD at 9 mm seen on CT with normal LFTs. She presented to ER on 1/10/2021 for increasing abdominal pain. Today, she reports continued abdominal pain and chest pain. She reports Hx MI, \"they checked my heart when I was in the hospital.\"  She reports heartburn and nausea. She is using Zofran for the nausea. [place] think it's my gallbladder. \"  She is taking Protonix 40 mg po daily. She states she was scheduled for colonoscopy, however, she missed this due to being in hospital.  She denies any blood in stool, rectal bleeding, or changes in bowel habits. Lab Results   Component Value Date     01/13/2021    K 3.9 01/13/2021     01/13/2021    CO2 32 (H) 01/13/2021    BUN 6 01/13/2021    CREATININE 0.5 01/13/2021    GLUCOSE 90 01/13/2021    CALCIUM 8.8 01/13/2021    PROT 6.5 (L) 01/13/2021    LABALBU 3.7 01/13/2021    BILITOT 0.4 01/13/2021    ALKPHOS 91 01/13/2021    AST 18 01/13/2021    ALT 9 01/13/2021    LABGLOM >60 01/13/2021    GFRAA >59 01/13/2021       EGD Findings 1/12/2021:   Endoscopic Finding:   - esophagus appeared normal  - there were noted mucosal changes of gastritis seen - biopsied for H.Pylori  - duodenum appeared normal. No ampullary mass seen. Endosonographic Findings:  - The celiac axis and associated vascular structures was identified and examined. No concerning or malignant lymphadenopathy was identified.   - Limited views of the left lobe of the liver revealed no obvious IHD dilation. No focal hepatic mass seen. - The EUS scope was advanced to the duodenal bulb. The CBD was visualized and followed to the level of the ampulla. There was noted CBD dilation to 9.3mm maximum. No focal stricture or mass was seen. No filling defects seen. - Pancreas: the MPD was visualized and appeared non-dilated. No focal pancreatic mass was seen. Estimated Blood Loss: minimal  IMPRESSION:  - Gastritis - biopsied  - Common bile duct dilation of unclear etiology - questionably due to chronic opioid use? RECOMMENDATIONS:  - PPI daily  - Avoid NSAIDs  - Avoid and Minimize opioid use. - Diet as tolerated. Assessment:     1. RUQ pain    2. Nausea    3. Dilation of common bile duct    4. Chronic gastritis without bleeding, unspecified gastritis type       Plan:   - Continue Protonix 40 mg po daily  - Trial of Carafate 1 gm po QID  - US Gallbladder  - Call with any questions or concerns  - Schedule colonoscopy  Instruct on bowel prep. Nothing to eat or drink after midnight the day of the exam.  Unable to drive for 24 hours after the procedure. No aspirin or nonsteroidal anti-inflammatories for 5 days before procedure. I have discussed the benefits, alternatives, and risks (including bleeding, perforation and death)  for pursuing Endoscopy (EGD/Colonscopy/EUS/ERCP) with the patient and they are willing to continue. We also discussed the need for anesthesia, IV access, proper dietary changes, medication changes if necessary, and need for bowel prep (if ordered) prior to their Endoscopic procedure. They are aware they must have someone accompany them to their scheduled procedure to drive them home - they agree to the above and are willing to continue.        Orders  Orders Placed This Encounter   Procedures    US GALLBLADDER RUQ     Standing Status:   Future     Standing Expiration Date:   1/22/2022     Order Specific Question:   Reason for exam:     Answer:   RUQ pain     Medications  Orders Placed This Encounter   Medications  pantoprazole (PROTONIX) 40 MG tablet     Sig: Take 1 tablet by mouth every morning (before breakfast)     Dispense:  30 tablet     Refill:  2    sucralfate (CARAFATE) 1 GM tablet     Sig: Take 1 tablet by mouth 4 times daily     Dispense:  120 tablet     Refill:  1         Patient History:     Past Medical History:   Diagnosis Date    COPD (chronic obstructive pulmonary disease) ([address]     Scoliosis        Past Surgical History:   Procedure Laterality Date    ENDOSCOPIC ULTRASOUND (LOWER) N/A 01/12/2021    Dr Jami Topete-Common bile duct dilation of unclear etiology, questionably due to chronic opioid use, gastritis    UPPER GASTROINTESTINAL ENDOSCOPY  01/12/2021    Dr Jami Topete-w/EUS       Family History   Problem Relation Age of Onset    Colon Cancer Neg Hx     Colon Polyps Neg Hx        Social History     Socioeconomic History    Marital status:      Spouse name: None    Number of children: None    Years of education: None    Highest education level: None   Occupational History    None   Social Needs    Financial resource strain: None    Food insecurity     Worry: None     Inability: None    Transportation needs     Medical: None     Non-medical: None   Tobacco Use    Smoking status: Current Every Day Smoker     Packs/day: 0.50     Types: Cigarettes    Smokeless tobacco: Never Used   Substance and Sexual Activity    Alcohol use: Never     Frequency: Never    Drug use: Never    Sexual activity: None   Lifestyle    Physical activity     Days per week: None     Minutes per session: None    Stress: None   Relationships    Social connections     Talks on phone: None     Gets together: None     Attends Restorationism service: None     Active member of club or organization: None     Attends meetings of clubs or organizations: None     Relationship status: None    Intimate partner violence     Fear of current or ex partner: None     Emotionally abused: None     Physically abused: None Forced sexual activity: None   Other Topics Concern    None   Social History Narrative    None       Current Outpatient Medications   Medication Sig Dispense Refill    buprenorphine-naloxone (SUBOXONE) 8-2 MG SUBL SL tablet DISSOLVE 1 AND 1/2 TABLET UNDER THE TONGUE ONCE DAILY FOR 5 DAYS      pantoprazole (PROTONIX) 40 MG tablet Take 1 tablet by mouth every morning (before breakfast) 30 tablet 2    sucralfate (CARAFATE) 1 GM tablet Take 1 tablet by mouth 4 times daily 120 tablet 1    zolpidem (AMBIEN) 10 MG tablet Take 5-10 mg by mouth nightly.  clonazePAM (KLONOPIN) 0.5 MG tablet Take 1 mg by mouth 2 times daily as needed.  ondansetron (ZOFRAN-ODT) 4 MG disintegrating tablet Take 1 tablet by mouth 3 times daily as needed for Nausea or Vomiting 21 tablet 0     No current facility-administered medications for this visit. Allergies   Allergen Reactions    Aspirin Hives    Ibuprofen     Iodine     Sulfa Antibiotics        Review of Systems   Constitutional: Positive for appetite change (loss). Negative for activity change, fatigue, fever and unexpected weight change. HENT: Negative for trouble swallowing. Respiratory: Positive for shortness of breath. Negative for cough and choking. Cardiovascular: Negative for chest pain. Gastrointestinal: Positive for abdominal pain and nausea. Negative for abdominal distention, anal bleeding, blood in stool, constipation, diarrhea, rectal pain and vomiting. Reflux     Allergic/Immunologic: Negative for food allergies. All other systems reviewed and are negative. Objective:     /60   Pulse 72   Ht 5' 1\" (1.549 m)   Wt 89 lb (40.4 kg)   SpO2 98%   BMI 16.82 kg/m²     Physical Exam  Vitals signs reviewed. Constitutional:       General: She is not in acute distress. Appearance: She is well-developed and underweight. HENT:      Head: Normocephalic and atraumatic.       Right Ear: External ear normal. Left Ear: External ear normal.      Nose: Nose normal.      Comments: Mask on     Mouth/Throat:      Comments: Mask on  Eyes:      General: No scleral icterus. Right eye: No discharge. Left eye: No discharge. Conjunctiva/sclera: Conjunctivae normal.      Pupils: Pupils are equal, round, and reactive to light. Neck:      Musculoskeletal: Normal range of motion and neck supple. Cardiovascular:      Rate and Rhythm: Normal rate and regular rhythm. Heart sounds: Normal heart sounds. No murmur. Pulmonary:      Effort: Pulmonary effort is normal. No respiratory distress. Breath sounds: Normal breath sounds. No wheezing or rales. Abdominal:      General: Bowel sounds are normal. There is no distension. Palpations: Abdomen is soft. There is no mass. Tenderness: There is no abdominal tenderness. There is no guarding or rebound. Musculoskeletal: Normal range of motion. Skin:     General: Skin is warm and dry. Coloration: Skin is not pale. Neurological:      Mental Status: She is alert and oriented to person, place, and time.    Psychiatric:         Behavior: Behavior normal.

## 2021-02-01 DIAGNOSIS — F41.1 GENERALIZED ANXIETY DISORDER: ICD-10-CM

## 2021-02-01 DIAGNOSIS — F51.04 PSYCHOPHYSIOLOGICAL INSOMNIA: ICD-10-CM

## 2021-02-01 RX ORDER — CLONAZEPAM 1 MG/1
.5-1 TABLET ORAL NIGHTLY PRN
Qty: 20 TABLET | Refills: 0 | Status: SHIPPED | OUTPATIENT
Start: 2021-02-01 | End: 2021-04-30 | Stop reason: SDUPTHER

## 2021-02-02 ENCOUNTER — TRANSCRIBE ORDERS (OUTPATIENT)
Dept: ADMINISTRATIVE | Facility: HOSPITAL | Age: 56
End: 2021-02-02

## 2021-02-08 ENCOUNTER — OFFICE VISIT (OUTPATIENT)
Age: 56
End: 2021-02-08

## 2021-02-08 ENCOUNTER — TELEPHONE (OUTPATIENT)
Dept: INTERNAL MEDICINE | Facility: CLINIC | Age: 56
End: 2021-02-08

## 2021-02-08 VITALS — HEART RATE: 91 BPM | OXYGEN SATURATION: 96 % | TEMPERATURE: 96.8 F

## 2021-02-08 DIAGNOSIS — Z11.59 SCREENING FOR VIRAL DISEASE: Primary | ICD-10-CM

## 2021-02-08 LAB — SARS-COV-2, PCR: NOT DETECTED

## 2021-02-08 PROCEDURE — 99999 PR OFFICE/OUTPT VISIT,PROCEDURE ONLY: CPT | Performed by: NURSE PRACTITIONER

## 2021-02-08 NOTE — TELEPHONE ENCOUNTER
Received a call from Kateryna with covid testing, she was unable to reach patient for the covid testing before her PFT today.

## 2021-02-11 ENCOUNTER — ANESTHESIA (OUTPATIENT)
Dept: OPERATING ROOM | Age: 56
End: 2021-02-11

## 2021-02-11 ENCOUNTER — APPOINTMENT (OUTPATIENT)
Dept: OPERATING ROOM | Age: 56
End: 2021-02-11

## 2021-02-11 ENCOUNTER — HOSPITAL ENCOUNTER (OUTPATIENT)
Age: 56
Setting detail: OUTPATIENT SURGERY
Discharge: HOME | End: 2021-02-11
Attending: INTERNAL MEDICINE | Admitting: INTERNAL MEDICINE
Payer: MEDICAID

## 2021-02-11 ENCOUNTER — ANESTHESIA EVENT (OUTPATIENT)
Dept: OPERATING ROOM | Age: 56
End: 2021-02-11

## 2021-02-11 VITALS
OXYGEN SATURATION: 98 % | BODY MASS INDEX: 18.12 KG/M2 | TEMPERATURE: 98 F | SYSTOLIC BLOOD PRESSURE: 94 MMHG | RESPIRATION RATE: 16 BRPM | HEIGHT: 61 IN | WEIGHT: 96 LBS | HEART RATE: 61 BPM | DIASTOLIC BLOOD PRESSURE: 61 MMHG

## 2021-02-11 VITALS — SYSTOLIC BLOOD PRESSURE: 91 MMHG | DIASTOLIC BLOOD PRESSURE: 66 MMHG | OXYGEN SATURATION: 95 %

## 2021-02-11 PROCEDURE — 45378 DIAGNOSTIC COLONOSCOPY: CPT | Performed by: INTERNAL MEDICINE

## 2021-02-11 PROCEDURE — G0121 COLON CA SCRN NOT HI RSK IND: HCPCS

## 2021-02-11 RX ORDER — PROPOFOL 10 MG/ML
INJECTION, EMULSION INTRAVENOUS PRN
Status: DISCONTINUED | OUTPATIENT
Start: 2021-02-11 | End: 2021-02-11 | Stop reason: SDUPTHER

## 2021-02-11 RX ORDER — SODIUM CHLORIDE 9 MG/ML
INJECTION, SOLUTION INTRAVENOUS CONTINUOUS
Status: DISCONTINUED | OUTPATIENT
Start: 2021-02-11 | End: 2021-02-11 | Stop reason: HOSPADM

## 2021-02-11 RX ORDER — LIDOCAINE HYDROCHLORIDE 10 MG/ML
INJECTION, SOLUTION INFILTRATION; PERINEURAL PRN
Status: DISCONTINUED | OUTPATIENT
Start: 2021-02-11 | End: 2021-02-11 | Stop reason: SDUPTHER

## 2021-02-11 RX ADMIN — PROPOFOL 80 MG: 10 INJECTION, EMULSION INTRAVENOUS at 10:44

## 2021-02-11 RX ADMIN — LIDOCAINE HYDROCHLORIDE 40 MG: 10 INJECTION, SOLUTION INFILTRATION; PERINEURAL at 10:44

## 2021-02-11 RX ADMIN — SODIUM CHLORIDE: 9 INJECTION, SOLUTION INTRAVENOUS at 09:45

## 2021-02-11 ASSESSMENT — COPD QUESTIONNAIRES: CAT_SEVERITY: SEVERE

## 2021-02-11 ASSESSMENT — LIFESTYLE VARIABLES: SMOKING_STATUS: 1

## 2021-02-11 NOTE — H&P
Patient Name: Millicent Torrez  : 1965  MRN: [id number]  DATE: 21    Allergies: Allergies   Allergen Reactions    Aspirin Hives    Ibuprofen     Iodine     Sulfa Antibiotics         ENDOSCOPY  History and Physical    Procedure:    [] Diagnostic Colonoscopy       [x] Screening Colonoscopy  [] EGD      [] ERCP      [] EUS       [] Other    [x] Previous office notes/History and Physical reviewed from the patients chart. Please see EMR for further details of HPI. I have examined the patient's status immediately prior to the procedure and:      Indications/HPI:    []Abdominal Pain   []Cancer- GI/Lung     []Fhx of colon CA/polyps  []History of Polyps  []Barretts            []Melena  []Abnormal Imaging              []Dysphagia              []Persistent Pneumonia   []Anemia                            []Food Impaction        []History of Polyps  [] GI Bleed             []Pulmonary nodule/Mass   []Change in bowel habits []Heartburn/Reflux  []Rectal Bleed (BRBPR)  []Chest Pain - Non Cardiac []Heme (+) Stool []Ulcers  []Constipation  []Hemoptysis  []Varices  []Diarrhea  []Hypoxemia    []Nausea/Vomiting   [x]Screening   []Crohns/Colitis  []Other:     Anesthesia:   [x] MAC [] Moderate Sedation   [] General   [] None     ROS: 12 pt Review of Symptoms was negative unless mentioned above    Medications:   Prior to Admission medications    Medication Sig Start Date End Date Taking?  Authorizing Provider   ALBUTEROL IN Inhale into the lungs   Yes Historical Provider, MD   tiotropium (SPIRIVA) 18 MCG inhalation capsule Inhale 18 mcg into the lungs daily   Yes Historical Provider, MD   buprenorphine-naloxone (SUBOXONE) 8-2 MG SUBL SL tablet DISSOLVE 1 AND 1/2 TABLET UNDER THE TONGUE ONCE DAILY FOR 5 DAYS 20  Yes Historical Provider, MD   pantoprazole (PROTONIX) 40 MG tablet Take 1 tablet by mouth every morning (before breakfast) 21  Yes PERFECTO Dickson NP sucralfate (CARAFATE) 1 GM tablet Take 1 tablet by mouth 4 times daily 1/22/21  Yes PERFECTO Acosta NP   zolpidem (AMBIEN) 10 MG tablet Take 5-10 mg by mouth nightly. 1/7/21  Yes Historical Provider, MD   ondansetron (ZOFRAN-ODT) 4 MG disintegrating tablet Take 1 tablet by mouth 3 times daily as needed for Nausea or Vomiting 12/27/19  Yes PERFECTO Yuen Do - NP   clonazePAM (KLONOPIN) 0.5 MG tablet Take 1 mg by mouth 2 times daily as needed. Historical Provider, MD       Past Medical History:  Past Medical History:   Diagnosis Date    COPD (chronic obstructive pulmonary disease) ([facility] [address]     GERD (gastroesophageal reflux disease)     History of MI (myocardial infarction) 2003    History of seizure     years ago    Scoliosis        Past Surgical History:  Past Surgical History:   Procedure Laterality Date    ENDOSCOPIC ULTRASOUND (LOWER) N/A 01/12/2021    Dr Bayron Topete-Common bile duct dilation of unclear etiology, questionably due to chronic opioid use, gastritis    TONSILLECTOMY      UPPER GASTROINTESTINAL ENDOSCOPY  01/12/2021    Dr Bayron Topete-w/EUS       Social History:  Social History     Tobacco Use    Smoking status: Current Every Day Smoker     Packs/day: 0.50     Types: Cigarettes    Smokeless tobacco: Never Used   Substance Use Topics    Alcohol use: Never     Frequency: Never    Drug use: Never       Vital Signs:   Vitals:    02/11/21 0940   BP: (!) 91/55   Pulse: 83   Resp: 18   Temp: 98 °F (36.7 °C)   SpO2: 94%        Physical Exam:  Cardiac:  [x]WNL  []Comments:  Pulmonary:  [x]WNL   []Comments:  Neuro/Mental Status:  [x]WNL  []Comments:  Abdominal:  [x]WNL    []Comments:  Other:   []WNL  []Comments:    Informed Consent:  The risks and benefits of the procedure have been discussed with either the patient or if they cannot consent, their representative. Assessment:  Patient examined and appropriate for planned sedation and procedure.      Plan: Proceed with planned sedation and procedure as above.          Romain Rosado MD

## 2021-02-11 NOTE — ANESTHESIA POSTPROCEDURE EVALUATION
Department of Anesthesiology  Postprocedure Note    Patient: Negro Whipple  MRN: [id number]  [place]: 1965  Date of evaluation: 2/11/2021  Time:  10:56 AM     Procedure Summary     Date: 02/11/21 Room / Location: [facility] ENDO 02 / [address] [place] [address]    Anesthesia Start: 1039 Anesthesia Stop:     Procedure: COLORECTAL CANCER SCREENING, NOT HIGH RISK (N/A Abdomen) Diagnosis: (SCREEN)    Surgeons: Asha Booker MD Responsible Provider: PERFECTO Katz CRNA    Anesthesia Type: general, TIVA ASA Status: 2          Anesthesia Type: general, TIVA    Sandra Phase I:      Sandra Phase II: Sandra Score: 10    Last vitals: Reviewed and per EMR flowsheets.        Anesthesia Post Evaluation    Patient location during evaluation: bedside  Patient participation: complete - patient participated  Level of consciousness: sleepy but conscious  Pain score: 0  Airway patency: patent  Nausea & Vomiting: no nausea and no vomiting  Complications: no  Cardiovascular status: hemodynamically stable and blood pressure returned to baseline  Respiratory status: acceptable and nasal cannula  Hydration status: stable

## 2021-02-11 NOTE — ANESTHESIA PRE PROCEDURE
Department of Anesthesiology  Preprocedure Note       Name:  Millicent Torrez   Age:  50 y.o.  :  1965                                          MRN:  [id number]         Date:  2021      Surgeon: Price Wilson):  Mariely Soto MD    Procedure: Procedure(s):  COLORECTAL CANCER SCREENING, NOT HIGH RISK    Medications prior to admission:   Prior to Admission medications    Medication Sig Start Date End Date Taking? Authorizing Provider   buprenorphine-naloxone (SUBOXONE) 8-2 MG SUBL SL tablet DISSOLVE 1 AND 1/2 TABLET UNDER THE TONGUE ONCE DAILY FOR 5 DAYS 20   Historical Provider, MD   pantoprazole (PROTONIX) 40 MG tablet Take 1 tablet by mouth every morning (before breakfast) 21   PERFECTO Dickson NP   sucralfate (CARAFATE) 1 GM tablet Take 1 tablet by mouth 4 times daily 21   PERFECTO Dickson NP   zolpidem (AMBIEN) 10 MG tablet Take 5-10 mg by mouth nightly. 21   Historical Provider, MD   clonazePAM (KLONOPIN) 0.5 MG tablet Take 1 mg by mouth 2 times daily as needed. Historical Provider, MD   ondansetron (ZOFRAN-ODT) 4 MG disintegrating tablet Take 1 tablet by mouth 3 times daily as needed for Nausea or Vomiting 19   PERFECTO Armando NP       Current medications:    No current facility-administered medications for this visit. No current outpatient medications on file. Facility-Administered Medications Ordered in Other Visits   Medication Dose Route Frequency Provider Last Rate Last Admin    0.9 % sodium chloride infusion   Intravenous Continuous Mariely Soto MD           Allergies:     Allergies   Allergen Reactions    Aspirin Hives    Ibuprofen     Iodine     Sulfa Antibiotics        Problem List:    Patient Active Problem List   Diagnosis Code    Dilation of common bile duct K83.8    COPD (chronic obstructive pulmonary disease) ([facility]) J44.9    Severe malnutrition ([facility]) E43    Generalized anxiety disorder F41.1    Gastritis K29.70 Past Medical History:        Diagnosis Date    COPD (chronic obstructive pulmonary disease) ([facility] [address]     Scoliosis        Past Surgical History:        Procedure Laterality Date    ENDOSCOPIC ULTRASOUND (LOWER) N/A 01/12/2021    Dr Veronica Topete-Common bile duct dilation of unclear etiology, questionably due to chronic opioid use, gastritis    UPPER GASTROINTESTINAL ENDOSCOPY  01/12/2021    Dr Veronica Topete-w/EUS       Social History:    Social History     Tobacco Use    Smoking status: Current Every Day Smoker     Packs/day: 0.50     Types: Cigarettes    Smokeless tobacco: Never Used   Substance Use Topics    Alcohol use: Never     Frequency: Never                                Ready to quit: Not Answered  Counseling given: Not Answered      Vital Signs (Current): There were no vitals filed for this visit.                                            BP Readings from Last 3 Encounters:   01/22/21 115/60   01/13/21 111/69   01/12/21 (!) 96/53       NPO Status:                                                                                 BMI:   Wt Readings from Last 3 Encounters:   01/22/21 89 lb (40.4 kg)   01/12/21 102 lb 11.2 oz (46.6 kg)   12/10/20 101 lb (45.8 kg)     There is no height or weight on file to calculate BMI.    CBC:   Lab Results   Component Value Date    WBC 12.3 01/13/2021    RBC 4.36 01/13/2021    HGB 12.3 01/13/2021    HCT 38.5 01/13/2021    MCV 88.3 01/13/2021    RDW 13.0 01/13/2021     01/13/2021       CMP:   Lab Results   Component Value Date     01/13/2021    K 3.9 01/13/2021     01/13/2021    CO2 32 01/13/2021    BUN 6 01/13/2021    CREATININE 0.5 01/13/2021    GFRAA >59 01/13/2021    LABGLOM >60 01/13/2021    GLUCOSE 90 01/13/2021    PROT 6.5 01/13/2021    CALCIUM 8.8 01/13/2021    BILITOT 0.4 01/13/2021    ALKPHOS 91 01/13/2021    AST 18 01/13/2021    ALT 9 01/13/2021 POC Tests: No results for input(s): POCGLU, POCNA, POCK, POCCL, POCBUN, POCHEMO, POCHCT in the last 72 hours. Coags:   Lab Results   Component Value Date    PROTIME 12.2 01/10/2021    INR 0.92 01/10/2021    APTT 32.0 01/10/2021       HCG (If Applicable): No results found for: PREGTESTUR, PREGSERUM, HCG, HCGQUANT     ABGs: No results found for: PHART, PO2ART, DPJ5IEC, UDN1ZKF, BEART, G6OKFXUK     Type & Screen (If Applicable):  No results found for: LABABO, LABRH    Drug/Infectious Status (If Applicable):  No results found for: HIV, HEPCAB    COVID-19 Screening (If Applicable):   Lab Results   Component Value Date    COVID19 Not Detected 02/08/2021         Anesthesia Evaluation  Patient summary reviewed and Nursing notes reviewed  Airway: Mallampati: II  TM distance: >3 FB   Neck ROM: full  Mouth opening: > = 3 FB Dental:    (+) edentulous      Pulmonary:normal exam    (+) COPD: severe,  current smoker                           Cardiovascular:  Exercise tolerance: poor (<4 METS),   (+) past MI (2003 ([place])):,          Beta Blocker:  Not on Beta Blocker         Neuro/Psych:   (+) seizures (1 isolated event years ago):, psychiatric history:   (-) neuromuscular disease            ROS comment: LEE ANN  Scoliosis  suboxone use  Chronic benzo use GI/Hepatic/Renal:   (+) bowel prep,          ROS comment: Severe Malnutrition. Endo/Other: Negative Endo/Other ROS             Pt had no PAT visit       Abdominal:           Vascular: negative vascular ROS. Anesthesia Plan      general and TIVA     ASA 3       Induction: intravenous. Anesthetic plan and risks discussed with patient.                     PERFECTO Matt - CRNA   2/11/2021

## 2021-02-11 NOTE — OP NOTE
The perianal area was inspected, and a digital rectal exam was performed. A rectal exam was performed: normal tone, no palpable lesions. At this point, a forward viewing Olympus colonoscope was inserted into the anus and carefully advanced to the sigmoid colon. The procedure was aborted due to poor prep. The colonoscope was then slowly withdrawn with careful inspection of the mucosa in a linear and circumferential fashion. suction was utilized during the procedure to remove as much air as possible from the bowel. The colonoscope was removed from the patient, and the procedure was terminated. Findings are listed below. Findings: There was a large amount of thick solid stool in the left colon. Due to the quality of the prep and inadequacy for screening, the procedure was aborted. Recommendations: The patient's chronic abdominal pain is likely a sequelae of constipation and opioid induced constipation. I would suggest minimizing any opioid exposure to help with chronic abdominal pain. 1. Repeat colonoscopy with 2-day prep for screening      Findings and recommendations were discussed w/ the patient. A copy of the images was provided.     Fiorella Villagran MD  2/11/2021  10:51 AM

## 2021-02-12 ENCOUNTER — TELEPHONE (OUTPATIENT)
Dept: GASTROENTEROLOGY | Age: 56
End: 2021-02-12

## 2021-02-12 NOTE — TELEPHONE ENCOUNTER
Gómez,    Per Dr Marshal Murray yesterday:    Findings:      There was a large amount of thick solid stool in the left colon. Due to the quality of the prep and inadequacy for screening, the procedure was aborted.         Recommendations:     The patient's chronic abdominal pain is likely a sequelae of constipation and opioid induced constipation. I would suggest minimizing any opioid exposure to help with chronic abdominal pain.      1. Repeat colonoscopy with 2-day prep for screening        Findings and recommendations were discussed w/ the patient.  A copy of the images was provided.     Amalia Tolliver MD  2/11/2021  10:51 AM

## 2021-04-30 DIAGNOSIS — F41.1 GENERALIZED ANXIETY DISORDER: ICD-10-CM

## 2021-04-30 DIAGNOSIS — F51.04 PSYCHOPHYSIOLOGICAL INSOMNIA: ICD-10-CM

## 2021-04-30 RX ORDER — CLONAZEPAM 1 MG/1
.5-1 TABLET ORAL NIGHTLY PRN
Qty: 20 TABLET | Refills: 0 | Status: SHIPPED | OUTPATIENT
Start: 2021-04-30 | End: 2021-05-17 | Stop reason: SDUPTHER

## 2021-04-30 RX ORDER — ZOLPIDEM TARTRATE 10 MG/1
5-10 TABLET ORAL NIGHTLY
Qty: 25 TABLET | Refills: 0 | Status: SHIPPED | OUTPATIENT
Start: 2021-04-30 | End: 2021-05-17 | Stop reason: SDUPTHER

## 2021-04-30 NOTE — TELEPHONE ENCOUNTER
Caller: Carol Lawler    Relationship: Self    Best call back number:[phone]    Medication needed:   Requested Prescriptions     Pending Prescriptions Disp Refills   • zolpidem (AMBIEN) 10 MG tablet 25 tablet 2     Sig: Take 0.5-1 tablets by mouth Every Night.   • clonazePAM (KlonoPIN) 1 MG tablet 20 tablet 0     Sig: Take 0.5-1 tablets by mouth At Night As Needed for Anxiety for up to 30 days.       When do you need the refill by: 04/30/21    What additional details did the patient provide when requesting the medication: PATIENT HAS APPT ON Monday MAY 4TH, SHE STATES SHE IS HAVING NIGHTMARES AND NEEDS SOMETHING TO SLEEP    Does the patient have less than a 3 day supply:  [x] Yes  [] No    What is the patient's preferred pharmacy: [facility] [address] - [phone]  - [phone] FX

## 2021-05-17 ENCOUNTER — TELEPHONE (OUTPATIENT)
Dept: INTERNAL MEDICINE | Facility: CLINIC | Age: 56
End: 2021-05-17

## 2021-05-17 ENCOUNTER — OFFICE VISIT (OUTPATIENT)
Dept: INTERNAL MEDICINE | Facility: CLINIC | Age: 56
End: 2021-05-17

## 2021-05-17 VITALS
WEIGHT: 89.5 LBS | OXYGEN SATURATION: 97 % | TEMPERATURE: 97.6 F | HEART RATE: 82 BPM | HEIGHT: 61 IN | SYSTOLIC BLOOD PRESSURE: 112 MMHG | BODY MASS INDEX: 16.9 KG/M2 | DIASTOLIC BLOOD PRESSURE: 72 MMHG | RESPIRATION RATE: 16 BRPM

## 2021-05-17 DIAGNOSIS — Z63.4 GRIEF AT LOSS OF CHILD: ICD-10-CM

## 2021-05-17 DIAGNOSIS — K29.30 CHRONIC SUPERFICIAL GASTRITIS WITHOUT BLEEDING: ICD-10-CM

## 2021-05-17 DIAGNOSIS — Z12.31 ENCOUNTER FOR SCREENING MAMMOGRAM FOR MALIGNANT NEOPLASM OF BREAST: ICD-10-CM

## 2021-05-17 DIAGNOSIS — F41.1 GENERALIZED ANXIETY DISORDER: ICD-10-CM

## 2021-05-17 DIAGNOSIS — J41.0 SIMPLE CHRONIC BRONCHITIS (HCC): ICD-10-CM

## 2021-05-17 DIAGNOSIS — F51.04 PSYCHOPHYSIOLOGICAL INSOMNIA: ICD-10-CM

## 2021-05-17 DIAGNOSIS — F43.21 GRIEF AT LOSS OF CHILD: ICD-10-CM

## 2021-05-17 DIAGNOSIS — M81.6 LOCALIZED OSTEOPOROSIS WITHOUT CURRENT PATHOLOGICAL FRACTURE: ICD-10-CM

## 2021-05-17 DIAGNOSIS — Z12.4 CERVICAL CANCER SCREENING: ICD-10-CM

## 2021-05-17 DIAGNOSIS — Z00.01 ANNUAL VISIT FOR GENERAL ADULT MEDICAL EXAMINATION WITH ABNORMAL FINDINGS: Primary | ICD-10-CM

## 2021-05-17 PROCEDURE — 99396 PREV VISIT EST AGE 40-64: CPT | Performed by: INTERNAL MEDICINE

## 2021-05-17 RX ORDER — ALBUTEROL SULFATE 90 UG/1
2 AEROSOL, METERED RESPIRATORY (INHALATION) EVERY 4 HOURS PRN
Qty: 1 G | Refills: 5 | Status: SHIPPED | OUTPATIENT
Start: 2021-05-17 | End: 2021-05-28 | Stop reason: SDUPTHER

## 2021-05-17 RX ORDER — BUPRENORPHINE HYDROCHLORIDE AND NALOXONE HYDROCHLORIDE DIHYDRATE 8; 2 MG/1; MG/1
1 TABLET SUBLINGUAL DAILY
COMMUNITY
Start: 2020-12-31 | End: 2021-10-18 | Stop reason: SDUPTHER

## 2021-05-17 RX ORDER — ZOLPIDEM TARTRATE 10 MG/1
10 TABLET ORAL NIGHTLY
Qty: 30 TABLET | Refills: 0 | Status: SHIPPED | OUTPATIENT
Start: 2021-05-17 | End: 2021-05-28 | Stop reason: SDUPTHER

## 2021-05-17 RX ORDER — CLONAZEPAM 1 MG/1
.5-1 TABLET ORAL DAILY PRN
Qty: 20 TABLET | Refills: 0 | Status: SHIPPED | OUTPATIENT
Start: 2021-05-17 | End: 2021-05-28 | Stop reason: SDUPTHER

## 2021-05-17 RX ORDER — OMEPRAZOLE 20 MG/1
20 CAPSULE, DELAYED RELEASE ORAL DAILY
Qty: 30 CAPSULE | Refills: 5 | Status: SHIPPED | OUTPATIENT
Start: 2021-05-17 | End: 2021-05-28 | Stop reason: SDUPTHER

## 2021-05-17 SDOH — SOCIAL STABILITY - SOCIAL INSECURITY: DISSAPEARANCE AND DEATH OF FAMILY MEMBER: Z63.4

## 2021-05-17 NOTE — TELEPHONE ENCOUNTER
CALLED THE PHARMACY TO LET THEM ITS OKAY TO REFILL THE MEDICATION, SHE STATES THAT INSURANCE MAY NOT COVER SO SHE WOULD HAVE T PAY CASH.   PATIENT WAS INFORMED

## 2021-05-17 NOTE — PROGRESS NOTES
CC: f/u preventive health    History:  Carol Lawler is a 55 y.o. female who presents today for evaluation of the above problems.    She notes she has been doing reasonably well, though her daughter was recently killed and she is in the process of bringing her granddaughter to [place] to live.  Her anxiety has been running high and she admits to having a lot of grief symptoms, though she feels she is processing these reasonably well.  She is using Klonopin, but has been able to decrease the amount over the last couple of weeks.  She is also using Ambien to help her sleep.      ROS:  Review of Systems   Constitutional: Negative for chills and fever.   HENT: Negative for congestion and sore throat.    Eyes: Negative for visual disturbance.   Respiratory: Negative for cough and shortness of breath.    Cardiovascular: Negative for chest pain and palpitations.   Gastrointestinal: Negative for abdominal pain, constipation and nausea.   Endocrine: Negative for cold intolerance and heat intolerance.   Genitourinary: Negative for difficulty urinating and frequency.   Musculoskeletal: Negative for arthralgias and back pain.   Skin: Negative for rash.   Neurological: Negative for dizziness and headaches.   Psychiatric/Behavioral: Negative for dysphoric mood. The patient is not nervous/anxious.        Allergies   Allergen Reactions   • Aspirin Hives   • Ibuprofen Unknown - Low Severity   • Iodine Unknown - Low Severity   • Sulfa Antibiotics Unknown - Low Severity     Past Medical History:   Diagnosis Date   • Anxiety    • COPD (chronic obstructive pulmonary disease) (CMS/[facility])    • Insomnia    • Osteoporosis      Past Surgical History:   Procedure Laterality Date   • TONSILLECTOMY     • TUBAL ABDOMINAL LIGATION       Family History   Problem Relation Age of Onset   • Diabetes Mother    • Hypertension Mother    • Hyperlipidemia Mother    • Cancer Mother    • Anxiety disorder Father       reports that she has been smoking  "cigarettes. She has a 45.00 pack-year smoking history. She has never used smokeless tobacco. She reports that she does not drink alcohol and does not use drugs.      Current Outpatient Medications:   •  albuterol sulfate  (90 Base) MCG/ACT inhaler, Inhale 2 puffs Every 4 (Four) Hours As Needed for Wheezing., Disp: 1 g, Rfl: 5  •  alendronate (FOSAMAX) 70 MG tablet, Take 1 tablet by mouth 1 (One) Time Per Week., Disp: 4 tablet, Rfl: 11  •  buprenorphine-naloxone (SUBOXONE) 8-2 MG per SL tablet, Place 1 tablet under the tongue Daily. 1/2 tablet qam 1/2 qpm Dr Carmen Cedeno/ [facility], Disp: , Rfl:   •  clonazePAM (KlonoPIN) 1 MG tablet, Take 0.5-1 tablets by mouth Daily As Needed for Anxiety for up to 30 days., Disp: 20 tablet, Rfl: 0  •  omeprazole (PrilOSEC) 20 MG capsule, Take 1 capsule by mouth Daily., Disp: 30 capsule, Rfl: 5  •  zolpidem (AMBIEN) 10 MG tablet, Take 1 tablet by mouth Every Night., Disp: 30 tablet, Rfl: 0    OBJECTIVE:  /72 (BP Location: Left arm, Patient Position: Sitting, Cuff Size: Adult)   Pulse 82   Temp 97.6 °F (36.4 °C)   Resp 16   Ht 154.9 cm (61\")   Wt 40.6 kg (89 lb 8 oz)   SpO2 97%   Breastfeeding No   BMI 16.91 kg/m²    Physical Exam  Constitutional:       General: She is not in acute distress.     Appearance: She is well-developed.   HENT:      Head: Normocephalic and atraumatic.      Right Ear: External ear normal.      Left Ear: External ear normal.   Eyes:      General: No scleral icterus.     Extraocular Movements: Extraocular movements intact.   Neck:      Trachea: No tracheal deviation.   Cardiovascular:      Rate and Rhythm: Normal rate and regular rhythm.      Heart sounds: Normal heart sounds. No murmur heard.     Pulmonary:      Effort: Pulmonary effort is normal. No accessory muscle usage or respiratory distress.      Breath sounds: Normal breath sounds. No wheezing.   Abdominal:      General: There is no distension.      Palpations: Abdomen is " soft.      Tenderness: There is no abdominal tenderness.   Musculoskeletal:         General: Normal range of motion.      Cervical back: Normal range of motion and neck supple.      Right lower leg: No edema.      Left lower leg: No edema.   Skin:     General: Skin is warm and dry.      Nails: There is no clubbing.   Neurological:      Mental Status: She is alert and oriented to person, place, and time.      Coordination: Coordination normal.      Gait: Gait normal.   Psychiatric:         Mood and Affect: Mood normal. Mood is not anxious or depressed.         Behavior: Behavior normal.       Assessment/Plan     Diagnoses and all orders for this visit:    1. Annual visit for general adult medical examination with abnormal findings (Primary)  Immunizations:      - Tetanus: Due, but refused.      - Influenza: Recommend yearly.      - Pneumovax: Due, but refused.      - Prevnar: Once after age 65      - Shingrix: Series after 50  CRC screening: Colonoscopy done 0 years ago with need to repeat due to poor prep. She will schedule with Dr. Roberts per her report.   Mammogram: Mammogram ordered today.  PAP: Referred to GYN for PAP given history of abnormal PAP smears.  DEXA: DEXA scan ordered to follow up existing osteoporosis, Previous T-score by DEXA was -3.5 in 2019.  COVID vaccination recommended and [email or web address] info provided.     2. Generalized anxiety disorder  3. Grief at loss of child  4. Psychophysiological insomnia  -     clonazePAM (KlonoPIN) 1 MG tablet; Take 0.5-1 tablets by mouth Daily As Needed for Anxiety for up to 30 days.  Dispense: 20 tablet; Refill: 0  -     zolpidem (AMBIEN) 10 MG tablet; Take 1 tablet by mouth Every Night.  Dispense: 30 tablet; Refill: 0  OTIS reviewed and clonazepam refilled to be used as needed. Ambien refilled also and she is encouraged not to use these things together.     5. Chronic superficial gastritis without bleeding  -     omeprazole (PrilOSEC) 20 MG capsule; Take 1  capsule by mouth Daily.  Dispense: 30 capsule; Refill: 5  Stable without exacerbation on PPI.    6. Simple chronic bronchitis (CMS/HCC)  -     albuterol sulfate  (90 Base) MCG/ACT inhaler; Inhale 2 puffs Every 4 (Four) Hours As Needed for Wheezing.  Dispense: 1 g; Refill: 5    7. Localized osteoporosis without current pathological fracture  -     DEXA Bone Density Axial; Future  DEXA to monitor.     8. Encounter for screening mammogram for malignant neoplasm of breast  -     Mammo Screening Digital Tomosynthesis Bilateral With CAD; Future    9. Cervical cancer screening  -     Ambulatory Referral to Obstetrics / Gynecology         An After Visit Summary was printed and given to the patient at discharge.  Return in about 4 months (around 9/17/2021) for Recheck.         Manuelito Griffin D.O. 5/17/2021   Electronically signed.

## 2021-05-17 NOTE — TELEPHONE ENCOUNTER
Caller: Carol Lawler    Relationship to patient: Self    Best call back number: [phone]      Patient is needing: Patient said that she discussed w/  this morning about getting Ambien refilled earlier due to an emergency and having to go out of town for a week and a half, she is asking for someone to call the pharmacy to approve being filled early.     She is also asking for a B-12 injection to be called in.     Please call when done, she is trying to leave [facility] to go be with her [facility].

## 2021-05-20 ENCOUNTER — APPOINTMENT (OUTPATIENT)
Dept: CT IMAGING | Age: 56
End: 2021-05-20
Payer: MEDICAID

## 2021-05-20 ENCOUNTER — HOSPITAL ENCOUNTER (EMERGENCY)
Age: 56
Discharge: HOME | End: 2021-05-20
Payer: MEDICAID

## 2021-05-20 ENCOUNTER — APPOINTMENT (OUTPATIENT)
Dept: GENERAL RADIOLOGY | Age: 56
End: 2021-05-20
Payer: MEDICAID

## 2021-05-20 VITALS
WEIGHT: 95 LBS | SYSTOLIC BLOOD PRESSURE: 99 MMHG | HEART RATE: 64 BPM | DIASTOLIC BLOOD PRESSURE: 67 MMHG | BODY MASS INDEX: 17.94 KG/M2 | HEIGHT: 61 IN | OXYGEN SATURATION: 88 % | TEMPERATURE: 97.8 F | RESPIRATION RATE: 17 BRPM

## 2021-05-20 DIAGNOSIS — K83.8 DILATED BILE DUCT: ICD-10-CM

## 2021-05-20 DIAGNOSIS — R10.11 ABDOMINAL PAIN, RIGHT UPPER QUADRANT: Primary | ICD-10-CM

## 2021-05-20 LAB
ALBUMIN SERPL-MCNC: 4.2 G/DL (ref 3.5–5.2)
ALP BLD-CCNC: 101 U/L (ref 35–104)
ALT SERPL-CCNC: 16 U/L (ref 5–33)
ANION GAP SERPL CALCULATED.3IONS-SCNC: 6 MMOL/L (ref 7–19)
AST SERPL-CCNC: 25 U/L (ref 5–32)
BASOPHILS ABSOLUTE: 0.1 K/UL (ref 0–0.2)
BASOPHILS RELATIVE PERCENT: 0.9 % (ref 0–1)
BILIRUB SERPL-MCNC: <0.2 MG/DL (ref 0.2–1.2)
BILIRUBIN URINE: NEGATIVE
BLOOD, URINE: NEGATIVE
BUN BLDV-MCNC: 17 MG/DL (ref 6–20)
CALCIUM SERPL-MCNC: 9.4 MG/DL (ref 8.6–10)
CHLORIDE BLD-SCNC: 100 MMOL/L (ref 98–111)
CLARITY: CLEAR
CO2: 31 MMOL/L (ref 22–29)
COLOR: YELLOW
CREAT SERPL-MCNC: 0.7 MG/DL (ref 0.5–0.9)
EOSINOPHILS ABSOLUTE: 0.2 K/UL (ref 0–0.6)
EOSINOPHILS RELATIVE PERCENT: 2.5 % (ref 0–5)
GFR AFRICAN AMERICAN: >59
GFR NON-AFRICAN AMERICAN: >60
GLUCOSE BLD-MCNC: 70 MG/DL (ref 74–109)
GLUCOSE URINE: NEGATIVE MG/DL
HCT VFR BLD CALC: 36.5 % (ref 37–47)
HEMOGLOBIN: 11.7 G/DL (ref 12–16)
IMMATURE GRANULOCYTES #: 0 K/UL
KETONES, URINE: NEGATIVE MG/DL
LEUKOCYTE ESTERASE, URINE: NEGATIVE
LIPASE: 46 U/L (ref 13–60)
LYMPHOCYTES ABSOLUTE: 2.8 K/UL (ref 1.1–4.5)
LYMPHOCYTES RELATIVE PERCENT: 37 % (ref 20–40)
MCH RBC QN AUTO: 28.3 PG (ref 27–31)
MCHC RBC AUTO-ENTMCNC: 32.1 G/DL (ref 33–37)
MCV RBC AUTO: 88.4 FL (ref 81–99)
MONOCYTES ABSOLUTE: 0.8 K/UL (ref 0–0.9)
MONOCYTES RELATIVE PERCENT: 10.4 % (ref 0–10)
NEUTROPHILS ABSOLUTE: 3.7 K/UL (ref 1.5–7.5)
NEUTROPHILS RELATIVE PERCENT: 48.8 % (ref 50–65)
NITRITE, URINE: NEGATIVE
PDW BLD-RTO: 13.5 % (ref 11.5–14.5)
PH UA: 6 (ref 5–8)
PLATELET # BLD: 354 K/UL (ref 130–400)
PMV BLD AUTO: 8.1 FL (ref 9.4–12.3)
POTASSIUM REFLEX MAGNESIUM: 4.1 MMOL/L (ref 3.5–5)
PROTEIN UA: NEGATIVE MG/DL
RBC # BLD: 4.13 M/UL (ref 4.2–5.4)
SODIUM BLD-SCNC: 137 MMOL/L (ref 136–145)
SPECIFIC GRAVITY UA: 1.01 (ref 1–1.03)
TOTAL PROTEIN: 7 G/DL (ref 6.6–8.7)
TROPONIN: <0.01 NG/ML (ref 0–0.03)
UROBILINOGEN, URINE: 0.2 E.U./DL
WBC # BLD: 7.6 K/UL (ref 4.8–10.8)

## 2021-05-20 PROCEDURE — 80053 COMPREHEN METABOLIC PANEL: CPT

## 2021-05-20 PROCEDURE — 83690 ASSAY OF LIPASE: CPT

## 2021-05-20 PROCEDURE — 2580000003 HC RX 258: Performed by: NURSE PRACTITIONER

## 2021-05-20 PROCEDURE — 36415 COLL VENOUS BLD VENIPUNCTURE: CPT

## 2021-05-20 PROCEDURE — 74176 CT ABD & PELVIS W/O CONTRAST: CPT

## 2021-05-20 PROCEDURE — 85025 COMPLETE CBC W/AUTO DIFF WBC: CPT

## 2021-05-20 PROCEDURE — 84484 ASSAY OF TROPONIN QUANT: CPT

## 2021-05-20 PROCEDURE — 6360000002 HC RX W HCPCS: Performed by: NURSE PRACTITIONER

## 2021-05-20 PROCEDURE — 71045 X-RAY EXAM CHEST 1 VIEW: CPT

## 2021-05-20 PROCEDURE — 96374 THER/PROPH/DIAG INJ IV PUSH: CPT

## 2021-05-20 PROCEDURE — 96375 TX/PRO/DX INJ NEW DRUG ADDON: CPT

## 2021-05-20 PROCEDURE — 81003 URINALYSIS AUTO W/O SCOPE: CPT

## 2021-05-20 PROCEDURE — 99284 EMERGENCY DEPT VISIT MOD MDM: CPT

## 2021-05-20 PROCEDURE — 93005 ELECTROCARDIOGRAM TRACING: CPT

## 2021-05-20 RX ORDER — POLYETHYLENE GLYCOL 3350 17 G/17G
17 POWDER, FOR SOLUTION ORAL DAILY
Qty: 527 G | Refills: 1 | Status: SHIPPED | OUTPATIENT
Start: 2021-05-20 | End: 2021-06-19

## 2021-05-20 RX ORDER — MORPHINE SULFATE 4 MG/ML
4 INJECTION, SOLUTION INTRAMUSCULAR; INTRAVENOUS ONCE
Status: COMPLETED | OUTPATIENT
Start: 2021-05-20 | End: 2021-05-20

## 2021-05-20 RX ORDER — PROMETHAZINE HYDROCHLORIDE 25 MG/ML
6.25 INJECTION, SOLUTION INTRAMUSCULAR; INTRAVENOUS ONCE
Status: COMPLETED | OUTPATIENT
Start: 2021-05-20 | End: 2021-05-20

## 2021-05-20 RX ORDER — ALENDRONATE SODIUM 70 MG/1
70 TABLET ORAL WEEKLY
COMMUNITY
Start: 2020-10-06

## 2021-05-20 RX ORDER — 0.9 % SODIUM CHLORIDE 0.9 %
1000 INTRAVENOUS SOLUTION INTRAVENOUS ONCE
Status: COMPLETED | OUTPATIENT
Start: 2021-05-20 | End: 2021-05-20

## 2021-05-20 RX ADMIN — MORPHINE SULFATE 4 MG: 4 INJECTION, SOLUTION INTRAMUSCULAR; INTRAVENOUS at 17:13

## 2021-05-20 RX ADMIN — PROMETHAZINE HYDROCHLORIDE 6.25 MG: 25 INJECTION INTRAMUSCULAR; INTRAVENOUS at 17:13

## 2021-05-20 RX ADMIN — SODIUM CHLORIDE 1000 ML: 9 INJECTION, SOLUTION INTRAVENOUS at 17:12

## 2021-05-20 ASSESSMENT — PAIN DESCRIPTION - DESCRIPTORS: DESCRIPTORS: NAGGING

## 2021-05-20 ASSESSMENT — ENCOUNTER SYMPTOMS
SHORTNESS OF BREATH: 1
ABDOMINAL PAIN: 1
ABDOMINAL DISTENTION: 1

## 2021-05-20 ASSESSMENT — PAIN DESCRIPTION - LOCATION: LOCATION: ABDOMEN

## 2021-05-20 NOTE — ED PROVIDER NOTES
[facility]  eMERGENCY dEPARTMENT eNCOUnter      Pt Name: Danya Chao  MRN: [id number]  [place] 1965  Date of evaluation: 5/20/2021  Provider: Tobias Gonzalez, [address]       Chief Complaint   Patient presents with    Abdominal Pain     and swelling since last night         HISTORY OF PRESENT ILLNESS   (Location/Symptom, Timing/Onset,Context/Setting, Quality, Duration, Modifying Factors, Severity)  Note limiting factors. Neli Kim a 50 y.o. female who presents to the emergency department for evaluation of abdominal pain. Pt tells me that she has had right upper abdominal pain over past year. She tells me that she had follow up with her pmd last year with concern for gallbladder disease. She tells me that she has had increased right upper abdominal pain since last night associated with abdominal distension. She relates that she took laxatives today having had bowel movement without improvement in symptoms. She denies concern for blood in stool. She has had no fevers or cough. She tells me that she has copd relating increased shortness of breath associated with abdominal distension. She denies chest pain as well as recent illness. She has had no vomiting and reports normal intake of food and fluids. Pt denies previous abdominal surgery. HPI    Nursing Notes were reviewed. REVIEW OF SYSTEMS    (2-9 systems for level 4, 10 or more for level 5)     Review of Systems   Constitutional: Negative for fever. Respiratory: Positive for shortness of breath. Gastrointestinal: Positive for abdominal distention and abdominal pain. All other systems reviewed and are negative. A complete review of systems was performed and is negative except as noted above in the HPI.        PAST MEDICAL HISTORY     Past Medical History:   Diagnosis Date    COPD (chronic obstructive pulmonary disease) ([facility] [address]     GERD (gastroesophageal reflux disease)     History of MI (myocardial infarction) 2003  History of seizure     years ago    Scoliosis          SURGICAL HISTORY       Past Surgical History:   Procedure Laterality Date    COLONOSCOPY N/A 02/11/2021    Dr Kath Topete-Incomplete due due to poor prep    ENDOSCOPIC ULTRASOUND (LOWER) N/A 01/12/2021    Dr Kath Topete-Common bile duct dilation of unclear etiology, questionably due to chronic opioid use, gastritis    TONSILLECTOMY      UPPER GASTROINTESTINAL ENDOSCOPY  01/12/2021    Dr Justine Hsu       Discharge Medication List as of 5/20/2021  8:35 PM      CONTINUE these medications which have NOT CHANGED    Details   alendronate (FOSAMAX) 70 MG tablet Take 70 mg by mouth once a weekHistorical Med      ALBUTEROL IN Inhale into the lungsHistorical Med      buprenorphine-naloxone (SUBOXONE) 8-2 MG SUBL SL tablet DISSOLVE 1 AND 1/2 TABLET UNDER THE TONGUE ONCE DAILY FOR 5 DAYSHistorical Med      pantoprazole (PROTONIX) 40 MG tablet Take 1 tablet by mouth every morning (before breakfast), Disp-30 tablet, R-2Normal      zolpidem (AMBIEN) 10 MG tablet Take 5-10 mg by mouth nightly. Historical Med      clonazePAM (KLONOPIN) 0.5 MG tablet Take 1 mg by mouth 2 times daily as needed.  Historical Med             ALLERGIES     Aspirin, Ibuprofen, Iodine, and Sulfa antibiotics    FAMILY HISTORY       Family History   Problem Relation Age of Onset    Colon Cancer Neg Hx     Colon Polyps Neg Hx           SOCIAL HISTORY       Social History     Socioeconomic History    Marital status:      Spouse name: None    Number of children: None    Years of education: None    Highest education level: None   Occupational History    None   Tobacco Use    Smoking status: Current Every Day Smoker     Packs/day: 1.00     Types: Cigarettes    Smokeless tobacco: Never Used   Vaping Use    Vaping Use: Never used   Substance and Sexual Activity    Alcohol use: Never    Drug use: Never    Sexual activity: None   Other Topics Concern    None is soft. Tenderness: There is abdominal tenderness in the right upper quadrant. Musculoskeletal:         General: Normal range of motion. Cervical back: Normal range of motion. Skin:     General: Skin is warm and dry. Neurological:      Mental Status: She is alert and oriented to person, place, and time. DIAGNOSTIC RESULTS     EKG: All EKG's are interpreted by the Emergency Department Physician who either signs or Co-signs this chart in the absence of acardiologist.        RADIOLOGY:   Non-plain film images such as CT, Ultrasound andMRI are read by the radiologist. Plain radiographic images are visualized and preliminarily interpreted by the emergency physician with the below findings:        Interpretation per the Radiologist below, if available at the time of this note:    CT ABDOMEN PELVIS WO CONTRAST Additional Contrast? None   Final Result   1. Moderate constipation. There is no obstruction or free air. The   appendix is identified and is normal in appearance. 2. No evidence of nephrolithiasis or obstructive uropathy. 3. Mild extrahepatic biliary dilatation similar in appearance to the   previous exam. The pancreatic duct is also mildly prominent. I do not   see a definite discrete pancreatic head mass or evidence of   choledocholithiasis but the pancreas is limited for assessment   secondary to lack of IV and oral contrast administration. I would   suggest that in the future if the patient is due undergo CT imaging   that she obtain both IV and oral contrast secondary to her lack of   intra-abdominal fat. 4. Tiny fat-containing periumbilical hernia. 5. The uterus and adnexa are unremarkable. The urinary bladder is   normal in appearance. .    Signed by Dr Daniel Stack on 5/20/2021 7:22 PM      XR CHEST PORTABLE   Final Result   1.. Emphysematous changes of the lungs with hyperinflation of the lung   parenchyma.    2. Otherwise normal chest.   Signed by Dr Daniel Stack on 5/20/2021 5:23 PM            ED BEDSIDE ULTRASOUND:   Performed by ED Physician - none    LABS:  Labs Reviewed   CBC WITH AUTO DIFFERENTIAL - Abnormal; Notable for the following components:       Result Value    RBC 4.13 (*)     Hemoglobin 11.7 (*)     Hematocrit 36.5 (*)     MCHC 32.1 (*)     MPV 8.1 (*)     Neutrophils % 48.8 (*)     Monocytes % 10.4 (*)     All other components within normal limits   COMPREHENSIVE METABOLIC PANEL W/ REFLEX TO MG FOR LOW K - Abnormal; Notable for the following components:    CO2 31 (*)     Anion Gap 6 (*)     Glucose 70 (*)     All other components within normal limits   URINE RT REFLEX TO CULTURE   LIPASE   TROPONIN       All other labs were within normal range or not returned as of this dictation. RE-ASSESSMENT           EMERGENCY DEPARTMENT COURSE and DIFFERENTIALDIAGNOSIS/MDM:   Vitals:    Vitals:    05/20/21 1700 05/20/21 1725 05/20/21 1727 05/20/21 1732   BP:    99/67   Pulse: 88 64 58 64   Resp:  17 18 17   Temp:       SpO2:  95% 94% (!) 88%   Weight:       Height:           MDM      CONSULTS:  None    PROCEDURES:  Unless otherwise notedbelow, none     Procedures    FINAL IMPRESSION     1. Abdominal pain, right upper quadrant    2.  Dilated bile duct          DISPOSITION/PLAN   DISPOSITION        PATIENT REFERRED TO:  Bertin Isabela  [address] [place] [address]    Schedule an appointment as soon as possible for a visit   follow up [place] [address], [place]  [facility]  [phone]    Schedule an appointment as soon as possible for a visit   dilated bile duct      DISCHARGE MEDICATIONS:       Discharge Medication List as of 5/20/2021  8:35 PM          (Pleasenote that portions of this note were completed with a voice recognition program.  Efforts were made to edit the dictations but occasionally words are mis-transcribed.)              Frank Gonzalez, APRN  05/21/21 0032

## 2021-05-24 LAB
EKG P AXIS: 78 DEGREES
EKG P-R INTERVAL: 180 MS
EKG Q-T INTERVAL: 386 MS
EKG QRS DURATION: 68 MS
EKG QTC CALCULATION (BAZETT): 403 MS
EKG T AXIS: 63 DEGREES

## 2021-05-28 ENCOUNTER — TELEPHONE (OUTPATIENT)
Dept: INTERNAL MEDICINE | Facility: CLINIC | Age: 56
End: 2021-05-28

## 2021-05-28 DIAGNOSIS — F51.04 PSYCHOPHYSIOLOGICAL INSOMNIA: ICD-10-CM

## 2021-05-28 DIAGNOSIS — J41.0 SIMPLE CHRONIC BRONCHITIS: ICD-10-CM

## 2021-05-28 DIAGNOSIS — M81.6 LOCALIZED OSTEOPOROSIS WITHOUT CURRENT PATHOLOGICAL FRACTURE: ICD-10-CM

## 2021-05-28 DIAGNOSIS — F41.1 GENERALIZED ANXIETY DISORDER: ICD-10-CM

## 2021-05-28 DIAGNOSIS — K29.30 CHRONIC SUPERFICIAL GASTRITIS WITHOUT BLEEDING: ICD-10-CM

## 2021-05-28 RX ORDER — ALBUTEROL SULFATE 90 UG/1
2 AEROSOL, METERED RESPIRATORY (INHALATION) EVERY 4 HOURS PRN
Qty: 1 G | Refills: 0 | Status: SHIPPED | OUTPATIENT
Start: 2021-05-28 | End: 2021-10-18 | Stop reason: SDUPTHER

## 2021-05-28 RX ORDER — OMEPRAZOLE 20 MG/1
20 CAPSULE, DELAYED RELEASE ORAL DAILY
Qty: 30 CAPSULE | Refills: 0 | Status: SHIPPED | OUTPATIENT
Start: 2021-05-28 | End: 2021-10-18 | Stop reason: SDUPTHER

## 2021-05-28 RX ORDER — ALENDRONATE SODIUM 70 MG/1
70 TABLET ORAL WEEKLY
Qty: 4 TABLET | Refills: 0 | Status: SHIPPED | OUTPATIENT
Start: 2021-05-28 | End: 2021-10-18 | Stop reason: SDUPTHER

## 2021-05-28 RX ORDER — CLONAZEPAM 1 MG/1
.5-1 TABLET ORAL DAILY PRN
Qty: 20 TABLET | Refills: 0 | Status: SHIPPED | OUTPATIENT
Start: 2021-05-28 | End: 2021-06-28 | Stop reason: SDUPTHER

## 2021-05-28 RX ORDER — BUPRENORPHINE HYDROCHLORIDE AND NALOXONE HYDROCHLORIDE DIHYDRATE 8; 2 MG/1; MG/1
1 TABLET SUBLINGUAL DAILY
Qty: 30 TABLET | Status: CANCELLED | OUTPATIENT
Start: 2021-05-28

## 2021-05-28 RX ORDER — ZOLPIDEM TARTRATE 10 MG/1
10 TABLET ORAL NIGHTLY
Qty: 14 TABLET | Refills: 0 | Status: SHIPPED | OUTPATIENT
Start: 2021-05-28 | End: 2021-06-28 | Stop reason: SDUPTHER

## 2021-05-28 NOTE — TELEPHONE ENCOUNTER
Caller: Carol Lawler    Relationship: Self    Best call back number: [phone]    Medication needed:   Requested Prescriptions     Pending Prescriptions Disp Refills   • albuterol sulfate  (90 Base) MCG/ACT inhaler 1 g 5     Sig: Inhale 2 puffs Every 4 (Four) Hours As Needed for Wheezing.   • omeprazole (PrilOSEC) 20 MG capsule 30 capsule 5     Sig: Take 1 capsule by mouth Daily.   • clonazePAM (KlonoPIN) 1 MG tablet 20 tablet 0     Sig: Take 0.5-1 tablets by mouth Daily As Needed for Anxiety for up to 30 days.   • buprenorphine-naloxone (SUBOXONE) 8-2 MG per SL tablet 30 tablet      Sig: Place 1 tablet under the tongue Daily. 1/2 tablet qam 1/2 qpm  Dr Carmen Cedeno/ [facility]   • alendronate (FOSAMAX) 70 MG tablet 4 tablet 11     Sig: Take 1 tablet by mouth 1 (One) Time Per Week.   • zolpidem (AMBIEN) 10 MG tablet 30 tablet 0     Sig: Take 1 tablet by mouth Every Night.       When do you need the refill by: NEEDS TODAY BY 2PM    What additional details did the patient provide when requesting the medication: PATIENT IS MOVING BACK TO [place], SHE NEEDS ALL OF HER MEDICATION TO BE REFILLED. SHE WILL PAY FOR IT OUT OF POCKET IF NEEDED. PLEASE ADVISE.    SHE STATES SHE WOULD LIKE 30 DAYS WORTH OF MEDICATION     Does the patient have less than a 3 day supply:  [x] Yes  [] No    What is the patient's preferred pharmacy: [facility] [address] - [phone] [facility] [phone] FX

## 2021-05-28 NOTE — TELEPHONE ENCOUNTER
PATIENT HAS CALLED, SHE STATED THAT [facility] STATED THAT THE CONTROLLED MEDICATIONS CANNOT BE FILLED BECAUSE ITS 1 AND 1/2 WEEKS EARLY.  SHE STATED THAT SHE WILL HAVE TO PAY OUT OF POCKET.   IT WILL TAKE 1 MONTH FOR HER TO GET HER MEDICAID IN [place].    SHE WANTED DR SOLIZ TO KNOW THAT SHE LOVES HIM AND APPRECIATES ALL HE HAS DONE.

## 2021-06-18 ENCOUNTER — HOSPITAL ENCOUNTER (INPATIENT)
Age: 56
LOS: 1 days | Discharge: HOME | DRG: 303 | End: 2021-06-19
Attending: EMERGENCY MEDICINE | Admitting: HOSPITALIST
Payer: MEDICAID

## 2021-06-18 ENCOUNTER — APPOINTMENT (OUTPATIENT)
Dept: GENERAL RADIOLOGY | Age: 56
DRG: 303 | End: 2021-06-18
Payer: MEDICAID

## 2021-06-18 DIAGNOSIS — R07.9 ACUTE CHEST PAIN: Primary | ICD-10-CM

## 2021-06-18 PROBLEM — Z71.6 TOBACCO ABUSE COUNSELING: Status: ACTIVE | Noted: 2021-06-18

## 2021-06-18 PROBLEM — F17.219 CIGARETTE NICOTINE DEPENDENCE WITH NICOTINE-INDUCED DISORDER: Status: ACTIVE | Noted: 2021-06-18

## 2021-06-18 PROBLEM — Z91.199 PERSONAL HISTORY OF NONCOMPLIANCE WITH MEDICAL TREATMENT AND REGIMEN: Status: ACTIVE | Noted: 2021-06-18

## 2021-06-18 PROBLEM — I25.10 CAD (CORONARY ARTERY DISEASE): Status: ACTIVE | Noted: 2021-06-18

## 2021-06-18 PROBLEM — I25.2 OLD MI (MYOCARDIAL INFARCTION): Status: ACTIVE | Noted: 2021-06-18

## 2021-06-18 PROBLEM — I20.0 UNSTABLE ANGINA PECTORIS (HCC): Status: ACTIVE | Noted: 2021-06-18

## 2021-06-18 LAB
ALBUMIN SERPL-MCNC: 4.8 G/DL (ref 3.5–5.2)
ALP BLD-CCNC: 108 U/L (ref 35–104)
ALT SERPL-CCNC: 14 U/L (ref 5–33)
ANION GAP SERPL CALCULATED.3IONS-SCNC: 9 MMOL/L (ref 7–19)
APTT: 30.4 SEC (ref 26–36.2)
APTT: 45.9 SEC (ref 26–36.2)
APTT: 55.3 SEC (ref 26–36.2)
AST SERPL-CCNC: 23 U/L (ref 5–32)
BASOPHILS ABSOLUTE: 0.1 K/UL (ref 0–0.2)
BASOPHILS RELATIVE PERCENT: 0.5 % (ref 0–1)
BILIRUB SERPL-MCNC: 0.4 MG/DL (ref 0.2–1.2)
BUN BLDV-MCNC: 14 MG/DL (ref 6–20)
CALCIUM SERPL-MCNC: 10 MG/DL (ref 8.6–10)
CHLORIDE BLD-SCNC: 96 MMOL/L (ref 98–111)
CO2: 30 MMOL/L (ref 22–29)
CREAT SERPL-MCNC: 0.7 MG/DL (ref 0.5–0.9)
D DIMER: 0.43 UG/ML FEU (ref 0–0.48)
EOSINOPHILS ABSOLUTE: 0.1 K/UL (ref 0–0.6)
EOSINOPHILS RELATIVE PERCENT: 0.5 % (ref 0–5)
GFR AFRICAN AMERICAN: >59
GFR NON-AFRICAN AMERICAN: >60
GLUCOSE BLD-MCNC: 114 MG/DL (ref 74–109)
HCT VFR BLD CALC: 48.7 % (ref 37–47)
HEMOGLOBIN: 15.8 G/DL (ref 12–16)
IMMATURE GRANULOCYTES #: 0 K/UL
LV EF: 58 %
LVEF MODALITY: NORMAL
LYMPHOCYTES ABSOLUTE: 1.8 K/UL (ref 1.1–4.5)
LYMPHOCYTES RELATIVE PERCENT: 19 % (ref 20–40)
MCH RBC QN AUTO: 28.3 PG (ref 27–31)
MCHC RBC AUTO-ENTMCNC: 32.4 G/DL (ref 33–37)
MCV RBC AUTO: 87.1 FL (ref 81–99)
MONOCYTES ABSOLUTE: 0.5 K/UL (ref 0–0.9)
MONOCYTES RELATIVE PERCENT: 5.6 % (ref 0–10)
NEUTROPHILS ABSOLUTE: 6.9 K/UL (ref 1.5–7.5)
NEUTROPHILS RELATIVE PERCENT: 74 % (ref 50–65)
PDW BLD-RTO: 12.9 % (ref 11.5–14.5)
PLATELET # BLD: 402 K/UL (ref 130–400)
PMV BLD AUTO: 8 FL (ref 9.4–12.3)
POTASSIUM SERPL-SCNC: 3.9 MMOL/L (ref 3.5–5)
PRO-BNP: 88 PG/ML (ref 0–900)
RBC # BLD: 5.59 M/UL (ref 4.2–5.4)
SARS-COV-2, NAAT: NOT DETECTED
SODIUM BLD-SCNC: 135 MMOL/L (ref 136–145)
TOTAL PROTEIN: 8.4 G/DL (ref 6.6–8.7)
TROPONIN: <0.01 NG/ML (ref 0–0.03)
TROPONIN: <0.01 NG/ML (ref 0–0.03)
WBC # BLD: 9.4 K/UL (ref 4.8–10.8)

## 2021-06-18 PROCEDURE — 36415 COLL VENOUS BLD VENIPUNCTURE: CPT

## 2021-06-18 PROCEDURE — G0378 HOSPITAL OBSERVATION PER HR: HCPCS

## 2021-06-18 PROCEDURE — 96368 THER/DIAG CONCURRENT INF: CPT

## 2021-06-18 PROCEDURE — 71045 X-RAY EXAM CHEST 1 VIEW: CPT

## 2021-06-18 PROCEDURE — 99284 EMERGENCY DEPT VISIT MOD MDM: CPT

## 2021-06-18 PROCEDURE — 85730 THROMBOPLASTIN TIME PARTIAL: CPT

## 2021-06-18 PROCEDURE — 87635 SARS-COV-2 COVID-19 AMP PRB: CPT

## 2021-06-18 PROCEDURE — 96375 TX/PRO/DX INJ NEW DRUG ADDON: CPT

## 2021-06-18 PROCEDURE — 84484 ASSAY OF TROPONIN QUANT: CPT

## 2021-06-18 PROCEDURE — 93306 TTE W/DOPPLER COMPLETE: CPT

## 2021-06-18 PROCEDURE — 2500000003 HC RX 250 WO HCPCS: Performed by: EMERGENCY MEDICINE

## 2021-06-18 PROCEDURE — 96374 THER/PROPH/DIAG INJ IV PUSH: CPT

## 2021-06-18 PROCEDURE — 2140000000 HC CCU INTERMEDIATE R&B

## 2021-06-18 PROCEDURE — 6360000002 HC RX W HCPCS: Performed by: HOSPITALIST

## 2021-06-18 PROCEDURE — 93005 ELECTROCARDIOGRAM TRACING: CPT | Performed by: EMERGENCY MEDICINE

## 2021-06-18 PROCEDURE — 96365 THER/PROPH/DIAG IV INF INIT: CPT

## 2021-06-18 PROCEDURE — 80053 COMPREHEN METABOLIC PANEL: CPT

## 2021-06-18 PROCEDURE — 6360000002 HC RX W HCPCS: Performed by: EMERGENCY MEDICINE

## 2021-06-18 PROCEDURE — 6370000000 HC RX 637 (ALT 250 FOR IP): Performed by: HOSPITALIST

## 2021-06-18 PROCEDURE — 83880 ASSAY OF NATRIURETIC PEPTIDE: CPT

## 2021-06-18 PROCEDURE — 85379 FIBRIN DEGRADATION QUANT: CPT

## 2021-06-18 PROCEDURE — 85025 COMPLETE CBC W/AUTO DIFF WBC: CPT

## 2021-06-18 PROCEDURE — 96366 THER/PROPH/DIAG IV INF ADDON: CPT

## 2021-06-18 RX ORDER — POTASSIUM CHLORIDE 20 MEQ/1
40 TABLET, EXTENDED RELEASE ORAL PRN
Status: DISCONTINUED | OUTPATIENT
Start: 2021-06-18 | End: 2021-06-19 | Stop reason: HOSPADM

## 2021-06-18 RX ORDER — SODIUM CHLORIDE 0.9 % (FLUSH) 0.9 %
10 SYRINGE (ML) INJECTION PRN
Status: DISCONTINUED | OUTPATIENT
Start: 2021-06-18 | End: 2021-06-19 | Stop reason: HOSPADM

## 2021-06-18 RX ORDER — ATORVASTATIN CALCIUM 80 MG/1
80 TABLET, FILM COATED ORAL NIGHTLY
Status: DISCONTINUED | OUTPATIENT
Start: 2021-06-18 | End: 2021-06-19 | Stop reason: HOSPADM

## 2021-06-18 RX ORDER — ONDANSETRON 4 MG/1
4 TABLET, ORALLY DISINTEGRATING ORAL EVERY 8 HOURS PRN
Status: DISCONTINUED | OUTPATIENT
Start: 2021-06-18 | End: 2021-06-19 | Stop reason: HOSPADM

## 2021-06-18 RX ORDER — HEPARIN SODIUM 1000 [USP'U]/ML
30 INJECTION, SOLUTION INTRAVENOUS; SUBCUTANEOUS PRN
Status: DISCONTINUED | OUTPATIENT
Start: 2021-06-18 | End: 2021-06-19 | Stop reason: HOSPADM

## 2021-06-18 RX ORDER — ACETAMINOPHEN 325 MG/1
650 TABLET ORAL EVERY 6 HOURS PRN
Status: DISCONTINUED | OUTPATIENT
Start: 2021-06-18 | End: 2021-06-19 | Stop reason: HOSPADM

## 2021-06-18 RX ORDER — ONDANSETRON 2 MG/ML
4 INJECTION INTRAMUSCULAR; INTRAVENOUS EVERY 6 HOURS PRN
Status: DISCONTINUED | OUTPATIENT
Start: 2021-06-18 | End: 2021-06-19 | Stop reason: HOSPADM

## 2021-06-18 RX ORDER — SODIUM CHLORIDE 9 MG/ML
25 INJECTION, SOLUTION INTRAVENOUS PRN
Status: DISCONTINUED | OUTPATIENT
Start: 2021-06-18 | End: 2021-06-19 | Stop reason: HOSPADM

## 2021-06-18 RX ORDER — BUPRENORPHINE HYDROCHLORIDE AND NALOXONE HYDROCHLORIDE DIHYDRATE 2; .5 MG/1; MG/1
2 TABLET SUBLINGUAL DAILY
Status: DISCONTINUED | OUTPATIENT
Start: 2021-06-18 | End: 2021-06-19 | Stop reason: HOSPADM

## 2021-06-18 RX ORDER — HEPARIN SODIUM 10000 [USP'U]/100ML
223-1000 INJECTION, SOLUTION INTRAVENOUS CONTINUOUS
Status: DISCONTINUED | OUTPATIENT
Start: 2021-06-18 | End: 2021-06-19 | Stop reason: HOSPADM

## 2021-06-18 RX ORDER — NITROGLYCERIN 20 MG/100ML
5-200 INJECTION INTRAVENOUS CONTINUOUS
Status: DISCONTINUED | OUTPATIENT
Start: 2021-06-18 | End: 2021-06-19 | Stop reason: HOSPADM

## 2021-06-18 RX ORDER — LORAZEPAM 2 MG/ML
1 INJECTION INTRAMUSCULAR ONCE
Status: COMPLETED | OUTPATIENT
Start: 2021-06-18 | End: 2021-06-18

## 2021-06-18 RX ORDER — ALENDRONATE SODIUM 70 MG/1
70 TABLET ORAL WEEKLY
Status: DISCONTINUED | OUTPATIENT
Start: 2021-06-18 | End: 2021-06-18

## 2021-06-18 RX ORDER — SODIUM CHLORIDE 0.9 % (FLUSH) 0.9 %
10 SYRINGE (ML) INJECTION EVERY 12 HOURS SCHEDULED
Status: DISCONTINUED | OUTPATIENT
Start: 2021-06-18 | End: 2021-06-19 | Stop reason: HOSPADM

## 2021-06-18 RX ORDER — ACETAMINOPHEN 650 MG/1
650 SUPPOSITORY RECTAL EVERY 6 HOURS PRN
Status: DISCONTINUED | OUTPATIENT
Start: 2021-06-18 | End: 2021-06-19 | Stop reason: HOSPADM

## 2021-06-18 RX ORDER — POLYETHYLENE GLYCOL 3350 17 G/17G
17 POWDER, FOR SOLUTION ORAL DAILY
Status: DISCONTINUED | OUTPATIENT
Start: 2021-06-18 | End: 2021-06-19 | Stop reason: HOSPADM

## 2021-06-18 RX ORDER — MAGNESIUM SULFATE 1 G/100ML
1000 INJECTION INTRAVENOUS PRN
Status: DISCONTINUED | OUTPATIENT
Start: 2021-06-18 | End: 2021-06-19 | Stop reason: HOSPADM

## 2021-06-18 RX ORDER — HEPARIN SODIUM 1000 [USP'U]/ML
60 INJECTION, SOLUTION INTRAVENOUS; SUBCUTANEOUS PRN
Status: DISCONTINUED | OUTPATIENT
Start: 2021-06-18 | End: 2021-06-19 | Stop reason: HOSPADM

## 2021-06-18 RX ORDER — HEPARIN SODIUM 1000 [USP'U]/ML
60 INJECTION, SOLUTION INTRAVENOUS; SUBCUTANEOUS ONCE
Status: COMPLETED | OUTPATIENT
Start: 2021-06-18 | End: 2021-06-18

## 2021-06-18 RX ORDER — NITROGLYCERIN 0.4 MG/1
0.4 TABLET SUBLINGUAL EVERY 5 MIN PRN
Status: DISCONTINUED | OUTPATIENT
Start: 2021-06-18 | End: 2021-06-19 | Stop reason: HOSPADM

## 2021-06-18 RX ORDER — BUPRENORPHINE HYDROCHLORIDE AND NALOXONE HYDROCHLORIDE DIHYDRATE 8; 2 MG/1; MG/1
1 TABLET SUBLINGUAL DAILY
Status: DISCONTINUED | OUTPATIENT
Start: 2021-06-18 | End: 2021-06-19 | Stop reason: HOSPADM

## 2021-06-18 RX ORDER — ZOLPIDEM TARTRATE 5 MG/1
5 TABLET ORAL NIGHTLY
Status: DISCONTINUED | OUTPATIENT
Start: 2021-06-18 | End: 2021-06-19 | Stop reason: HOSPADM

## 2021-06-18 RX ORDER — PANTOPRAZOLE SODIUM 40 MG/1
40 TABLET, DELAYED RELEASE ORAL
Status: DISCONTINUED | OUTPATIENT
Start: 2021-06-19 | End: 2021-06-19 | Stop reason: HOSPADM

## 2021-06-18 RX ORDER — POLYETHYLENE GLYCOL 3350 17 G/17G
17 POWDER, FOR SOLUTION ORAL DAILY PRN
Status: DISCONTINUED | OUTPATIENT
Start: 2021-06-18 | End: 2021-06-19 | Stop reason: HOSPADM

## 2021-06-18 RX ORDER — CLONAZEPAM 0.5 MG/1
0.5 TABLET ORAL 2 TIMES DAILY
Status: DISCONTINUED | OUTPATIENT
Start: 2021-06-18 | End: 2021-06-19 | Stop reason: HOSPADM

## 2021-06-18 RX ORDER — POTASSIUM CHLORIDE 7.45 MG/ML
10 INJECTION INTRAVENOUS PRN
Status: DISCONTINUED | OUTPATIENT
Start: 2021-06-18 | End: 2021-06-19 | Stop reason: HOSPADM

## 2021-06-18 RX ORDER — NICOTINE 21 MG/24HR
1 PATCH, TRANSDERMAL 24 HOURS TRANSDERMAL DAILY
Status: DISCONTINUED | OUTPATIENT
Start: 2021-06-18 | End: 2021-06-19 | Stop reason: HOSPADM

## 2021-06-18 RX ADMIN — HEPARIN SODIUM 2670 UNITS: 1000 INJECTION INTRAVENOUS; SUBCUTANEOUS at 11:09

## 2021-06-18 RX ADMIN — LORAZEPAM 1 MG: 2 INJECTION INTRAMUSCULAR; INTRAVENOUS at 10:10

## 2021-06-18 RX ADMIN — ACETAMINOPHEN 650 MG: 325 TABLET ORAL at 15:57

## 2021-06-18 RX ADMIN — NITROGLYCERIN 5 MCG/MIN: 20 INJECTION INTRAVENOUS at 11:08

## 2021-06-18 RX ADMIN — BUPRENORPHINE AND NALOXONE 1 TABLET: 8; 2 TABLET SUBLINGUAL at 19:01

## 2021-06-18 RX ADMIN — BUPRENORPHINE AND NALOXONE 2 TABLET: 2; .5 TABLET SUBLINGUAL at 19:01

## 2021-06-18 RX ADMIN — CLONAZEPAM 0.5 MG: 0.5 TABLET ORAL at 15:58

## 2021-06-18 RX ADMIN — ATORVASTATIN CALCIUM 80 MG: 80 TABLET, FILM COATED ORAL at 21:35

## 2021-06-18 RX ADMIN — HEPARIN SODIUM 12 UNITS/KG/HR: 10000 INJECTION, SOLUTION INTRAVENOUS at 11:09

## 2021-06-18 RX ADMIN — CLONAZEPAM 0.5 MG: 0.5 TABLET ORAL at 21:35

## 2021-06-18 RX ADMIN — ZOLPIDEM TARTRATE 5 MG: 5 TABLET ORAL at 21:35

## 2021-06-18 ASSESSMENT — ENCOUNTER SYMPTOMS
EYE DISCHARGE: 0
COUGH: 0
COLOR CHANGE: 0
GASTROINTESTINAL NEGATIVE: 1
PHOTOPHOBIA: 0
BACK PAIN: 0
SHORTNESS OF BREATH: 1
SORE THROAT: 0
CHEST TIGHTNESS: 1
APNEA: 0
ALLERGIC/IMMUNOLOGIC NEGATIVE: 1
ABDOMINAL DISTENTION: 0
EYES NEGATIVE: 1
EYE PAIN: 0
RHINORRHEA: 0
ABDOMINAL PAIN: 0
NAUSEA: 0

## 2021-06-18 ASSESSMENT — PAIN SCALES - GENERAL
PAINLEVEL_OUTOF10: 10
PAINLEVEL_OUTOF10: 4
PAINLEVEL_OUTOF10: 10
PAINLEVEL_OUTOF10: 10

## 2021-06-18 ASSESSMENT — PAIN DESCRIPTION - LOCATION
LOCATION: CHEST
LOCATION: CHEST

## 2021-06-18 ASSESSMENT — PAIN DESCRIPTION - DESCRIPTORS
DESCRIPTORS: PRESSURE
DESCRIPTORS: PRESSURE

## 2021-06-18 NOTE — ED PROVIDER NOTES
[facility]  eMERGENCYdEPARTMENT eNCOUnter      Pt Name: Beverly Kilgore  MRN: [id number]  [place] 1965  Date of evaluation: 6/18/2021  Provider:JAGUAR Maxwell    CHIEF COMPLAINT       Chief Complaint   Patient presents with    Chest Pain     x \"weeks\"    Heartburn         HISTORY OF PRESENT ILLNESS  (Location/Symptom, Timing/Onset, Context/Setting, Quality, Duration, Modifying Factors, Severity.)   Beverly Kilgore is a 50 y.o. female who presents to the emergency department with complaints of chest/sternal pain intermittent x \"weeks\" she has hx of heart burn describing this more as a pressure. She is short of breath no tachycardia she denies a pleuritic component 94% room air baseline. Rates pain is 10 out of 10 she states intermittently she will also have left arm pain states \"feels like it is falling asleep even at times\" she denies any neck or jaw pain no headache. She is taken different medications this morning prior to coming to ED for heartburn including Tums Rolaids that have not helped with her pain. She is laying in the bed denies orthopnea. Denies edema of the lower extremities. Has been confirming mental status baseline. Hives allergy to aspirin. States the last cardiac work-up she had was in 2003. Does not look like a thorough cardiac history in our system here. I see if you EKGs from earlier this year. HPI    Nursing Notes were reviewed and I agree. REVIEW OF SYSTEMS    (2-9 systems for level 4, 10 or more for level 5)     Review of Systems   Constitutional: Negative for activity change, appetite change, chills and fever. HENT: Negative for congestion, postnasal drip, rhinorrhea and sore throat. Eyes: Negative for photophobia, pain, discharge and visual disturbance. Respiratory: Positive for shortness of breath. Negative for apnea and cough. Cardiovascular: Positive for chest pain. Negative for leg swelling.    Gastrointestinal: Negative for abdominal distention, abdominal pain and nausea. Genitourinary: Negative for vaginal bleeding. Musculoskeletal: Negative for arthralgias, back pain, joint swelling, neck pain and neck stiffness. Skin: Negative for color change and rash. Neurological: Negative for dizziness, syncope, facial asymmetry and headaches. Hematological: Negative for adenopathy. Does not bruise/bleed easily. Psychiatric/Behavioral: Negative for agitation, behavioral problems and confusion. Except as noted above the remainder of the review of systems was reviewed and negative. PAST MEDICAL HISTORY     Past Medical History:   Diagnosis Date    COPD (chronic obstructive pulmonary disease) ([address]     GERD (gastroesophageal reflux disease)     History of MI (myocardial infarction) 2003    History of seizure     years ago    Scoliosis          SURGICAL HISTORY       Past Surgical History:   Procedure Laterality Date    COLONOSCOPY N/A 02/11/2021    Dr Judi Topete-Incomplete due due to poor prep    ENDOSCOPIC ULTRASOUND (LOWER) N/A 01/12/2021    Dr Judi Topete-Common bile duct dilation of unclear etiology, questionably due to chronic opioid use, gastritis    TONSILLECTOMY      UPPER GASTROINTESTINAL ENDOSCOPY  01/12/2021    Dr Judi Topete-w/EUS         CURRENT MEDICATIONS       Current Discharge Medication List      CONTINUE these medications which have NOT CHANGED    Details   ALBUTEROL IN Inhale into the lungs      buprenorphine-naloxone (SUBOXONE) 8-2 MG SUBL SL tablet DISSOLVE 1 AND 1/2 TABLET UNDER THE TONGUE ONCE DAILY FOR 5 DAYS      pantoprazole (PROTONIX) 40 MG tablet Take 1 tablet by mouth every morning (before breakfast)  Qty: 30 tablet, Refills: 2      zolpidem (AMBIEN) 10 MG tablet Take 5-10 mg by mouth nightly. clonazePAM (KLONOPIN) 0.5 MG tablet Take 1 mg by mouth 2 times daily as needed.        alendronate (FOSAMAX) 70 MG tablet Take 70 mg by mouth once a week      polyethylene glycol (MIRALAX) 17 g packet Take 17 g by mouth daily  Qty: 527 g, Refills: 1             ALLERGIES     Aspirin, Ibuprofen, Iodine, and Sulfa antibiotics    FAMILY HISTORY       Family History   Problem Relation Age of Onset    Colon Cancer Neg Hx     Colon Polyps Neg Hx           SOCIAL HISTORY       Social History     Socioeconomic History    Marital status:      Spouse name: None    Number of children: None    Years of education: None    Highest education level: None   Occupational History    None   Tobacco Use    Smoking status: Current Every Day Smoker     Packs/day: 1.00     Types: Cigarettes    Smokeless tobacco: Never Used   Vaping Use    Vaping Use: Never used   Substance and Sexual Activity    Alcohol use: Never    Drug use: Never    Sexual activity: None   Other Topics Concern    None   Social History Narrative    None     Social Determinants of Health     Financial Resource Strain:     Difficulty of Paying Living Expenses:    Food Insecurity:     Worried About Running Out of Food in the Last Year:     Ran Out of Food in the Last Year:    Transportation Needs:     Lack of Transportation (Medical):      Lack of Transportation (Non-Medical):    Physical Activity:     Days of Exercise per Week:     Minutes of Exercise per Session:    Stress:     Feeling of Stress :    Social Connections:     Frequency of Communication with Friends and Family:     Frequency of Social Gatherings with Friends and Family:     Attends Hinduism Services:     Active Member of Clubs or Organizations:     Attends Club or Organization Meetings:     Marital Status:    Intimate Partner Violence:     Fear of Current or Ex-Partner:     Emotionally Abused:     Physically Abused:     Sexually Abused:        SCREENINGS           PHYSICAL EXAM    (up to 7 forlevel 4, 8 or more for level 5)     ED Triage Vitals [06/18/21 0923]   BP Temp Temp Source Pulse Resp SpO2 Height Weight   125/82 97.8 °F (36.6 °C) Oral 80 14 94 % 5' 1\" (1.549 m) 98 lb findings:      Interpretation per the Radiologist below, if available at the time of this note:    XR CHEST PORTABLE   Final Result   Portable chest x-ray demonstrates no active disease. Signed by Dr Luther Castro    (Results Pending)       LABS:  Labs Reviewed   CBC WITH AUTO DIFFERENTIAL - Abnormal; Notable for the following components:       Result Value    RBC 5.59 (*)     Hematocrit 48.7 (*)     MCHC 32.4 (*)     Platelets 271 (*)     MPV 8.0 (*)     Neutrophils % 74.0 (*)     Lymphocytes % 19.0 (*)     All other components within normal limits   COMPREHENSIVE METABOLIC PANEL - Abnormal; Notable for the following components:    Sodium 135 (*)     Chloride 96 (*)     CO2 30 (*)     Glucose 114 (*)     Alkaline Phosphatase 108 (*)     All other components within normal limits   APTT - Abnormal; Notable for the following components:    aPTT 55.3 (*)     All other components within normal limits    Narrative:     Collection has been rescheduled by [facility] at 06/18/2021 16:29 Reason:   Failed attempt at venipuncture   COVID-19, RAPID   TROPONIN   BRAIN NATRIURETIC PEPTIDE   APTT   D-DIMER, QUANTITATIVE   TROPONIN    Narrative:     Collection has been rescheduled by [facility] at 06/18/2021 15:30 Reason:   Patient refuse venipuncture  Collection has been rescheduled by [facility] at 06/18/2021 15:47 Reason: itzel florian notified  Collection has been rescheduled by [facility] at 06/18/2021 16:29 Reason:   Failed attempt at venipuncture   APTT   TROPONIN   APTT   LIPID PANEL   CBC WITH AUTO DIFFERENTIAL   BASIC METABOLIC PANEL   MAGNESIUM   PHOSPHORUS       All other labs were within normal range or notreturned as of this dictation.     RE-ASSESSMENT          EMERGENCY DEPARTMENT COURSE and DIFFERENTIAL DIAGNOSIS/MDM:   Vitals:    Vitals:    06/18/21 1200 06/18/21 1215 06/18/21 1415 06/18/21 1838   BP: 118/76 107/77 117/76 113/71   Pulse: 76 114 116 78   Resp: 19 20 18 16   Temp: 98.2 °F (36.8 °C) 97.6 °F (36.4 °C) 98.6 °F (37 °C) 99 °F (37.2 °C)   TempSrc:  Temporal Temporal Temporal   SpO2: 97% 95% 95% 92%   Weight:  98 lb 9.6 oz (44.7 kg)     Height:  5' 1\" (1.549 m)           MDM  My attending is seen separate from myself the patient has findings concerning for cardiogenic etiology Dr. Maria A Addison will take over care of this patient has placed consult to cardiology for further work-up initially has no specific findings but rather nonspecific findings subtle depression in a couple of the leads my attending agrees. Her pain is 0 out of 10 prior to floor after getting some Ativan and nitro. With her having a heart attack in 2003 and having had no issues in over a decade and medications for typical reflux causing no relief feel appropriate for admission. Heart score is 4 moderate risk category warranting admission. PROCEDURES:    Procedures      FINAL IMPRESSION      1. Acute chest pain          DISPOSITION/PLAN   DISPOSITION Admitted 06/18/2021 11:03:11 AM      PATIENT REFERRED TO:  No follow-up provider specified.     DISCHARGE MEDICATIONS:  Current Discharge Medication List          (Please note that portions of this note were completed with a voice recognition program.  Efforts were made to edit the dictations but occasionallywords are mis-transcribed.)    Mike Espinoza [address], [place]  06/18/21 3772

## 2021-06-18 NOTE — PROGRESS NOTES
Gustavo Higuera arrived to room # [id number]. Presented with: chest pain    Mental Status: Patient is oriented, alert, coherent, logical, thought processes intact and able to concentrate and follow conversation. Vitals:    06/18/21 1200   BP: 118/76   Pulse: 76   Resp: 19   Temp: 98.2 °F (36.8 °C)   SpO2: 97%     Patient safety contract and falls prevention contract reviewed with patient yes. Oriented Patient to room. Call light within reach. Yes.   Needs, issues or concerns expressed at this time: no.      Electronically signed by Benjamin Monaco on 6/18/2021 at 12:22 PM

## 2021-06-18 NOTE — PROGRESS NOTES
4 Eyes Skin Assessment    Timmy Serna is being assessed upon: Admission    I agree that I, Steve Winters, along with *** (either 2 RN's or 1 LPN and 1 RN) have performed a thorough Head to Toe Skin Assessment on the patient. ALL assessment sites listed below have been assessed. Areas assessed by both nurses:     [x]   Head, Face, and Ears   [x]   Shoulders, Back, and Chest  [x]   Arms, Elbows, and Hands   [x]   Coccyx, Sacrum, and Ischium  [x]   Legs, Feet, and Heels    Does the Patient have Skin Breakdown?  No    Chirag Prevention initiated: No  Wound Care Orders initiated: No    [facility] nurse consulted for Pressure Injury (Stage 3,4, Unstageable, DTI, NWPT, and Complex wounds) and New or Established Ostomies: No        Primary Nurse eSignature: Steve Winters on 6/18/2021 at 12:23 PM      Co-Signer eSignature: {Esignature:[id number]}

## 2021-06-18 NOTE — CONSULTS
[facility]  Cardiology Consult      Requesting MD:  Concetta Moore MD   Admit Status:  Inpatient [101]       History obtained from:   [] Patient  [] Other (specify):     Patient:  Pauly Rizzo  [id number]     Chief Complaint:   Chief Complaint   Patient presents with    Chest Pain     x \"weeks\"    Heartburn         HPI:  Pauly Rizzo is an 50 y.o. female. She is a very unfortunate lady with a prior history of CAD and remote MI in 2003 where she was told she had a scar tissue in the heart. She is a chronic smoker and still smokes up to 1-1/2 pack of cigarettes on a daily basis for many years. She is complaining of intermittent chest pain for the last few weeks which got worse to the point that she got scared and asked her father to bring her to the ER for evaluation. On presentation to the ER, she was complaining of chest pain 7-8 out of 10 located in the anterior chest radiating to the left arm all the way down to the fingertips causing numbness of the left arm, associated with palpitations, sweating and shortness of breath. Pt has smoking hsitory    Has chest wall tenderness. Review of Systems:  Review of Systems   Constitutional: Negative. HENT: Negative. Eyes: Negative. Respiratory: Positive for chest tightness and shortness of breath. Cardiovascular: Positive for chest pain. Gastrointestinal: Negative. Endocrine: Negative. Genitourinary: Negative. Musculoskeletal: Negative. Allergic/Immunologic: Negative. Neurological: Negative. Hematological: Negative. Psychiatric/Behavioral: Negative. All other systems reviewed and are negative.       Cardiac Specific Data:  Specialty Problems        Cardiology Problems    CAD (coronary artery disease)        Old MI (myocardial infarction)        * (Principal) Unstable angina pectoris (HCC)              Past Medical History:  Past Medical History:   Diagnosis Date    COPD [facility]chronic obstructive pulmonary disease) ([facility]     GERD (gastroesophageal reflux disease)     History of MI (myocardial infarction) 2003    History of seizure     years ago    Scoliosis         Past Surgical History:  Past Surgical History:   Procedure Laterality Date    COLONOSCOPY N/A 02/11/2021    Dr Mauro Topete-Incomplete due due to poor prep    ENDOSCOPIC ULTRASOUND (LOWER) N/A 01/12/2021    Dr Mauro Topete-Common bile duct dilation of unclear etiology, questionably due to chronic opioid use, gastritis    TONSILLECTOMY      UPPER GASTROINTESTINAL ENDOSCOPY  01/12/2021    Dr Mauro Topete-w/EUS       Past Family History:  Family History   Problem Relation Age of Onset    Colon Cancer Neg Hx     Colon Polyps Neg Hx        Past Social History:  Social History     Socioeconomic History    Marital status:      Spouse name: Not on file    Number of children: Not on file    Years of education: Not on file    Highest education level: Not on file   Occupational History    Not on file   Tobacco Use    Smoking status: Current Every Day Smoker     Packs/day: 1.00     Types: Cigarettes    Smokeless tobacco: Never Used   Vaping Use    Vaping Use: Never used   Substance and Sexual Activity    Alcohol use: Never    Drug use: Never    Sexual activity: Not on file   Other Topics Concern    Not on file   Social History Narrative    Not on file     Social Determinants of Health     Financial Resource Strain:     Difficulty of Paying Living Expenses:    Food Insecurity:     Worried About [facility] in the Last Year:     [facility] in the Last Year:    Transportation Needs:     Lack of Transportation (Medical):      Lack of Transportation (Non-Medical):    Physical Activity:     Days of Exercise per Week:     Minutes of Exercise per Session:    Stress:     Feeling of Stress :    Social Connections:     Frequency of Communication with Friends and Family:     Frequency of Social Gatherings with Friends and Family:     Attends Baptism Services:     Active Member of Clubs or Organizations:     Attends Club or Organization Meetings:     Marital Status:    Intimate Partner Violence:     Fear of Current or Ex-Partner:     Emotionally Abused:     Physically Abused:     Sexually Abused: Allergies: Allergies   Allergen Reactions    Aspirin Hives    Ibuprofen     Iodine     Sulfa Antibiotics        Home Meds:  Prior to Admission medications    Medication Sig Start Date End Date Taking? Authorizing Provider   alendronate (FOSAMAX) 70 MG tablet Take 70 mg by mouth once a week 10/6/20   Historical Provider, MD   polyethylene glycol (MIRALAX) 17 g packet Take 17 g by mouth daily 5/20/21 6/19/21  PERFECTO Soto   ALBUTEROL IN Inhale into the lungs    Historical Provider, MD   buprenorphine-naloxone (SUBOXONE) 8-2 MG SUBL SL tablet DISSOLVE 1 AND 1/2 TABLET UNDER THE TONGUE ONCE DAILY FOR 5 DAYS 12/31/20   Historical Provider, MD   pantoprazole (PROTONIX) 40 MG tablet Take 1 tablet by mouth every morning (before breakfast) 1/22/21   PERFECTO Gore - NP   zolpidem (AMBIEN) 10 MG tablet Take 5-10 mg by mouth nightly. 1/7/21   Historical Provider, MD   clonazePAM (KLONOPIN) 0.5 MG tablet Take 1 mg by mouth 2 times daily as needed. Historical Provider, MD       Current Meds:      Current Infused Meds:   heparin (PORCINE) Infusion 12 Units/kg/hr (06/18/21 1109)    nitroGLYCERIN 5 mcg/min (06/18/21 1108)       Physical Exam:  Vitals:    06/18/21 1215   BP: 107/77   Pulse: 114   Resp: 20   Temp: 97.6 °F (36.4 °C)   SpO2: 95%     No intake or output data in the 24 hours ending 06/18/21 1232  Estimated body mass index is 18.63 kg/m² as calculated from the following:    Height as of this encounter: 5' 1\" (1.549 m). Weight as of this encounter: 98 lb 9.6 oz (44.7 kg). Physical Exam  Vitals reviewed. Constitutional:       Appearance: She is normal weight. HENT:      Head: Normocephalic.       Right Ear: Tympanic membrane normal.      Nose: Nose normal.      Mouth/Throat:      Mouth: Mucous membranes are moist.   Eyes:      Pupils: Pupils are equal, round, and reactive to light. Cardiovascular:      Rate and Rhythm: Normal rate and regular rhythm. Pulses: Normal pulses. Heart sounds: Normal heart sounds. Pulmonary:      Effort: Pulmonary effort is normal.      Breath sounds: Normal breath sounds. Chest:      Chest wall: Tenderness present. Abdominal:      General: Abdomen is flat. Musculoskeletal:         General: Normal range of motion. Cervical back: Normal range of motion and neck supple. Skin:     General: Skin is warm. Neurological:      General: No focal deficit present. Psychiatric:         Mood and Affect: Mood normal.         Labs:  Recent Labs     06/18/21  0934   WBC 9.4   HGB 15.8   *       Recent Labs     06/18/21  0934   *   K 3.9   CL 96*   CO2 30*   BUN 14   CREATININE 0.7   LABGLOM >60   CALCIUM 10.0       CK, CKMB, Troponin: @LABRCNT (CKTOTAL:3, CKMB:3, TROPONINI:3)@    Last 3 BNP:  No results for input(s): BNP in the last 72 hours. IMAGING:  ECHO Complete 2D W Doppler W Color    Result Date: 6/18/2021  Transthoracic Echocardiography Report (TTE)  Demographics   Patient Name  Ama Forte Date of Study          06/18/2021   MRN           [id number]         Gender                 Female   Date of Birth 1965     Room Number            [facility]   Age           50 year(s)   Height:       61 inches      Referring Physician    Martha Young MD   Weight:       98 pounds      Sonographer            BRENNAN Vides   BSA:          1.4 m^2        Interpreting Physician Natalia Maradiaga MD   BMI:          18.52 kg/m^2  Procedure Type of Study   TTE procedure:ECHO NO CONTRAST WITH DOP/COLR. Study Location: Echo Lab Technical Quality: Poor visualization due to body habitus. Patient Status: STAT BP: 132/80 mmHg Indications:Chest pain.   Conclusions   Summary Structurally normal mitral valve with normal leaflet mobility. No evidence  of mitral valve stenosis or mild mitral regurgitation. Aortic valve appears to be tricuspid. Structurally normal aortic valve. No significant aortic regurgitation or stenosis is noted. Tricuspid valve is structurally normal.  No evidence of tricuspid regurgitation. Normal size left atrium. Normal left ventricular size with preserved LV function and an estimated  ejection fraction of approximately 55-60%. No evidence of left ventricular mass or thrombus noted. Normal right atrial dimension with no evidence of thrombus or mass noted. RV enlarged. Signature   ----------------------------------------------------------------  Electronically signed by Gely Palmer MD(Interpreting  physician) on 06/18/2021 12:09 PM  ----------------------------------------------------------------   Findings   Mitral Valve  Structurally normal mitral valve with normal leaflet mobility. No evidence  of mitral valve stenosis or mild mitral regurgitation. Aortic Valve  Aortic valve appears to be tricuspid. Structurally normal aortic valve. No significant aortic regurgitation or stenosis is noted. Tricuspid Valve  Tricuspid valve is structurally normal.  No evidence of tricuspid regurgitation. Left Atrium  Normal size left atrium. Left Ventricle  Normal left ventricular size with preserved LV function and an estimated  ejection fraction of approximately 55-60%. No evidence of left ventricular mass or thrombus noted. Right Atrium  Normal right atrial dimension with no evidence of thrombus or mass noted. Right Ventricle  RV enlarged. Pericardial Effusion  No evidence of significant pericardial effusion is noted. M-Mode Measurements (cm)   LVIDd: 3.02 cm                       LVIDs: 1.93 cm  IVSd: 0.71 cm  LVPWd: 0.88 cm                       AO Root Dimension: 1.5 cm  % Ejection Fraction: 67 %            LA:  1.1 cm LVOT: 1.5 cm  Doppler Measurements:   AV Peak Velocity:107 cm/s          MV Peak E-Wave: 64.5 cm/s  AV Peak Gradient: 4.58 mmHg        MV Peak A-Wave: 51.4 cm/s                                     MV E/A Ratio: 1.25 %  TR Velocity:206 cm/s               MV Peak Gradient: 1.66 mmHg  TR Gradient:16.97 mmHg             MV P1/2t: 35 msec  Estimated RAP:5 mmHg               MVA by PHT6.29 cm^2  RVSP:22 mmHg      CT ABDOMEN PELVIS WO CONTRAST Additional Contrast? None    Result Date: 5/20/2021  CT ABDOMEN PELVIS WO CONTRAST 5/20/2021 5:08 PM HISTORY: Palpable knot in upper abdomen. Abdominal distention. COMPARISON: 1/10/2021 DLP: 617 mGy cm. Automated exposure control was utilized to diminish patient radiation dose. TECHNIQUE: Noncontrast enhanced images of the abdomen and pelvis obtained without oral contrast. FINDINGS: The lung bases and base of the heart are unremarkable. LIVER: No focal liver lesion. BILIARY SYSTEM: The gallbladder is contracted. There is mild extrahepatic biliary dilatation stable in appearance and size from the previous exam. I do not see evidence of a discrete distal ductal stone to suggest choledocholithiasis. Marnell Records PANCREAS: The pancreatic duct is mildly prominent. This is stable from the previous examination. There is limited evaluation of the pancreatic head without intravenous or oral contrast administration. . SPLEEN: Unremarkable. KIDNEYS AND ADRENALS: Bilateral kidneys and adrenal glands are unremarkable. The ureters are decompressed and normal in appearance. RETROPERITONEUM: No mass, lymphadenopathy or hemorrhage. GI TRACT: There is moderate constipation. There is no evidence of mechanical bowel obstruction. . The appendix is visualized and unremarkable. OTHER: There is no mesenteric mass, lymphadenopathy or fluid collection. The osseous structures and soft tissues demonstrate no worrisome lesions. No free fluid is identified. A tiny fat-containing periumbilical hernia is present. PELVIS: The uterus and adnexa are unremarkable. There are phleboliths within the pelvis. . The urinary bladder is normal in appearance. 1. Moderate constipation. There is no obstruction or free air. The appendix is identified and is normal in appearance. 2. No evidence of nephrolithiasis or obstructive uropathy. 3. Mild extrahepatic biliary dilatation similar in appearance to the previous exam. The pancreatic duct is also mildly prominent. I do not see a definite discrete pancreatic head mass or evidence of choledocholithiasis but the pancreas is limited for assessment secondary to lack of IV and oral contrast administration. I would suggest that in the future if the patient is due undergo CT imaging that she obtain both IV and oral contrast secondary to her lack of intra-abdominal fat. 4. Tiny fat-containing periumbilical hernia. 5. The uterus and adnexa are unremarkable. The urinary bladder is normal in appearance. . Signed by Dr Jewel Duckworth on 5/20/2021 7:22 PM    XR CHEST PORTABLE    Result Date: 6/18/2021  EXAMINATION:  XR CHEST PORTABLE  6/18/2021 11:01 AM HISTORY: Chest pain. COMPARISON: 5/20/2021. FINDINGS:  The lungs are expanded and clear. The heart size is normal. No acute bony abnormality is seen. Portable chest x-ray demonstrates no active disease. Signed by Dr Nataly Pineda    XR CHEST PORTABLE    Result Date: 5/20/2021  EXAMINATION: Chest one view 5/20/2021 HISTORY: Increasing shortness of breath from baseline COPD symptoms. FINDINGS: Today's exam is compared to previous study dated 1/10/2021. There are emphysematous changes of the lungs with hyperinflation of the lung parenchyma. No evidence of acute consolidative pneumonia or effusion. The mediastinal contours are within normal limits. 1.. Emphysematous changes of the lungs with hyperinflation of the lung parenchyma. 2. Otherwise normal chest. Signed by Dr Jewel Duckworth on 5/20/2021 5:23 PM      Assessment:  1.  Atypical chest pain  2. Smoker  3. Family history of CAD  4. Abn d dimer  5. Normal EF  6. RV enlarged.       Recommendations:    CTA chest r/o PE  Christina scan tomorrow    Fu cardiology as OP    No smoking

## 2021-06-18 NOTE — H&P
N/A 01/12/2021    Dr Romie Topete-Common bile duct dilation of unclear etiology, questionably due to chronic opioid use, gastritis    TONSILLECTOMY      UPPER GASTROINTESTINAL ENDOSCOPY  01/12/2021    Dr Romie Topete-w/EUS        SOCIAL HISTORY:  Social History     Socioeconomic History    Marital status:      Spouse name: None    Number of children: None    Years of education: None    Highest education level: None   Occupational History    None   Tobacco Use    Smoking status: Current Every Day Smoker     Packs/day: 1.00     Types: Cigarettes    Smokeless tobacco: Never Used   Vaping Use    Vaping Use: Never used   Substance and Sexual Activity    Alcohol use: Never    Drug use: Never    Sexual activity: None   Other Topics Concern    None   Social History Narrative    None     Social Determinants of Health     Financial Resource Strain:     Difficulty of Paying Living Expenses:    Food Insecurity:     Worried About Running Out of Food in the Last Year:     Ran Out of Food in the Last Year:    Transportation Needs:     Lack of Transportation (Medical):      Lack of Transportation (Non-Medical):    Physical Activity:     Days of Exercise per Week:     Minutes of Exercise per Session:    Stress:     Feeling of Stress :    Social Connections:     Frequency of Communication with Friends and Family:     Frequency of Social Gatherings with Friends and Family:     Attends [employer] Services:     Active Member of Clubs or Organizations:     Attends Club or Organization Meetings:     Marital Status:    Intimate Partner Violence:     Fear of Current or Ex-Partner:     Emotionally Abused:     Physically Abused:     Sexually Abused:         FAMILY HISTORY:  Family History   Problem Relation Age of Onset    Colon Cancer Neg Hx     Colon Polyps Neg Hx          ALLERGIES:  Allergies   Allergen Reactions    Aspirin Hives    Ibuprofen     Iodine     Sulfa Antibiotics         PRIOR TO ADMISSION MEDS:  Not in a hospital admission. REVIEW OF SYSTEMS:  Constitutional:  No fevers, chills, nausea, vomiting, + tiredness & fatigue, + sweating   Head:  No head injury, facial trauma   Eyes:  No acute visual changes, exudate, trauma   Ears:  No acute hearing loss, earaches   Nose: No nasal discharge, epistaxis   Neck: No new hoarseness, voice change, or new masses   Lungs:   No hemoptysis, pleurisy, + SOB   Heart:  + chest pressure with exertion, + palpitations   Abdomen:   No new masses, no bright red blood per rectum   Extremities:  + difficulty ambulation due to weakness, no new lesions   Skin: No new changes in skin color, no rashes or lesions   Neurologic: No new motor or sensory changes, + anxious       PHYSICAL EXAM:  /80   Pulse 78   Temp 97.8 °F (36.6 °C) (Oral)   Resp 16   Ht 5' 1\" (1.549 m)   Wt 98 lb (44.5 kg)   SpO2 96%   BMI 18.52 kg/m²   No intake/output data recorded.       PHYSICAL EXAMINATION:    Vital Signs: Please see the chart   JONEL:  Awake, alert, oriented x 3, patient appears tired and fatigued   Head/Eyes:  Normocephalic, atraumatic, EOMI and PERRLA bilaterally   ENT: Moist mucous membranes, nasal passages clear   Neck: Supple, full range of motion, no carotid bruit, trachea midline   Respiratory:   Bilateral decreased air entry in both lung fields, mild B/L crackles, symmetric expansion of chest   Cardiovascular:  Regular rate and rhythm, S1+S2+0, no murmurs/rubs   Urology: No bilateral CVA tenderness, no suprapubic tenderness   Abdomen:   Soft, non-tender, bowel sounds +ve, no organomegaly   Muscle/Joints: Moves all, decreased range of motion, no muscle spasms   Extremities: No clubbing, no cyanosis, no calf tenderness, no edema   Pulses: 2+ bilaterally, symmetrical   Skin: Warm, dry, no pallor/cyanosis/jaundice, no rashes/lesions   Neurologic: Awake, alert, oriented x 3, cranial nerves II-XII intact, no focal neurological deficits, sensory system intact   Psychiatric: + anxious mood mood, non-suicidal         LABORATORY DATA:    CBC:  Recent Labs     06/18/21  0934   WBC 9.4   HGB 15.8   HCT 48.7*   *     BMP:  Recent Labs     06/18/21  0934   *   K 3.9   CL 96*   CO2 30*   BUN 14   CREATININE 0.7   CALCIUM 10.0     Recent Labs     06/18/21  0934   AST 23   ALT 14   BILITOT 0.4   ALKPHOS 108*     Coag Panel: No results for input(s): INR, PROTIME, APTT in the last 72 hours. Cardiac Enzymes:   Recent Labs     06/18/21  0934   TROPONINI <0.01     ABGs:No results found for: PHART, PO2ART, KKT6XQO  Urinalysis:  Lab Results   Component Value Date    NITRU Negative 05/20/2021    WBCUA 21-30 09/12/2020    BACTERIA 1+ 09/12/2020    RBCUA 2-4 09/12/2020    BLOODU Negative 05/20/2021    SPECGRAV 1.006 05/20/2021    GLUCOSEU Negative 05/20/2021     A1C: No results for input(s): LABA1C in the last 72 hours. ABG:No results for input(s): PHART, BNT7KPG, PO2ART, YQG4YOP, BEART, HGBAE, Z9NEZPDL, CARBOXHGBART in the last 72 hours. EKG:   Please see chart      IMAGING:  XR CHEST PORTABLE    Result Date: 6/18/2021  Portable chest x-ray demonstrates no active disease. Signed by Dr Azam Herrera and Plan:    Principal Problem:    Unstable angina pectoris ([address]  Active Problems:    COPD (chronic obstructive pulmonary disease) (HCC)    Generalized anxiety disorder    Gastritis    CAD (coronary artery disease)    Old MI (myocardial infarction)    Tobacco abuse counseling    Cigarette nicotine dependence with nicotine-induced disorder    Personal history of noncompliance with medical treatment and regimen  Resolved Problems:    * No resolved hospital problems.  *       Admit patient to CCU under full inpatient status  I requested ER physician to start her on IV heparin and IV nitroglycerin drips  ER physician already spoke with cardiologist who will evaluate the patient today  Keep patient n.p.o. except meds  I ordered a stat Lexiscan nuclear stress test today  Patient may be taken by cardiologist for cardiac cath today if she remains symptomatic still with chest pain  Continuous cardiac tele-monitoring  Patient given Aspirin in the ER  Monitor cardiac enzymes ×3  Full 2-D echo with color-flow and doppler ordered in am to evaluate cardiac structure and function  Patient would need close follow-up with cardiology for further work-up while she is in the hospital      Dependence on nicotine from cigarettes          Tobacco abuse counseling  Patient smokes cigarettes on a chronic basis. Strictly advised patient to cut down on or quit smoking. Nicotine patch offered. ~5 minutes spent on tobacco cessation counseling with the patient. Websites - http://smokefree. gov & LegalWarrants.Mesosphere. com   °Quitlines - 1-800-QUIT-NOW (6-952-027-192-063-4244)   °SmCubeyoufrPK Clean Apps - [place]   °Text Messages - SmokefreeTXT (send the word QUIT to 88784)       Personal history of noncompliance with medical treatment and regimen  STRICTLY advised patient to be compliant with meds and medical recommendations  Advised patient to get established with a PCP upon discharge, take care of meds, diet and bring patient's self and life back on track      Repeat labs in a.m. Electrolyte replacement as per protocol. Patient will be monitored very closely on the floor. Further recommendations as per the hospital course. Patient's management will be taken over by our [facility] Hospitalist Team in am.    Patient  is on DVT prophylaxis  Current medications reviewed  Lab work reviewed  Radiology/Chest x-ray films reviewed  Discussed with the nurse and addressed all questions/concerns  Discussed with Patient and/or Family at the bedside in detail . .. they verbalize understanding and agree with the management plan. Attestation:  Inpatient status is used for patients with an expected LOS extending past two midnights due to medical therapy and/or critical care needs  . .. all other patients are placed under OBServation status. Clare Garcia MD  11:14 AM 6/18/2021      DISCLAIMER: This note was created with electronic voice recognition which does have occasional errors. If you have any questions regarding the content within the note please do not hesitate to contact me. .. Thanks.

## 2021-06-18 NOTE — ED PROVIDER NOTES
Attending Supervisory Note/Shared Visit   I have personally performed a face to face diagnostic evaluation on this patient. I have reviewed the mid-levels findings and agree. aSdaf Hicks is a 49 yo F with intermittent cp for past several weeks. Hx of remote MI in 2003 but no stents and told had \"scar tissue\". No recent cardiac work up. Long time smoker. 80 normal sinus rhythm subtle depression noted in lead II III aVF as well as V5 V6, nondiagnostic EKG    ekg concerning, trop neg, d/w Dr. Kathryn Cardoza for admission request heparin and nitro, I d/w  as well, request echo      FINAL IMPRESSION      1.  Acute chest pain          Mayra Milladr MD  Attending Emergency Physician        [address]y [address] MD  06/18/21 [address], [place]  06/18/21 [id number]

## 2021-06-18 NOTE — PROGRESS NOTES
Verified suboxone 8-2 daily prescription with Carin Fleischer at [facility] in [employer]. [phone].        Electronically signed by Srini Álvarez on 6/18/2021 at 4:14 PM

## 2021-06-19 ENCOUNTER — APPOINTMENT (OUTPATIENT)
Dept: NUCLEAR MEDICINE | Age: 56
DRG: 303 | End: 2021-06-19
Payer: MEDICAID

## 2021-06-19 VITALS
WEIGHT: 98.4 LBS | BODY MASS INDEX: 18.58 KG/M2 | HEIGHT: 61 IN | DIASTOLIC BLOOD PRESSURE: 67 MMHG | SYSTOLIC BLOOD PRESSURE: 101 MMHG | OXYGEN SATURATION: 96 % | HEART RATE: 85 BPM | TEMPERATURE: 97.6 F | RESPIRATION RATE: 16 BRPM

## 2021-06-19 PROBLEM — I20.0 UNSTABLE ANGINA PECTORIS (HCC): Status: RESOLVED | Noted: 2021-06-18 | Resolved: 2021-06-19

## 2021-06-19 LAB
ANION GAP SERPL CALCULATED.3IONS-SCNC: 10 MMOL/L (ref 7–19)
APTT: 40.8 SEC (ref 26–36.2)
BASOPHILS ABSOLUTE: 0.1 K/UL (ref 0–0.2)
BASOPHILS RELATIVE PERCENT: 0.6 % (ref 0–1)
BUN BLDV-MCNC: 14 MG/DL (ref 6–20)
CALCIUM SERPL-MCNC: 9.2 MG/DL (ref 8.6–10)
CHLORIDE BLD-SCNC: 100 MMOL/L (ref 98–111)
CHOLESTEROL, TOTAL: 186 MG/DL (ref 160–199)
CO2: 27 MMOL/L (ref 22–29)
CREAT SERPL-MCNC: 0.7 MG/DL (ref 0.5–0.9)
EOSINOPHILS ABSOLUTE: 0.2 K/UL (ref 0–0.6)
EOSINOPHILS RELATIVE PERCENT: 1.6 % (ref 0–5)
GFR AFRICAN AMERICAN: >59
GFR NON-AFRICAN AMERICAN: >60
GLUCOSE BLD-MCNC: 125 MG/DL (ref 74–109)
HCT VFR BLD CALC: 41.8 % (ref 37–47)
HDLC SERPL-MCNC: 93 MG/DL (ref 65–121)
HEMOGLOBIN: 13.5 G/DL (ref 12–16)
IMMATURE GRANULOCYTES #: 0 K/UL
LDL CHOLESTEROL CALCULATED: 80 MG/DL
LV EF: 77 %
LVEF MODALITY: NORMAL
LYMPHOCYTES ABSOLUTE: 3.2 K/UL (ref 1.1–4.5)
LYMPHOCYTES RELATIVE PERCENT: 34.3 % (ref 20–40)
MAGNESIUM: 1.9 MG/DL (ref 1.6–2.6)
MCH RBC QN AUTO: 28.7 PG (ref 27–31)
MCHC RBC AUTO-ENTMCNC: 32.3 G/DL (ref 33–37)
MCV RBC AUTO: 88.7 FL (ref 81–99)
MONOCYTES ABSOLUTE: 0.6 K/UL (ref 0–0.9)
MONOCYTES RELATIVE PERCENT: 6.4 % (ref 0–10)
NEUTROPHILS ABSOLUTE: 5.3 K/UL (ref 1.5–7.5)
NEUTROPHILS RELATIVE PERCENT: 56.9 % (ref 50–65)
PDW BLD-RTO: 12.9 % (ref 11.5–14.5)
PHOSPHORUS: 3.9 MG/DL (ref 2.5–4.5)
PLATELET # BLD: 363 K/UL (ref 130–400)
PMV BLD AUTO: 8.5 FL (ref 9.4–12.3)
POTASSIUM SERPL-SCNC: 3.7 MMOL/L (ref 3.5–5)
RBC # BLD: 4.71 M/UL (ref 4.2–5.4)
SODIUM BLD-SCNC: 137 MMOL/L (ref 136–145)
TRIGL SERPL-MCNC: 63 MG/DL (ref 0–149)
TROPONIN: <0.01 NG/ML (ref 0–0.03)
WBC # BLD: 9.3 K/UL (ref 4.8–10.8)

## 2021-06-19 PROCEDURE — 6360000002 HC RX W HCPCS: Performed by: INTERNAL MEDICINE

## 2021-06-19 PROCEDURE — 84100 ASSAY OF PHOSPHORUS: CPT

## 2021-06-19 PROCEDURE — 84484 ASSAY OF TROPONIN QUANT: CPT

## 2021-06-19 PROCEDURE — 85730 THROMBOPLASTIN TIME PARTIAL: CPT

## 2021-06-19 PROCEDURE — 36415 COLL VENOUS BLD VENIPUNCTURE: CPT

## 2021-06-19 PROCEDURE — A9500 TC99M SESTAMIBI: HCPCS | Performed by: HOSPITALIST

## 2021-06-19 PROCEDURE — 80048 BASIC METABOLIC PNL TOTAL CA: CPT

## 2021-06-19 PROCEDURE — 93005 ELECTROCARDIOGRAM TRACING: CPT | Performed by: HOSPITALIST

## 2021-06-19 PROCEDURE — 3430000000 HC RX DIAGNOSTIC RADIOPHARMACEUTICAL: Performed by: HOSPITALIST

## 2021-06-19 PROCEDURE — G0378 HOSPITAL OBSERVATION PER HR: HCPCS

## 2021-06-19 PROCEDURE — 96366 THER/PROPH/DIAG IV INF ADDON: CPT

## 2021-06-19 PROCEDURE — 6360000002 HC RX W HCPCS: Performed by: HOSPITALIST

## 2021-06-19 PROCEDURE — 85025 COMPLETE CBC W/AUTO DIFF WBC: CPT

## 2021-06-19 PROCEDURE — 93017 CV STRESS TEST TRACING ONLY: CPT

## 2021-06-19 PROCEDURE — 96375 TX/PRO/DX INJ NEW DRUG ADDON: CPT

## 2021-06-19 PROCEDURE — 6370000000 HC RX 637 (ALT 250 FOR IP): Performed by: HOSPITALIST

## 2021-06-19 PROCEDURE — 2580000003 HC RX 258: Performed by: HOSPITALIST

## 2021-06-19 PROCEDURE — 80061 LIPID PANEL: CPT

## 2021-06-19 PROCEDURE — 83735 ASSAY OF MAGNESIUM: CPT

## 2021-06-19 RX ORDER — NITROGLYCERIN 0.4 MG/1
TABLET SUBLINGUAL
Qty: 25 TABLET | Refills: 0 | Status: SHIPPED | OUTPATIENT
Start: 2021-06-19

## 2021-06-19 RX ORDER — ATORVASTATIN CALCIUM 20 MG/1
20 TABLET, FILM COATED ORAL NIGHTLY
Qty: 30 TABLET | Refills: 0 | Status: SHIPPED | OUTPATIENT
Start: 2021-06-19 | End: 2021-07-19

## 2021-06-19 RX ADMIN — CLONAZEPAM 0.5 MG: 0.5 TABLET ORAL at 09:49

## 2021-06-19 RX ADMIN — SODIUM CHLORIDE, PRESERVATIVE FREE 10 ML: 5 INJECTION INTRAVENOUS at 09:49

## 2021-06-19 RX ADMIN — TETRAKIS(2-METHOXYISOBUTYLISOCYANIDE)COPPER(I) TETRAFLUOROBORATE 30 MILLICURIE: 1 INJECTION, POWDER, LYOPHILIZED, FOR SOLUTION INTRAVENOUS at 09:20

## 2021-06-19 RX ADMIN — HEPARIN SODIUM 1340 UNITS: 1000 INJECTION INTRAVENOUS; SUBCUTANEOUS at 01:44

## 2021-06-19 RX ADMIN — BUPRENORPHINE AND NALOXONE 2 TABLET: 2; .5 TABLET SUBLINGUAL at 09:48

## 2021-06-19 RX ADMIN — TETRAKIS(2-METHOXYISOBUTYLISOCYANIDE)COPPER(I) TETRAFLUOROBORATE 10 MILLICURIE: 1 INJECTION, POWDER, LYOPHILIZED, FOR SOLUTION INTRAVENOUS at 09:19

## 2021-06-19 RX ADMIN — REGADENOSON 0.4 MG: 0.08 INJECTION, SOLUTION INTRAVENOUS at 08:50

## 2021-06-19 RX ADMIN — BUPRENORPHINE AND NALOXONE 1 TABLET: 8; 2 TABLET SUBLINGUAL at 09:48

## 2021-06-19 ASSESSMENT — PAIN SCALES - GENERAL
PAINLEVEL_OUTOF10: 7
PAINLEVEL_OUTOF10: 0

## 2021-06-19 NOTE — DISCHARGE SUMMARY
[facility], [address] [place] [address] MEDICINE      DISCHARGE SUMMARY:      PATIENT NAME:  Akosua Beasley  :  1965  MRN:  [id number]    Admission Date:   2021  9:18 AM Attending: Khoa Lugo MD   Discharge Date:   2021              PCP: Jose Salazar  Length of Stay: 1 days     Chief Complaint on Admission:   Chief Complaint   Patient presents with    Chest Pain     x \"weeks\"    Heartburn       Consultants:     IP CONSULT TO CARDIOLOGY       Discharge Problem List:   Principal Problem (Resolved):    Unstable angina pectoris ([address]  Active Problems:    COPD (chronic obstructive pulmonary disease) ([address]    Generalized anxiety disorder    Gastritis    CAD (coronary artery disease)    Old MI (myocardial infarction)    Tobacco abuse counseling    Cigarette nicotine dependence with nicotine-induced disorder    Personal history of noncompliance with medical treatment and regimen      Dependence on nicotine from cigarettes          Tobacco abuse counseling  Patient smokes cigarettes on a chronic basis. Strictly advised patient to cut down on or quit smoking. Nicotine patch offered. ~5 minutes spent on tobacco cessation counseling with the patient. Websites - http://smokefree. gov & LegalWarrants.Cashplay.co   °Quitlines - 1-800-QUIT-NOW (3-583-544-632-189-0093)   °[employer] Apps - QuitSTART Roxana   °Text Messages - SmokefreeTXT (send the word QUIT to 98600)       Personal history of noncompliance with medical treatment and regimen  STRICTLY advised patient to be compliant with meds and medical recommendations  Advised patient to get established with a PCP upon discharge, take care of meds, diet and bring patient's self and life back on track    [address] [facility]:      2021  Patient is feeling better today. Her chest pain has resolved. She had a Lexiscan stress test done which failed to show any evidence of myocardial infarction/ischemia.   She has been 06/18/21  0934 06/19/21  1035   WBC 9.4 9.3   HGB 15.8 13.5   * 363     Recent Labs     06/18/21  0934 06/19/21  1035   * 137   K 3.9 3.7   CL 96* 100   CO2 30* 27   BUN 14 14   CREATININE 0.7 0.7   GLUCOSE 114* 125*     Recent Labs     06/18/21  0934   AST 23   ALT 14   BILITOT 0.4   ALKPHOS 108*     Troponin T:   Recent Labs     06/18/21  0934 06/18/21  1738 06/18/21  2314   TROPONINI <0.01 <0.01 <0.01     Pro-BNP: No results for input(s): BNP in the last 72 hours. INR: No results for input(s): INR in the last 72 hours. UA:No results for input(s): NITRITE, COLORU, PHUR, LABCAST, WBCUA, RBCUA, MUCUS, TRICHOMONAS, YEAST, BACTERIA, CLARITYU, SPECGRAV, LEUKOCYTESUR, UROBILINOGEN, BILIRUBINUR, BLOODU, GLUCOSEU, AMORPHOUS in the last 72 hours. Invalid input(s): Yaya Childress  A1C: No results for input(s): LABA1C in the last 72 hours. ABG:No results for input(s): PHART, IKS0NDX, PO2ART, GHV0SOC, BEART, HGBAE, F6GXXWON, CARBOXHGBART in the last 72 hours. Impressions of imaging performed in 48 hours before discharge:    XR CHEST PORTABLE    Result Date: 6/18/2021  Portable chest x-ray demonstrates no active disease. Signed by Dr Arti Matos, [address] [facility] [address] Medication Instructions [facility]:[id number]    Printed on:06/19/21 1152   Medication Information                      ALBUTEROL IN  Inhale into the lungs             alendronate (FOSAMAX) 70 MG tablet  Take 70 mg by mouth once a week             atorvastatin (LIPITOR) 20 MG tablet  Take 1 tablet by mouth nightly             buprenorphine-naloxone (SUBOXONE) 8-2 MG SUBL SL tablet  DISSOLVE 1 AND 1/2 TABLET UNDER THE TONGUE ONCE DAILY FOR 5 DAYS             clonazePAM (KLONOPIN) 0.5 MG tablet  Take 1 mg by mouth 2 times daily as needed. nitroGLYCERIN (NITROSTAT) 0.4 MG SL tablet  up to max of 3 total doses. If no relief after 1 dose, call 911.              pantoprazole (PROTONIX) 40 MG tablet  Take 1 tablet by mouth every

## 2021-06-19 NOTE — PROGRESS NOTES
Cardiology Daily Note Jonny Ontiveros MD      Patient:  Akosua Beasley  [id number]    Patient Active Problem List    Diagnosis Date Noted    Unstable angina pectoris [facility]) 06/18/2021    CAD (coronary artery disease) 06/18/2021    Old MI (myocardial infarction) 06/18/2021    Tobacco abuse counseling 06/18/2021    Cigarette nicotine dependence with nicotine-induced disorder 06/18/2021    Personal history of noncompliance with medical treatment and regimen 06/18/2021    Gastritis 01/13/2021    Severe malnutrition ([address] 01/11/2021    Generalized anxiety disorder 01/11/2021    Dilation of common bile duct 01/10/2021    COPD (chronic obstructive pulmonary disease) ([address]        Admit Date:  6/18/2021    Admission Problem List: Present on Admission:   Unstable angina pectoris ([address]   COPD (chronic obstructive pulmonary disease) (HCC)   Generalized anxiety disorder   Gastritis   CAD (coronary artery disease)   Old MI (myocardial infarction)   Tobacco abuse counseling   Cigarette nicotine dependence with nicotine-induced disorder   Personal history of noncompliance with medical treatment and regimen      Cardiac Specific Data:  Specialty Problems        Cardiology Problems    CAD (coronary artery disease)        Old MI (myocardial infarction)        * (Principal) Unstable angina pectoris ([address]              Subjective:  Ms. Ramsey Chavarria feels ok. lexiscan normal.    Objective:   BP (!) 97/55   Pulse 89   Temp 98.7 °F (37.1 °C) (Temporal)   Resp 18   Ht 5' 1\" (1.549 m)   Wt 98 lb 6.4 oz (44.6 kg)   SpO2 94%   BMI 18.59 kg/m²     No intake or output data in the 24 hours ending 06/19/21 0954    Prior to Admission medications    Medication Sig Start Date End Date Taking?  Authorizing Provider   ALBUTEROL IN Inhale into the lungs   Yes Historical Provider, MD   buprenorphine-naloxone (SUBOXONE) 8-2 MG SUBL SL tablet DISSOLVE 1 AND 1/2 TABLET UNDER THE TONGUE ONCE DAILY FOR 5 DAYS 12/31/20  Yes Historical Provider, MD   pantoprazole (PROTONIX) 40 MG tablet Take 1 tablet by mouth every morning (before breakfast) 1/22/21  Yes PERFECTO Preston - NP   zolpidem (AMBIEN) 10 MG tablet Take 5-10 mg by mouth nightly. 1/7/21  Yes Historical Provider, MD   clonazePAM (KLONOPIN) 0.5 MG tablet Take 1 mg by mouth 2 times daily as needed. Yes Historical Provider, MD   alendronate (FOSAMAX) 70 MG tablet Take 70 mg by mouth once a week 10/6/20   Historical Provider, MD   polyethylene glycol (MIRALAX) 17 g packet Take 17 g by mouth daily 5/20/21 6/19/21  PERFECTO Abreu        clonazePAM  0.5 mg Oral BID    zolpidem  5 mg Oral Nightly    pantoprazole  40 mg Oral QAM AC    polyethylene glycol  17 g Oral Daily    sodium chloride flush  10 mL Intravenous 2 times per day    atorvastatin  80 mg Oral Nightly    nicotine  1 patch Transdermal Daily    buprenorphine-naloxone  1 tablet Sublingual Daily    And    buprenorphine-naloxone  2 tablet Sublingual Daily       TELEMETRY: Sinus     Physical Exam:      Physical Exam      General:  Awake, alert, NAD  Skin:  Warm and dry  Neck:  no jvd , no carotid bruits  Chest:  Clear to auscultation, no wheezing or rales  Cardiovascular:  RRR I1H0 no murmurs, clicks, gallups, or rubs  Abdomen:  Soft nontender, nondistended, bowel sounds present  Extremities:  Edema: none left Radial Artery withnl palpable pulses; Hematoma: No  Neuro: Intact neurovascular function Yes    Lab Data:  CBC:   Recent Labs     06/18/21  0934   WBC 9.4   HGB 15.8   HCT 48.7*   MCV 87.1   *     BMP:   Recent Labs     06/18/21  0934   *   K 3.9   CL 96*   CO2 30*   BUN 14   CREATININE 0.7     LIVER PROFILE:   Recent Labs     06/18/21  0934   AST 23   ALT 14   BILITOT 0.4   ALKPHOS 108*     PT/INR: No results for input(s): PROTIME, INR in the last 72 hours.   APTT:   Recent Labs     06/18/21  0934 06/18/21  1738 06/18/21  2314   APTT 30.4 55.3* 45.9*     BNP:  No results for input(s): BNP in the last 72 hours. CK, CKMB, Troponin: @LABRCNT (CKTOTAL:3, CKMB:3, TROPONINI:3)@    IMAGING:  ECHO Complete 2D W Doppler W Color    Result Date: 6/18/2021  Transthoracic Echocardiography Report (TTE)  Demographics   Patient Name  Elana Cooley Date of Study          06/18/2021   MRN           [id number]         Gender                 Female   Date of Birth 1965     Room Number            [facility]   Age           50 year(s)   Height:       61 inches      Referring Physician    Ya Hopper MD   Weight:       98 pounds      Sonographer            [facility]   BSA:          1.4 m^2        Interpreting Physician Moody August MD   BMI:          18.52 kg/m^2  Procedure Type of Study   TTE procedure:ECHO NO CONTRAST WITH DOP/COLR. Study Location: Echo Lab Technical Quality: Poor visualization due to body habitus. Patient Status: STAT BP: 132/80 mmHg Indications:Chest pain. Conclusions   Summary  Structurally normal mitral valve with normal leaflet mobility. No evidence  of mitral valve stenosis or mild mitral regurgitation. Aortic valve appears to be tricuspid. Structurally normal aortic valve. No significant aortic regurgitation or stenosis is noted. Tricuspid valve is structurally normal.  No evidence of tricuspid regurgitation. Normal size left atrium. Normal left ventricular size with preserved LV function and an estimated  ejection fraction of approximately 55-60%. No evidence of left ventricular mass or thrombus noted. Normal right atrial dimension with no evidence of thrombus or mass noted. RV enlarged. Signature   ----------------------------------------------------------------  Electronically signed by Moody August MD(Interpreting  physician) on 06/18/2021 12:09 PM  ----------------------------------------------------------------   Findings   Mitral Valve  Structurally normal mitral valve with normal leaflet mobility. No evidence  of mitral valve stenosis or mild mitral regurgitation. Aortic Valve  Aortic valve appears to be tricuspid. Structurally normal aortic valve. No significant aortic regurgitation or stenosis is noted. Tricuspid Valve  Tricuspid valve is structurally normal.  No evidence of tricuspid regurgitation. Left Atrium  Normal size left atrium. Left Ventricle  Normal left ventricular size with preserved LV function and an estimated  ejection fraction of approximately 55-60%. No evidence of left ventricular mass or thrombus noted. Right Atrium  Normal right atrial dimension with no evidence of thrombus or mass noted. Right Ventricle  RV enlarged. Pericardial Effusion  No evidence of significant pericardial effusion is noted. M-Mode Measurements (cm)   LVIDd: 3.02 cm                       LVIDs: 1.93 cm  IVSd: 0.71 cm  LVPWd: 0.88 cm                       AO Root Dimension: 1.5 cm  % Ejection Fraction: 67 %            LA: 1.1 cm                                       LVOT: 1.5 cm  Doppler Measurements:   AV Peak Velocity:107 cm/s          MV Peak E-Wave: 64.5 cm/s  AV Peak Gradient: 4.58 mmHg        MV Peak A-Wave: 51.4 cm/s                                     MV E/A Ratio: 1.25 %  TR Velocity:206 cm/s               MV Peak Gradient: 1.66 mmHg  TR Gradient:16.97 mmHg             MV P1/2t: 35 msec  Estimated RAP:5 mmHg               MVA by PHT6.29 cm^2  RVSP:22 mmHg      CT ABDOMEN PELVIS WO CONTRAST Additional Contrast? None    Result Date: 5/20/2021  CT ABDOMEN PELVIS WO CONTRAST 5/20/2021 5:08 PM HISTORY: Palpable knot in upper abdomen. Abdominal distention. COMPARISON: 1/10/2021 DLP: 617 mGy cm. Automated exposure control was utilized to diminish patient radiation dose. TECHNIQUE: Noncontrast enhanced images of the abdomen and pelvis obtained without oral contrast. FINDINGS: The lung bases and base of the heart are unremarkable. LIVER: No focal liver lesion. BILIARY SYSTEM: The gallbladder is contracted.  There is mild extrahepatic biliary dilatation stable in appearance and size from the previous exam. I do not see evidence of a discrete distal ductal stone to suggest choledocholithiasis. Bettey Grad PANCREAS: The pancreatic duct is mildly prominent. This is stable from the previous examination. There is limited evaluation of the pancreatic head without intravenous or oral contrast administration. . SPLEEN: Unremarkable. KIDNEYS AND ADRENALS: Bilateral kidneys and adrenal glands are unremarkable. The ureters are decompressed and normal in appearance. RETROPERITONEUM: No mass, lymphadenopathy or hemorrhage. GI TRACT: There is moderate constipation. There is no evidence of mechanical bowel obstruction. . The appendix is visualized and unremarkable. OTHER: There is no mesenteric mass, lymphadenopathy or fluid collection. The osseous structures and soft tissues demonstrate no worrisome lesions. No free fluid is identified. A tiny fat-containing periumbilical hernia is present. PELVIS: The uterus and adnexa are unremarkable. There are phleboliths within the pelvis. . The urinary bladder is normal in appearance. 1. Moderate constipation. There is no obstruction or free air. The appendix is identified and is normal in appearance. 2. No evidence of nephrolithiasis or obstructive uropathy. 3. Mild extrahepatic biliary dilatation similar in appearance to the previous exam. The pancreatic duct is also mildly prominent. I do not see a definite discrete pancreatic head mass or evidence of choledocholithiasis but the pancreas is limited for assessment secondary to lack of IV and oral contrast administration. I would suggest that in the future if the patient is due undergo CT imaging that she obtain both IV and oral contrast secondary to her lack of intra-abdominal fat. 4. Tiny fat-containing periumbilical hernia. 5. The uterus and adnexa are unremarkable. The urinary bladder is normal in appearance. . Signed by Dr Pina Montano on 5/20/2021 7:22 PM    XR CHEST PORTABLE    Result Date: 6/18/2021  EXAMINATION:  XR CHEST PORTABLE  6/18/2021 11:01 AM HISTORY: Chest pain. COMPARISON: 5/20/2021. FINDINGS:  The lungs are expanded and clear. The heart size is normal. No acute bony abnormality is seen. Portable chest x-ray demonstrates no active disease. Signed by Dr Bill Bowden    XR CHEST PORTABLE    Result Date: 5/20/2021  EXAMINATION: Chest one view 5/20/2021 HISTORY: Increasing shortness of breath from baseline COPD symptoms. FINDINGS: Today's exam is compared to previous study dated 1/10/2021. There are emphysematous changes of the lungs with hyperinflation of the lung parenchyma. No evidence of acute consolidative pneumonia or effusion. The mediastinal contours are within normal limits. 1.. Emphysematous changes of the lungs with hyperinflation of the lung parenchyma. 2. Otherwise normal chest. Signed by Dr Kwasi Vasquez on 5/20/2021 5:23 PM    NM MYOCARDIAL SPECT REST EXERCISE OR RX    Result Date: 6/19/2021  Lexiscan Nuclear Stress Test Report Procedure date: 6/19/2021 Indications: unstable angina, Chest pain Procedure: Stress was performed with injection of 0.4 mg Lexiscan. Vital signs and EKG were monitored. Technetium-99 sestamibi was injected in divided doses, 10.85 mCi and 31.7 mCi respectively for rest and stress imaging. The patient was transported back to cardiac step down unit room 719 in stable condition. Results: Patient had symptoms of shortness of breath, heart palpitations, left arm numbness going to fingertips during infusion that resolved in recovery. Baseline EKG showed normal sinus rhythm without ST/T changes. During stress there were EKG  changes with minimal ST depression in lateral leads. Baseline and peak blood pressures were 112/54, and 126/64 respectively. Baseline and peak heart rates were 73 and  121 respectively. Myocardial perfusion images showed no ischemia. Ef 77%. Signed by Dr Tiffanie King        Assessment:  1.  Atypical chest pain  2. Smoker  3. Family history of CAD  4. Abn d dimer  5.  Normal EF  6. RV enlarged.        Recommendations:     Christina scan normal.     Fu cardiology as OP     No smoking    Moody August MD, MD 6/19/2021 9:54 AM

## 2021-06-21 LAB
EKG P AXIS: 80 DEGREES
EKG P AXIS: 84 DEGREES
EKG P-R INTERVAL: 158 MS
EKG P-R INTERVAL: 180 MS
EKG Q-T INTERVAL: 358 MS
EKG Q-T INTERVAL: 376 MS
EKG QRS DURATION: 74 MS
EKG QRS DURATION: 76 MS
EKG QTC CALCULATION (BAZETT): 389 MS
EKG QTC CALCULATION (BAZETT): 411 MS
EKG T AXIS: 62 DEGREES
EKG T AXIS: 64 DEGREES

## 2021-06-21 PROCEDURE — 93010 ELECTROCARDIOGRAM REPORT: CPT | Performed by: INTERNAL MEDICINE

## 2021-06-25 NOTE — PROGRESS NOTES
Physician Progress Note      Traci Heard  [facility] #:                  [id number]  :                       1965  ADMIT DATE:       2021 9:18 AM  DISCH DATE:        2021 12:06 PM  RESPONDING  PROVIDER #:        Sravain STREETER MD          QUERY TEXT:    Patient admitted with Chest pain. Noted documentation of Unstable Angina in   DC summary and atypical chest pain in cardiology progress note . .  If possible, please document in progress notes and discharge summary if you   are evaluating and /or treating any of the following: The medical record reflects the following:  Risk Factors: continued smoking, remote MI, CAD, GERD  Clinical Indicators: Cardiology consult: has chest wall tenderness, Lexiscan   during stress EKG changes with minimal ST depression. No ischemia   Troponin   <.01 x 3  EKG NSR  Treatment: Telemetry, Cardiology Consult, Heparin gtt, NTG gtt, home med   protonix continued  Thanks, Adry Rangel, BSN  [phone]  Options provided:  -- Unstable Angina confirmed and atypical chest pain ruled out  -- Atypical Chest pain confirmed and Unstable Angina ruled out  -- Other - I will add my own diagnosis  -- Disagree - Not applicable / Not valid  -- Disagree - Clinically unable to determine / Unknown  -- Refer to Clinical Documentation Reviewer    PROVIDER RESPONSE TEXT:    After study, Unstable Angina confirmed and Atypical chest pain ruled out.     Query created by: Gayla Mendoza on 2021 1:25 PM      Electronically signed by:  Emiliano Calderon MD 2021 8:57 AM

## 2021-06-28 DIAGNOSIS — F41.1 GENERALIZED ANXIETY DISORDER: ICD-10-CM

## 2021-06-28 DIAGNOSIS — F51.04 PSYCHOPHYSIOLOGICAL INSOMNIA: ICD-10-CM

## 2021-06-28 RX ORDER — CLONAZEPAM 1 MG/1
.5-1 TABLET ORAL DAILY PRN
Qty: 20 TABLET | Refills: 0 | Status: SHIPPED | OUTPATIENT
Start: 2021-07-01 | End: 2021-10-18 | Stop reason: SDUPTHER

## 2021-06-28 RX ORDER — ZOLPIDEM TARTRATE 10 MG/1
10 TABLET ORAL NIGHTLY
Qty: 20 TABLET | Refills: 0 | Status: SHIPPED | OUTPATIENT
Start: 2021-07-01 | End: 2021-10-18 | Stop reason: SDUPTHER

## 2021-06-28 NOTE — TELEPHONE ENCOUNTER
Pt called back again about medications needing to be refilled. Pt stated she is out of meds and needs refilled before the pharmacy closes at 5pm.

## 2021-06-28 NOTE — TELEPHONE ENCOUNTER
SPOKE WITH [facility] IN [facility], PATIENTS  LAST OFFICE VISIT AND CONFIRMATION OF SCRIPTS WAS GIVEN.

## 2021-06-28 NOTE — TELEPHONE ENCOUNTER
Caller: [facility] - [place], [place] - [address] - [phone] [facility] [phone] FX    Relationship to patient: Pharmacy    Best call back number: [phone]    Patient is needing:     Do we see her regularly, and when Ambien and Klonopin was last prescribed ? Please advise.

## 2021-06-28 NOTE — TELEPHONE ENCOUNTER
Pt called back and I told her that her scripts are available at the pharmacy on 7.1.2021.  I explained she can not  medications until 7.1.2021.  I explained that it is a temporary refill so she can find a PCP.  She stated she understood and would see her family's longtime PCP and she would be able to get right in with him.

## 2021-06-28 NOTE — TELEPHONE ENCOUNTER
We saw her regularly prior to her move and we do not plan any further refills from this office per the prescription. We will expect she get a PCP locally. You can share with them the previous dates of prescriptions for their information.

## 2021-06-28 NOTE — TELEPHONE ENCOUNTER
Caller: LawlerCarol rogers    Relationship: Self    Best call back number: [phone]    Medication needed:   Requested Prescriptions     Pending Prescriptions Disp Refills   • clonazePAM (KlonoPIN) 1 MG tablet 20 tablet 0     Sig: Take 0.5-1 tablets by mouth Daily As Needed for Anxiety for up to 30 days.   • zolpidem (AMBIEN) 10 MG tablet 14 tablet 0     Sig: Take 1 tablet by mouth Every Night.         What additional details did the patient provide when requesting the medication: PATIENT HAD TO MOVE TO [place] AND WOULD LIKE MEDS TO BE SENT IN UNTIL SHE CAN GET A PCP IN [place]      What is the patient's preferred pharmacy: [facility] [address] [facility] [address] - [phone] [facility] [phone] FX

## 2021-06-30 ENCOUNTER — TELEPHONE (OUTPATIENT)
Dept: INTERNAL MEDICINE | Facility: CLINIC | Age: 56
End: 2021-06-30

## 2021-06-30 NOTE — TELEPHONE ENCOUNTER
Patient informed to  the medication tomorrow as advised by pharmacy from previous note, per Dr. Griffin.

## 2021-06-30 NOTE — TELEPHONE ENCOUNTER
PATIENT CALLED IN REQUESTING DR CECI CLAIRE HER FILLING HER MEDICATIONS A DAY EARLY     GOOD CALL BACK   [phone]

## 2021-10-18 ENCOUNTER — OFFICE VISIT (OUTPATIENT)
Dept: INTERNAL MEDICINE | Facility: CLINIC | Age: 56
End: 2021-10-18

## 2021-10-18 VITALS
HEART RATE: 70 BPM | SYSTOLIC BLOOD PRESSURE: 106 MMHG | WEIGHT: 98 LBS | HEIGHT: 61 IN | DIASTOLIC BLOOD PRESSURE: 68 MMHG | RESPIRATION RATE: 16 BRPM | BODY MASS INDEX: 18.5 KG/M2 | TEMPERATURE: 98 F | OXYGEN SATURATION: 100 %

## 2021-10-18 DIAGNOSIS — M81.6 LOCALIZED OSTEOPOROSIS WITHOUT CURRENT PATHOLOGICAL FRACTURE: ICD-10-CM

## 2021-10-18 DIAGNOSIS — F41.1 GENERALIZED ANXIETY DISORDER: ICD-10-CM

## 2021-10-18 DIAGNOSIS — K29.30 CHRONIC SUPERFICIAL GASTRITIS WITHOUT BLEEDING: ICD-10-CM

## 2021-10-18 DIAGNOSIS — F51.04 PSYCHOPHYSIOLOGICAL INSOMNIA: ICD-10-CM

## 2021-10-18 DIAGNOSIS — Z63.4 GRIEF AT LOSS OF CHILD: Primary | ICD-10-CM

## 2021-10-18 DIAGNOSIS — F43.21 GRIEF AT LOSS OF CHILD: Primary | ICD-10-CM

## 2021-10-18 DIAGNOSIS — J41.0 SIMPLE CHRONIC BRONCHITIS (HCC): ICD-10-CM

## 2021-10-18 PROCEDURE — 99214 OFFICE O/P EST MOD 30 MIN: CPT | Performed by: NURSE PRACTITIONER

## 2021-10-18 RX ORDER — OMEPRAZOLE 20 MG/1
20 CAPSULE, DELAYED RELEASE ORAL DAILY
Qty: 90 CAPSULE | Refills: 1 | Status: SHIPPED | OUTPATIENT
Start: 2021-10-18 | End: 2022-03-04 | Stop reason: SDUPTHER

## 2021-10-18 RX ORDER — NITROGLYCERIN 0.4 MG/1
0.4 TABLET SUBLINGUAL AS NEEDED
COMMUNITY
Start: 2021-06-19

## 2021-10-18 RX ORDER — ALENDRONATE SODIUM 70 MG/1
70 TABLET ORAL WEEKLY
Qty: 12 TABLET | Refills: 2 | Status: SHIPPED | OUTPATIENT
Start: 2021-10-18 | End: 2022-01-11 | Stop reason: SDUPTHER

## 2021-10-18 RX ORDER — CLONAZEPAM 1 MG/1
.5-1 TABLET ORAL DAILY PRN
Qty: 20 TABLET | Refills: 0 | Status: SHIPPED | OUTPATIENT
Start: 2021-10-18 | End: 2021-11-08 | Stop reason: SDUPTHER

## 2021-10-18 RX ORDER — ZOLPIDEM TARTRATE 10 MG/1
10 TABLET ORAL NIGHTLY
Qty: 30 TABLET | Refills: 0 | Status: SHIPPED | OUTPATIENT
Start: 2021-10-18 | End: 2021-11-15 | Stop reason: SDUPTHER

## 2021-10-18 RX ORDER — ALBUTEROL SULFATE 90 UG/1
2 AEROSOL, METERED RESPIRATORY (INHALATION) EVERY 4 HOURS PRN
Qty: 18 G | Refills: 3 | Status: SHIPPED | OUTPATIENT
Start: 2021-10-18 | End: 2022-01-07 | Stop reason: SDUPTHER

## 2021-10-18 RX ORDER — BUPRENORPHINE AND NALOXONE 8; 2 MG/1; MG/1
FILM, SOLUBLE BUCCAL; SUBLINGUAL
COMMUNITY
Start: 2021-10-15

## 2021-10-18 SDOH — SOCIAL STABILITY - SOCIAL INSECURITY: DISSAPEARANCE AND DEATH OF FAMILY MEMBER: Z63.4

## 2021-10-18 NOTE — PROGRESS NOTES
Chief Complaint   Patient presents with   • Med Refill     Pt requests refills of Klonopin and Ambien       History:  Carol Lawler is a 56 y.o. female who presents today for follow-up for evaluation of the above:    HPI     Patient presents today for f/u insomnia and anxiety.  She reports she has moved back from [place] and is now caring for her granddaughter after the death of her daughter.   She has re-established care with [facility] for her suboxone.   She is currently out of her ambien and clonazepam.  She is using clonazepam on a PRN basis for grief and anxiety.      Declines flu shot today            ROS:  Review of Systems   Constitutional: Negative for fatigue and unexpected weight change.   HENT: Negative.    Eyes: Negative.    Respiratory: Negative.    Cardiovascular: Negative.    Gastrointestinal: Negative for abdominal pain, constipation and diarrhea.   Endocrine: Negative.    Genitourinary: Negative for difficulty urinating, dyspareunia, genital sores, menstrual problem, pelvic pain, vaginal bleeding, vaginal discharge and vaginal pain.   Musculoskeletal: Negative.    Skin: Negative.    Neurological: Negative.    Psychiatric/Behavioral: Positive for sleep disturbance. The patient is nervous/anxious.        Ms. Lawler  reports that she has been smoking cigarettes. She has a 45.00 pack-year smoking history. She has never used smokeless tobacco. She reports that she does not drink alcohol and does not use drugs.      Current Outpatient Medications:   •  albuterol sulfate  (90 Base) MCG/ACT inhaler, Inhale 2 puffs Every 4 (Four) Hours As Needed for Wheezing., Disp: 18 g, Rfl: 3  •  alendronate (FOSAMAX) 70 MG tablet, Take 1 tablet by mouth 1 (One) Time Per Week., Disp: 12 tablet, Rfl: 2  •  nitroglycerin (NITROSTAT) 0.4 MG SL tablet, Take 0.4 mg by mouth As Needed., Disp: , Rfl:   •  omeprazole (PrilOSEC) 20 MG capsule, Take 1 capsule by mouth Daily., Disp: 90 capsule, Rfl: 1  •  zolpidem  "(AMBIEN) 10 MG tablet, Take 1 tablet by mouth Every Night., Disp: 30 tablet, Rfl: 0  •  buprenorphine-naloxone (SUBOXONE) 8-2 MG film film, DISSOLVE 2 FILMS UNDER THE TONGUE ONCE DAILY, Disp: , Rfl:   •  clonazePAM (KlonoPIN) 1 MG tablet, Take 0.5-1 tablets by mouth Daily As Needed for Anxiety for up to 30 days., Disp: 20 tablet, Rfl: 0      OBJECTIVE:  /68 (BP Location: Left arm, Patient Position: Sitting, Cuff Size: Adult)   Pulse 70   Temp 98 °F (36.7 °C) (Temporal)   Resp 16   Ht 154.9 cm (61\")   Wt 44.5 kg (98 lb)   SpO2 100%   BMI 18.52 kg/m²    Physical Exam  Vitals reviewed.   Constitutional:       Appearance: She is well-developed.   HENT:      Head: Normocephalic and atraumatic.   Eyes:      Pupils: Pupils are equal, round, and reactive to light.   Cardiovascular:      Rate and Rhythm: Normal rate and regular rhythm.      Heart sounds: Normal heart sounds.   Pulmonary:      Effort: Pulmonary effort is normal.      Breath sounds: Normal breath sounds.   Abdominal:      General: Bowel sounds are normal.      Palpations: Abdomen is soft.   Musculoskeletal:         General: Normal range of motion.      Cervical back: Normal range of motion and neck supple.   Skin:     General: Skin is warm and dry.   Neurological:      Mental Status: She is alert and oriented to person, place, and time.         Assessment/Plan    Diagnoses and all orders for this visit:    1. Grief at loss of child (Primary)  -     Ambulatory Referral to Psychiatry  Discussed with patient need to establish care with behavioral health for counseling though her grief is appropriate and she feels she is working through this.     2. Simple chronic bronchitis (HCC)  -     albuterol sulfate  (90 Base) MCG/ACT inhaler; Inhale 2 puffs Every 4 (Four) Hours As Needed for Wheezing.  Dispense: 18 g; Refill: 3    3. Localized osteoporosis without current pathological fracture  -     alendronate (FOSAMAX) 70 MG tablet; Take 1 tablet by " mouth 1 (One) Time Per Week.  Dispense: 12 tablet; Refill: 2    4. Chronic superficial gastritis without bleeding  -     omeprazole (PrilOSEC) 20 MG capsule; Take 1 capsule by mouth Daily.  Dispense: 90 capsule; Refill: 1    5. Generalized anxiety disorder  -     Ambulatory Referral to Psychiatry  -     clonazePAM (KlonoPIN) 1 MG tablet; Take 0.5-1 tablets by mouth Daily As Needed for Anxiety for up to 30 days.  Dispense: 20 tablet; Refill: 0    6. Psychophysiological insomnia  -     zolpidem (AMBIEN) 10 MG tablet; Take 1 tablet by mouth Every Night.  Dispense: 30 tablet; Refill: 0  -     clonazePAM (KlonoPIN) 1 MG tablet; Take 0.5-1 tablets by mouth Daily As Needed for Anxiety for up to 30 days.  Dispense: 20 tablet; Refill: 0  PDMP is reviewed and VALERIO Pierre MA called her pharmacy in [place] to confirm fills.       Patient declines lung cancer screening  Declines flu vaccine.        An After Visit Summary was printed and given to the patient at discharge.  Return in about 3 months (around 1/18/2022). Sooner if problems arise.          Keli Renee APRN. 10/18/2021   Electronically Signed

## 2021-10-21 ENCOUNTER — OFFICE VISIT (OUTPATIENT)
Dept: INTERNAL MEDICINE | Facility: CLINIC | Age: 56
End: 2021-10-21

## 2021-10-21 VITALS
SYSTOLIC BLOOD PRESSURE: 108 MMHG | WEIGHT: 93 LBS | BODY MASS INDEX: 17.56 KG/M2 | TEMPERATURE: 97.5 F | OXYGEN SATURATION: 94 % | HEIGHT: 61 IN | HEART RATE: 104 BPM | DIASTOLIC BLOOD PRESSURE: 74 MMHG

## 2021-10-21 DIAGNOSIS — R39.9 UTI SYMPTOMS: Primary | ICD-10-CM

## 2021-10-21 DIAGNOSIS — F43.21 SITUATIONAL DEPRESSION: ICD-10-CM

## 2021-10-21 DIAGNOSIS — F41.1 GENERALIZED ANXIETY DISORDER: ICD-10-CM

## 2021-10-21 DIAGNOSIS — F43.21 GRIEF: ICD-10-CM

## 2021-10-21 DIAGNOSIS — F51.04 PSYCHOPHYSIOLOGICAL INSOMNIA: ICD-10-CM

## 2021-10-21 DIAGNOSIS — N30.90 CYSTITIS: ICD-10-CM

## 2021-10-21 LAB
BILIRUB BLD-MCNC: NEGATIVE MG/DL
CLARITY, POC: CLEAR
COLOR UR: YELLOW
GLUCOSE UR STRIP-MCNC: NEGATIVE MG/DL
KETONES UR QL: NEGATIVE
LEUKOCYTE EST, POC: ABNORMAL
NITRITE UR-MCNC: NEGATIVE MG/ML
PH UR: 6 [PH] (ref 5–8)
PROT UR STRIP-MCNC: ABNORMAL MG/DL
RBC # UR STRIP: NEGATIVE /UL
SP GR UR: 1.02 (ref 1–1.03)
UROBILINOGEN UR QL: NORMAL

## 2021-10-21 PROCEDURE — 99214 OFFICE O/P EST MOD 30 MIN: CPT | Performed by: NURSE PRACTITIONER

## 2021-10-21 RX ORDER — NITROFURANTOIN 25; 75 MG/1; MG/1
100 CAPSULE ORAL 2 TIMES DAILY
Qty: 14 CAPSULE | Refills: 0 | Status: SHIPPED | OUTPATIENT
Start: 2021-10-21 | End: 2022-01-11

## 2021-10-21 NOTE — PROGRESS NOTES
Subjective   Carol Lawler is a 56 y.o. female.   Chief Complaint   Patient presents with   • Establish Care   • Insomnia     [facility] for Suboxone.    • Urinary Tract Infection     x2 weeks pressure,frequency,burning       Ms. Lawler is a pleasant 56-year-old female who presents to the office with acute complaints of urinary tract infection, insomnia and anxiety, and to establish care.  While discussing her UTI symptoms patient reports that she would prefer to stay with Dr. Lambert was having issues getting into their office sooner than 2 weeks and had significant urinary tract infection symptoms that her father had recommended her come and see me today since he is my patient.  Patient's UTI symptoms started on the 18th and she does have a history of polynephritis and reports history of sepsis requiring extensive hospital stay.  She reports frequency and burning worsening daily.  No improvement with hydration or over-the-counter supplements.  She reports mild abdominal pain but this is chronic and unsure if it is acutely worse.  She denies mid back pain and has a negative CVA bilaterally.    Patient is dealing with significant grief after losing her daughter in a traumatic way.  She had previously seen Dr. Bowen in the past and he is just now from Dr. Lambert's office has sent a referral however they have not called to set up an appointment yet patient is grieving significantly.  We did discuss possibly setting her up with a counseling session prior to getting her established with Dr. Bowen.  Patient is agreeable and will be given a list of counselors within the region.  We will continue to proceed with Dr. Bowen referral.  Patient was confused if Dr. Lambert's office was going to continue to fill her Ambien and Klonopin related to her acute distress, insomnia, and general anxiety disorder.  We did check with the pharmacy today and confirmed that her prescriptions have been signed and sent.  Patient reports that  "sleep has been difficult and is getting on average 4 hours of sleep with frequent wakening.  She reports consistent nightmares visualizing her daughters traumatic incident that led to her death.  She is working with a grief group and reports \"it is helping me somewhat \".  She does report psychosocial port from her other family members especially from her father and her [employer] family.  She denies suicidal thoughts or planning.       The following portions of the patient's history were reviewed and updated as appropriate: allergies, current medications, past family history, past medical history, past social history, past surgical history and problem list.    Review of Systems   Constitutional: Positive for appetite change. Negative for activity change, fatigue, fever, unexpected weight gain and unexpected weight loss.        This is been an issue related to her grief   HENT: Negative for swollen glands, trouble swallowing and voice change.    Eyes: Negative for blurred vision and visual disturbance.   Respiratory: Negative for cough and shortness of breath.    Cardiovascular: Negative for chest pain, palpitations and leg swelling.   Gastrointestinal: Negative for abdominal pain, constipation, diarrhea, nausea, vomiting and indigestion.   Endocrine: Negative for cold intolerance, heat intolerance, polydipsia and polyphagia.   Genitourinary: Positive for dysuria, frequency, hematuria and urgency.   Musculoskeletal: Negative for arthralgias, back pain, joint swelling and neck pain.   Skin: Negative for color change, rash and skin lesions.   Neurological: Negative for dizziness, weakness, headache, memory problem and confusion.   Hematological: Does not bruise/bleed easily.   Psychiatric/Behavioral: Negative for agitation, hallucinations and suicidal ideas. The patient is not nervous/anxious.        Objective   Past Medical History:   Diagnosis Date   • Anxiety    • COPD (chronic obstructive pulmonary disease) (HCC)    • " Insomnia    • Osteoporosis       Past Surgical History:   Procedure Laterality Date   • TONSILLECTOMY     • TUBAL ABDOMINAL LIGATION          Current Outpatient Medications:   •  albuterol sulfate  (90 Base) MCG/ACT inhaler, Inhale 2 puffs Every 4 (Four) Hours As Needed for Wheezing., Disp: 18 g, Rfl: 3  •  alendronate (FOSAMAX) 70 MG tablet, Take 1 tablet by mouth 1 (One) Time Per Week., Disp: 12 tablet, Rfl: 2  •  buprenorphine-naloxone (SUBOXONE) 8-2 MG film film, DISSOLVE 2 FILMS UNDER THE TONGUE ONCE DAILY, Disp: , Rfl:   •  clonazePAM (KlonoPIN) 1 MG tablet, Take 0.5-1 tablets by mouth Daily As Needed for Anxiety for up to 30 days., Disp: 20 tablet, Rfl: 0  •  nitroglycerin (NITROSTAT) 0.4 MG SL tablet, Take 0.4 mg by mouth As Needed., Disp: , Rfl:   •  omeprazole (PrilOSEC) 20 MG capsule, Take 1 capsule by mouth Daily., Disp: 90 capsule, Rfl: 1  •  zolpidem (AMBIEN) 10 MG tablet, Take 1 tablet by mouth Every Night., Disp: 30 tablet, Rfl: 0  •  nitrofurantoin, macrocrystal-monohydrate, (Macrobid) 100 MG capsule, Take 1 capsule by mouth 2 (Two) Times a Day., Disp: 14 capsule, Rfl: 0      Vitals:    10/21/21 1102   BP: 108/74   Pulse: 104   Temp: 97.5 °F (36.4 °C)   SpO2: 94%         10/21/21  1102   Weight: 42.2 kg (93 lb)       Body mass index is 17.57 kg/m².    Physical Exam  Vitals and nursing note reviewed.   Constitutional:       Appearance: Normal appearance. She is well-developed, well-groomed and underweight.   HENT:      Head: Normocephalic and atraumatic.      Right Ear: Hearing and external ear normal.      Left Ear: Hearing and external ear normal.      Nose: Nose normal.      Mouth/Throat:      Lips: Pink.      Mouth: Mucous membranes are moist.   Eyes:      General: Lids are normal. Lids are everted, no foreign bodies appreciated. Vision grossly intact.      Conjunctiva/sclera: Conjunctivae normal.      Pupils: Pupils are equal, round, and reactive to light.   Neck:      Thyroid: No  thyromegaly.   Cardiovascular:      Rate and Rhythm: Regular rhythm. Tachycardia present. Occasional extrasystoles are present.     Pulses: Normal pulses.      Heart sounds: Normal heart sounds. No murmur heard.      Pulmonary:      Effort: Pulmonary effort is normal.      Breath sounds: Normal breath sounds.   Abdominal:      General: Abdomen is flat. Bowel sounds are normal.      Palpations: Abdomen is soft.      Tenderness: There is abdominal tenderness in the suprapubic area. There is no right CVA tenderness or left CVA tenderness.      Hernia: No hernia is present.   Genitourinary:     Comments: Deferred exam does report  burning irritation within perineum  Musculoskeletal:      Cervical back: Normal range of motion and neck supple.      Right lower leg: No edema.      Left lower leg: No edema.   Lymphadenopathy:      Head:      Right side of head: No submental, submandibular, tonsillar, preauricular, posterior auricular or occipital adenopathy.      Left side of head: No submental, submandibular, tonsillar, preauricular, posterior auricular or occipital adenopathy.      Cervical: No cervical adenopathy.   Skin:     General: Skin is warm and dry.      Capillary Refill: Capillary refill takes less than 2 seconds.   Neurological:      General: No focal deficit present.      Mental Status: She is alert and oriented to person, place, and time.      Deep Tendon Reflexes: Reflexes are normal and symmetric.   Psychiatric:         Attention and Perception: Attention normal.         Mood and Affect: Mood is depressed. Affect is tearful.         Speech: Speech normal.         Behavior: Behavior normal. Behavior is cooperative.         Thought Content: Thought content normal.         Cognition and Memory: Cognition and memory normal.         Judgment: Judgment normal.               Assessment/Plan   Diagnoses and all orders for this visit:    1. UTI symptoms (Primary)  -     POC Urinalysis Dipstick, Multipro  -     Urine  Culture - Urine, Urine, Clean Catch  -     nitrofurantoin, macrocrystal-monohydrate, (Macrobid) 100 MG capsule; Take 1 capsule by mouth 2 (Two) Times a Day.  Dispense: 14 capsule; Refill: 0    2. Cystitis  -     nitrofurantoin, macrocrystal-monohydrate, (Macrobid) 100 MG capsule; Take 1 capsule by mouth 2 (Two) Times a Day.  Dispense: 14 capsule; Refill: 0    3. Grief    4. Psychophysiological insomnia    5. Generalized anxiety disorder    6. Situational depression      Patient would like to remain established with Dr. Lambert's office.  We will contact their office today to confirm that patient has not been dismissed for any reason.  Would be glad to keep her in my office if this is the case.  Patient will continue with the referral to Dr. oBwen's office, however we will give her a list of providers and did discuss [facility] therapy with their  on staff in close proximity to Dr. Bowen's office as well if she would like to proceed with this while waiting on her appoint with Dr. Bowen.  There has been some considerable wait times to get in with Dr. Bowen's office.    Patient has symptoms of urinary tract infection no obvious signs of infection but with her history would go ahead and treat her with Macrobid.  Will call her with culture and change antibiotic as necessary.  We will have her continue to increase her hydration and can go ahead and take cranberry supplementation daily to help with preventing UTIs in the future.  She does report frequent urinary tract infections.  Advised her discussed with Dr. Lambert if she would be a candidate for Premarin cream related to the burning sensation that occurs in between UTI infections.    Patient will go to get her Klonopin and Ambien to take as needed for her acute grief and improve her sleep.  Patient verbalizes understanding that is the medication that she should not continue prolonged periods and has been compliant with the urine drug screen and Benja has been  appropriate.    Would prefer patient to see Dr. Lambert within the next month just for follow-up on UTI symptoms and to reevaluate her insomnia and moderate depression anxiety.

## 2021-10-23 LAB
BACTERIA UR CULT: NORMAL
BACTERIA UR CULT: NORMAL

## 2021-10-25 ENCOUNTER — TELEPHONE (OUTPATIENT)
Dept: INTERNAL MEDICINE | Facility: CLINIC | Age: 56
End: 2021-10-25

## 2021-10-25 NOTE — TELEPHONE ENCOUNTER
----- Message from LEIGH Cabral sent at 10/25/2021  5:56 AM CDT -----  No bacteria cultured; can complete antibiotic. Need to discuss with Dr jones symptoms possibly hormonal.

## 2021-11-04 DIAGNOSIS — F41.1 GENERALIZED ANXIETY DISORDER: ICD-10-CM

## 2021-11-04 DIAGNOSIS — F51.04 PSYCHOPHYSIOLOGICAL INSOMNIA: ICD-10-CM

## 2021-11-04 RX ORDER — ZOLPIDEM TARTRATE 10 MG/1
10 TABLET ORAL EVERY EVENING
Qty: 30 TABLET | Refills: 1 | OUTPATIENT
Start: 2021-11-04

## 2021-11-04 RX ORDER — CLONAZEPAM 1 MG/1
1 TABLET ORAL NIGHTLY PRN
Qty: 20 TABLET | Refills: 1 | OUTPATIENT
Start: 2021-11-04

## 2021-11-08 DIAGNOSIS — F51.04 PSYCHOPHYSIOLOGICAL INSOMNIA: ICD-10-CM

## 2021-11-08 DIAGNOSIS — F41.1 GENERALIZED ANXIETY DISORDER: ICD-10-CM

## 2021-11-08 DIAGNOSIS — J41.0 SIMPLE CHRONIC BRONCHITIS (HCC): ICD-10-CM

## 2021-11-08 RX ORDER — CLONAZEPAM 1 MG/1
.5-1 TABLET ORAL DAILY PRN
Qty: 20 TABLET | Refills: 0 | OUTPATIENT
Start: 2021-11-08 | End: 2021-12-08

## 2021-11-08 RX ORDER — CLONAZEPAM 1 MG/1
.5-1 TABLET ORAL DAILY PRN
Qty: 20 TABLET | Refills: 0 | Status: SHIPPED | OUTPATIENT
Start: 2021-11-08 | End: 2021-11-29 | Stop reason: SDUPTHER

## 2021-11-08 RX ORDER — ALBUTEROL SULFATE 90 UG/1
2 AEROSOL, METERED RESPIRATORY (INHALATION) EVERY 4 HOURS PRN
Qty: 18 G | Refills: 3 | OUTPATIENT
Start: 2021-11-08

## 2021-11-08 RX ORDER — ZOLPIDEM TARTRATE 10 MG/1
10 TABLET ORAL NIGHTLY
Qty: 30 TABLET | Refills: 0 | OUTPATIENT
Start: 2021-11-08

## 2021-11-08 NOTE — TELEPHONE ENCOUNTER
Caller: Carol Lawler    Relationship: Self    Requested Prescriptions:   Requested Prescriptions     Pending Prescriptions Disp Refills   • zolpidem (AMBIEN) 10 MG tablet 30 tablet 0     Sig: Take 1 tablet by mouth Every Night.   • albuterol sulfate  (90 Base) MCG/ACT inhaler 18 g 3     Sig: Inhale 2 puffs Every 4 (Four) Hours As Needed for Wheezing.   • clonazePAM (KlonoPIN) 1 MG tablet 20 tablet 0     Sig: Take 0.5-1 tablets by mouth Daily As Needed for Anxiety for up to 30 days.        Pharmacy where request should be sent: [facility]   11/08/21 08:59 CST

## 2021-11-12 DIAGNOSIS — F51.04 PSYCHOPHYSIOLOGICAL INSOMNIA: ICD-10-CM

## 2021-11-12 RX ORDER — ZOLPIDEM TARTRATE 10 MG/1
10 TABLET ORAL NIGHTLY
Qty: 30 TABLET | Refills: 0 | OUTPATIENT
Start: 2021-11-12

## 2021-11-12 NOTE — TELEPHONE ENCOUNTER
Caller: LawlerCarol    Relationship: Self    Best call back number: [phone]      Requested Prescriptions     Pending Prescriptions Disp Refills   • zolpidem (AMBIEN) 10 MG tablet 30 tablet 0     Sig: Take 1 tablet by mouth Every Night.        Pharmacy where request should be sent:    [place] [address]  [address] - [phone] PH - [phone] FX     Does the patient have less than a 3 day supply:  [x] Yes  [] No    Ervin Gamboa Rep   11/12/21 11:07 CST

## 2021-11-15 DIAGNOSIS — F51.04 PSYCHOPHYSIOLOGICAL INSOMNIA: ICD-10-CM

## 2021-11-15 RX ORDER — ZOLPIDEM TARTRATE 10 MG/1
10 TABLET ORAL NIGHTLY
Qty: 30 TABLET | Refills: 0 | Status: SHIPPED | OUTPATIENT
Start: 2021-11-15 | End: 2021-12-13

## 2021-11-29 DIAGNOSIS — F41.1 GENERALIZED ANXIETY DISORDER: ICD-10-CM

## 2021-11-29 DIAGNOSIS — F51.04 PSYCHOPHYSIOLOGICAL INSOMNIA: ICD-10-CM

## 2021-11-29 RX ORDER — CLONAZEPAM 1 MG/1
.5-1 TABLET ORAL DAILY PRN
Qty: 20 TABLET | Refills: 0 | OUTPATIENT
Start: 2021-11-29

## 2021-11-29 RX ORDER — CLONAZEPAM 1 MG/1
.5-1 TABLET ORAL DAILY PRN
Qty: 20 TABLET | Refills: 0 | Status: SHIPPED | OUTPATIENT
Start: 2021-11-29 | End: 2021-12-22 | Stop reason: SDUPTHER

## 2021-11-29 NOTE — TELEPHONE ENCOUNTER
Caller: Carol Lawler    Relationship: Self    Best call back number: [phone]    Requested Prescriptions:   Requested Prescriptions     Pending Prescriptions Disp Refills   • clonazePAM (KlonoPIN) 1 MG tablet 20 tablet 0     Sig: Take 0.5-1 tablets by mouth Daily As Needed for Anxiety. #20 should last 30 days.        Pharmacy where request should be sent: [address] - [phone] [facility] [phone] FX     Additional details provided by patient:     Patient only has a 1 day supply left.     Does the patient have less than a 3 day supply:  [x] Yes  [] No    Adwoa Rutledge MA   11/29/21 09:02 CST

## 2021-12-01 DIAGNOSIS — K29.30 CHRONIC SUPERFICIAL GASTRITIS WITHOUT BLEEDING: ICD-10-CM

## 2021-12-01 DIAGNOSIS — M81.6 LOCALIZED OSTEOPOROSIS WITHOUT CURRENT PATHOLOGICAL FRACTURE: ICD-10-CM

## 2021-12-01 DIAGNOSIS — J41.0 SIMPLE CHRONIC BRONCHITIS (HCC): ICD-10-CM

## 2021-12-01 DIAGNOSIS — F51.04 PSYCHOPHYSIOLOGICAL INSOMNIA: ICD-10-CM

## 2021-12-01 RX ORDER — ZOLPIDEM TARTRATE 10 MG/1
10 TABLET ORAL NIGHTLY
Qty: 30 TABLET | Refills: 0 | OUTPATIENT
Start: 2021-12-01

## 2021-12-01 RX ORDER — ALENDRONATE SODIUM 70 MG/1
70 TABLET ORAL WEEKLY
Qty: 12 TABLET | Refills: 2 | OUTPATIENT
Start: 2021-12-01

## 2021-12-01 RX ORDER — ALBUTEROL SULFATE 90 UG/1
2 AEROSOL, METERED RESPIRATORY (INHALATION) EVERY 4 HOURS PRN
Qty: 18 G | Refills: 3 | OUTPATIENT
Start: 2021-12-01

## 2021-12-01 RX ORDER — OMEPRAZOLE 20 MG/1
20 CAPSULE, DELAYED RELEASE ORAL DAILY
Qty: 90 CAPSULE | Refills: 1 | OUTPATIENT
Start: 2021-12-01

## 2021-12-01 NOTE — TELEPHONE ENCOUNTER
Caller: Carol Lawler    Relationship: Self    Best call back number: [phone]    Requested Prescriptions:   Requested Prescriptions     Pending Prescriptions Disp Refills   • zolpidem (AMBIEN) 10 MG tablet 30 tablet 0     Sig: Take 1 tablet by mouth Every Night.   • omeprazole (PrilOSEC) 20 MG capsule 90 capsule 1     Sig: Take 1 capsule by mouth Daily.   • albuterol sulfate  (90 Base) MCG/ACT inhaler 18 g 3     Sig: Inhale 2 puffs Every 4 (Four) Hours As Needed for Wheezing.   • alendronate (FOSAMAX) 70 MG tablet 12 tablet 2     Sig: Take 1 tablet by mouth 1 (One) Time Per Week.        [address] [phone]  - [phone] FX     Additional details provided by patient: 2 DAY SUPPLY.    Does the patient have less than a 3 day supply:  [x] Yes  [] No    Ervin Lyle Rep   12/01/21 13:15 CST

## 2021-12-11 DIAGNOSIS — F51.04 PSYCHOPHYSIOLOGICAL INSOMNIA: ICD-10-CM

## 2021-12-13 DIAGNOSIS — F51.04 PSYCHOPHYSIOLOGICAL INSOMNIA: ICD-10-CM

## 2021-12-13 RX ORDER — ZOLPIDEM TARTRATE 10 MG/1
10 TABLET ORAL EVERY EVENING
Qty: 30 TABLET | Refills: 0 | Status: SHIPPED | OUTPATIENT
Start: 2021-12-15 | End: 2022-01-11 | Stop reason: SDUPTHER

## 2021-12-13 RX ORDER — ZOLPIDEM TARTRATE 10 MG/1
TABLET ORAL
Qty: 30 TABLET | Refills: 5 | OUTPATIENT
Start: 2021-12-13

## 2021-12-15 NOTE — TELEPHONE ENCOUNTER
Her prior request on 12/13/2021 was signed but cannot be filled until 12/15/2021. She may call her pharmacy.

## 2021-12-22 DIAGNOSIS — F41.1 GENERALIZED ANXIETY DISORDER: ICD-10-CM

## 2021-12-22 DIAGNOSIS — F51.04 PSYCHOPHYSIOLOGICAL INSOMNIA: ICD-10-CM

## 2021-12-22 RX ORDER — CLONAZEPAM 1 MG/1
.5-1 TABLET ORAL DAILY PRN
Qty: 20 TABLET | Refills: 0 | Status: SHIPPED | OUTPATIENT
Start: 2021-12-29 | End: 2022-01-11 | Stop reason: SDUPTHER

## 2021-12-22 NOTE — TELEPHONE ENCOUNTER
Caller: Carol Lawler    Relationship: Self    Requested Prescriptions:   Requested Prescriptions     Pending Prescriptions Disp Refills   • clonazePAM (KlonoPIN) 1 MG tablet 20 tablet 0     Sig: Take 0.5-1 tablets by mouth Daily As Needed for Anxiety. #20 should last 30 days.        Pharmacy where request should be sent: [place] [address] - [phone] PH - [phone] FX       KIRSTEN Liang   12/22/21 09:37 CST

## 2022-01-06 ENCOUNTER — NURSE TRIAGE (OUTPATIENT)
Dept: CALL CENTER | Facility: HOSPITAL | Age: 57
End: 2022-01-06
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
## 2022-01-07 DIAGNOSIS — J41.0 SIMPLE CHRONIC BRONCHITIS: ICD-10-CM

## 2022-01-07 DIAGNOSIS — F51.04 PSYCHOPHYSIOLOGICAL INSOMNIA: ICD-10-CM

## 2022-01-07 DIAGNOSIS — F41.1 GENERALIZED ANXIETY DISORDER: ICD-10-CM

## 2022-01-07 PROBLEM — Z91.199 PERSONAL HISTORY OF NONCOMPLIANCE WITH MEDICAL TREATMENT AND REGIMEN: Status: ACTIVE | Noted: 2021-06-18

## 2022-01-07 PROBLEM — I25.10 CAD (CORONARY ARTERY DISEASE): Status: ACTIVE | Noted: 2021-06-18

## 2022-01-07 PROBLEM — F17.219 CIGARETTE NICOTINE DEPENDENCE WITH NICOTINE-INDUCED DISORDER: Status: ACTIVE | Noted: 2021-06-18

## 2022-01-07 PROBLEM — I25.2 OLD MI (MYOCARDIAL INFARCTION): Status: ACTIVE | Noted: 2021-06-18

## 2022-01-07 RX ORDER — ALBUTEROL SULFATE 90 UG/1
2 AEROSOL, METERED RESPIRATORY (INHALATION) EVERY 4 HOURS PRN
Qty: 18 G | Refills: 3 | Status: SHIPPED | OUTPATIENT
Start: 2022-01-07 | End: 2022-01-11 | Stop reason: SDUPTHER

## 2022-01-07 RX ORDER — CLONAZEPAM 1 MG/1
.5-1 TABLET ORAL DAILY PRN
Qty: 20 TABLET | Refills: 0 | OUTPATIENT
Start: 2022-01-07

## 2022-01-07 NOTE — TELEPHONE ENCOUNTER
Cannot fill until 1/22; need to confirm with Dr. Bowen's office that they recommend staying on this therapy while on ambien and minipress.

## 2022-01-07 NOTE — TELEPHONE ENCOUNTER
Caller: LawlerCarol    Relationship: Self    Best call back number: [phone]    Requested Prescriptions:   Requested Prescriptions     Pending Prescriptions Disp Refills   • clonazePAM (KlonoPIN) 1 MG tablet 20 tablet 0     Sig: Take 0.5-1 tablets by mouth Daily As Needed for Anxiety. #20 should last 30 days.        Pharmacy where request should be sent: [address] - [phone]  - [phone] FX     Does the patient have less than a 3 day supply:  [x] Yes  [] No    Ervin Crabtree Rep   01/07/22 12:07 CST

## 2022-01-11 ENCOUNTER — OFFICE VISIT (OUTPATIENT)
Dept: INTERNAL MEDICINE | Facility: CLINIC | Age: 57
End: 2022-01-11

## 2022-01-11 VITALS
OXYGEN SATURATION: 98 % | HEART RATE: 88 BPM | BODY MASS INDEX: 17.67 KG/M2 | WEIGHT: 93.6 LBS | TEMPERATURE: 97.1 F | DIASTOLIC BLOOD PRESSURE: 70 MMHG | HEIGHT: 61 IN | SYSTOLIC BLOOD PRESSURE: 104 MMHG

## 2022-01-11 DIAGNOSIS — F43.0 PANIC ATTACK DUE TO EXCEPTIONAL STRESS: ICD-10-CM

## 2022-01-11 DIAGNOSIS — F51.04 PSYCHOPHYSIOLOGICAL INSOMNIA: ICD-10-CM

## 2022-01-11 DIAGNOSIS — F17.200 TOBACCO DEPENDENCE: Primary | ICD-10-CM

## 2022-01-11 DIAGNOSIS — J41.0 SIMPLE CHRONIC BRONCHITIS: ICD-10-CM

## 2022-01-11 DIAGNOSIS — F41.0 PANIC ATTACK DUE TO EXCEPTIONAL STRESS: ICD-10-CM

## 2022-01-11 DIAGNOSIS — F41.1 GENERALIZED ANXIETY DISORDER: ICD-10-CM

## 2022-01-11 DIAGNOSIS — M81.6 LOCALIZED OSTEOPOROSIS WITHOUT CURRENT PATHOLOGICAL FRACTURE: ICD-10-CM

## 2022-01-11 PROCEDURE — 99214 OFFICE O/P EST MOD 30 MIN: CPT | Performed by: NURSE PRACTITIONER

## 2022-01-11 RX ORDER — ZOLPIDEM TARTRATE 5 MG/1
5 TABLET ORAL EVERY EVENING
Qty: 30 TABLET | Refills: 0 | Status: SHIPPED | OUTPATIENT
Start: 2022-01-11 | End: 2022-02-09 | Stop reason: SDUPTHER

## 2022-01-11 RX ORDER — CLONAZEPAM 0.5 MG/1
0.5 TABLET ORAL 2 TIMES DAILY PRN
Qty: 60 TABLET | Refills: 0 | Status: SHIPPED | OUTPATIENT
Start: 2022-01-11 | End: 2022-02-09 | Stop reason: SDUPTHER

## 2022-01-11 RX ORDER — ALBUTEROL SULFATE 90 UG/1
2 AEROSOL, METERED RESPIRATORY (INHALATION) EVERY 4 HOURS PRN
Qty: 18 G | Refills: 3 | Status: SHIPPED | OUTPATIENT
Start: 2022-01-11

## 2022-01-11 RX ORDER — TIOTROPIUM BROMIDE INHALATION SPRAY 1.56 UG/1
2 SPRAY, METERED RESPIRATORY (INHALATION)
Qty: 4 G | Refills: 5 | Status: SHIPPED | OUTPATIENT
Start: 2022-01-11 | End: 2022-02-08 | Stop reason: SDUPTHER

## 2022-01-11 RX ORDER — ALENDRONATE SODIUM 70 MG/1
70 TABLET ORAL WEEKLY
Qty: 12 TABLET | Refills: 2 | Status: SHIPPED | OUTPATIENT
Start: 2022-01-11
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
## 2022-01-11 NOTE — PATIENT INSTRUCTIONS
Clonazepam tablets  What is this medicine?  CLONAZEPAM (kloe NA ze alonzo) is a benzodiazepine. It is used to treat certain types of seizures. It is also used to treat panic disorder.  This medicine may be used for other purposes; ask your health care provider or pharmacist if you have questions.  COMMON BRAND NAME(S): Arjun Brand  What should I tell my health care provider before I take this medicine?  They need to know if you have any of these conditions:  · an alcohol or drug abuse problem  · bipolar disorder, depression, psychosis or other mental health condition  · glaucoma  · kidney or liver disease  · lung or breathing disease  · myasthenia gravis  · Parkinson's disease  · porphyria  · seizures or a history of seizures  · suicidal thoughts  · an unusual or allergic reaction to clonazepam, other benzodiazepines, foods, dyes, or preservatives  · pregnant or trying to get pregnant  · breast-feeding  How should I use this medicine?  Take this medicine by mouth with a glass of water. Follow the directions on the prescription label. If it upsets your stomach, take it with food or milk. Take your medicine at regular intervals. Do not take it more often than directed. Do not stop taking or change the dose except on the advice of your doctor or health care professional.  A special MedGuide will be given to you by the pharmacist with each prescription and refill. Be sure to read this information carefully each time.  Talk to your pediatrician regarding the use of this medicine in children. Special care may be needed.  Overdosage: If you think you have taken too much of this medicine contact a poison control center or emergency room at once.  NOTE: This medicine is only for you. Do not share this medicine with others.  What if I miss a dose?  If you miss a dose, take it as soon as you can. If it is almost time for your next dose, take only that dose. Do not take double or extra doses.  What may interact with this  medicine?  Do not take this medication with any of the following medicines:  · narcotic medicines for cough  · sodium oxybate  This medicine may also interact with the following medications:  · alcohol  · antihistamines for allergy, cough and cold  · antiviral medicines for HIV or AIDS  · certain medicines for anxiety or sleep  · certain medicines for depression, like amitriptyline, fluoxetine, sertraline  · certain medicines for fungal infections like ketoconazole and itraconazole  · certain medicines for seizures like carbamazepine, phenobarbital, phenytoin, primidone  · general anesthetics like halothane, isoflurane, methoxyflurane, propofol  · local anesthetics like lidocaine, pramoxine, tetracaine  · medicines that relax muscles for surgery  · narcotic medicines for pain  · phenothiazines like chlorpromazine, mesoridazine, prochlorperazine, thioridazine  This list may not describe all possible interactions. Give your health care provider a list of all the medicines, herbs, non-prescription drugs, or dietary supplements you use. Also tell them if you smoke, drink alcohol, or use illegal drugs. Some items may interact with your medicine.  What should I watch for while using this medicine?  Tell your doctor or health care professional if your symptoms do not start to get better or if they get worse.  Do not stop taking except on your doctor's advice. You may develop a severe reaction. Your doctor will tell you how much medicine to take.  You may get drowsy or dizzy. Do not drive, use machinery, or do anything that needs mental alertness until you know how this medicine affects you. To reduce the risk of dizzy and fainting spells, do not stand or sit up quickly, especially if you are an older patient. Alcohol may increase dizziness and drowsiness. Avoid alcoholic drinks.  If you are taking another medicine that also causes drowsiness, you may have more side effects. Give your health care provider a list of all  medicines you use. Your doctor will tell you how much medicine to take. Do not take more medicine than directed. Call emergency for help if you have problems breathing or unusual sleepiness.  The use of this medicine may increase the chance of suicidal thoughts or actions. Pay special attention to how you are responding while on this medicine. Any worsening of mood, or thoughts of suicide or dying should be reported to your health care professional right away.  What side effects may I notice from receiving this medicine?  Side effects that you should report to your doctor or health care professional as soon as possible:  · allergic reactions like skin rash, itching or hives, swelling of the face, lips, or tongue  · breathing problems  · confusion  · loss of balance or coordination  · signs and symptoms of low blood pressure like dizziness; feeling faint or lightheaded, falls; unusually weak or tired  · suicidal thoughts or mood changes  Side effects that usually do not require medical attention (report to your doctor or health care professional if they continue or are bothersome):  · dizziness  · headache  · tiredness  · upset stomach  This list may not describe all possible side effects. Call your doctor for medical advice about side effects. You may report side effects to FDA at 9-740-FDA-8638.  Where should I keep my medicine?  Keep out of the reach of children. This medicine can be abused. Keep your medicine in a safe place to protect it from theft. Do not share this medicine with anyone. Selling or giving away this medicine is dangerous and against the law.  This medicine may cause accidental overdose and death if taken by other adults, children, or pets. Mix any unused medicine with a substance like cat litter or coffee grounds. Then throw the medicine away in a sealed container like a sealed bag or a coffee can with a lid. Do not use the medicine after the expiration date.  Store at room temperature between  15 and 30 degrees C (59 and 86 degrees F). Protect from light. Keep container tightly closed.  NOTE: This sheet is a summary. It may not cover all possible information. If you have questions about this medicine, talk to your doctor, pharmacist, or health care provider.  © 2021 Elsevier/Gold Standard (2017-05-26 18:46:32)  Mindfulness-Based Stress Reduction  Mindfulness-based stress reduction (MBSR) is a program that helps people learn to practice mindfulness. Mindfulness is the practice of intentionally paying attention to the present moment. It can be learned and practiced through techniques such as education, breathing exercises, meditation, and yoga. MBSR includes several mindfulness techniques in one program.  MBSR works best when you understand the treatment, are willing to try new things, and can commit to spending time practicing what you learn. MBSR training may include learning about:  · How your emotions, thoughts, and reactions affect your body.  · New ways to respond to things that cause negative thoughts to start (triggers).  · How to notice your thoughts and let go of them.  · Practicing awareness of everyday things that you normally do without thinking.  · The techniques and goals of different types of meditation.  What are the benefits of MBSR?  MBSR can have many benefits, which include helping you to:  · Develop self-awareness. This refers to knowing and understanding yourself.  · Learn skills and attitudes that help you to participate in your own health care.  · Learn new ways to care for yourself.  · Be more accepting about how things are, and let things go.  · Be less judgmental and approach things with an open mind.  · Be patient with yourself and trust yourself more.  MBSR has also been shown to:  · Reduce negative emotions, such as depression and anxiety.  · Improve memory and focus.  · Change how you sense and approach pain.  · Boost your body's ability to fight infections.  · Help you  connect better with other people.  · Improve your sense of well-being.  Follow these instructions at home:    · Find a local in-person or online MBSR program.  · Set aside some time regularly for mindfulness practice.  · Find a mindfulness practice that works best for you. This may include one or more of the following:  ? Meditation. Meditation involves focusing your mind on a certain thought or activity.  ? Breathing awareness exercises. These help you to stay present by focusing on your breath.  ? Body scan. For this practice, you lie down and pay attention to each part of your body from head to toe. You can identify tension and soreness and intentionally relax parts of your body.  ? Yoga. Yoga involves stretching and breathing, and it can improve your ability to move and be flexible. It can also provide an experience of testing your body's limits, which can help you release stress.  ? Mindful eating. This way of eating involves focusing on the taste, texture, color, and smell of each bite of food. Because this slows down eating and helps you feel full sooner, it can be an important part of a weight-loss plan.  · Find a podcast or recording that provides guidance for breathing awareness, body scan, or meditation exercises. You can listen to these any time when you have a free moment to rest without distractions.  · Follow your treatment plan as told by your health care provider. This may include taking regular medicines and making changes to your diet or lifestyle as recommended.  How to practice mindfulness  To do a basic awareness exercise:  · Find a comfortable place to sit.  · Pay attention to the present moment. Observe your thoughts, feelings, and surroundings just as they are.  · Avoid placing judgment on yourself, your feelings, or your surroundings. Make note of any judgment that comes up, and let it go.  · Your mind may wander, and that is okay. Make note of when your thoughts drift, and return your  attention to the present moment.  To do basic mindfulness meditation:  · Find a comfortable place to sit. This may include a stable chair or a firm floor cushion.  ? Sit upright with your back straight. Let your arms fall next to your side with your hands resting on your legs.  ? If sitting in a chair, rest your feet flat on the floor.  ? If sitting on a cushion, cross your legs in front of you.  · Keep your head in a neutral position with your chin dropped slightly. Relax your jaw and rest the tip of your tongue on the roof of your mouth. Drop your gaze to the floor. You can close your eyes if you like.  · Breathe normally and pay attention to your breath. Feel the air moving in and out of your nose. Feel your belly expanding and relaxing with each breath.  · Your mind may wander, and that is okay. Make note of when your thoughts drift, and return your attention to your breath.  · Avoid placing judgment on yourself, your feelings, or your surroundings. Make note of any judgment or feelings that come up, let them go, and bring your attention back to your breath.  · When you are ready, lift your gaze or open your eyes. Pay attention to how your body feels after the meditation.  Where to find more information  You can find more information about MBSR from:  · Your health care provider.  · Community-based meditation centers or programs.  · Programs offered near you.  Summary  · Mindfulness-based stress reduction (MBSR) is a program that teaches you how to intentionally pay attention to the present moment. It is used with other treatments to help you cope better with daily stress, emotions, and pain.  · MBSR focuses on developing self-awareness, which allows you to respond to life stress without judgment or negative emotions.  · MBSR programs may involve learning different mindfulness practices, such as breathing exercises, meditation, yoga, body scan, or mindful eating. Find a mindfulness practice that works best for  you, and set aside time for it on a regular basis.  This information is not intended to replace advice given to you by your health care provider. Make sure you discuss any questions you have with your health care provider.  Document Revised: 02/22/2021 Document Reviewed: 04/26/2018  Elsevier Patient Education © 2021 [employer]

## 2022-01-14 ENCOUNTER — TELEPHONE (OUTPATIENT)
Dept: INTERNAL MEDICINE | Facility: CLINIC | Age: 57
End: 2022-01-14

## 2022-01-14 DIAGNOSIS — Z01.812 PRE-PROCEDURE LAB EXAM: Primary | ICD-10-CM

## 2022-01-14 NOTE — TELEPHONE ENCOUNTER
Covid scheduling called asking for Cathy to put in order for a covid test. She will have it done Monday for her PFT on wed. No need to fax they will check the chart for the order.

## 2022-01-18 DIAGNOSIS — Z01.812 PRE-PROCEDURE LAB EXAM: Primary | ICD-10-CM

## 2022-01-19 ENCOUNTER — APPOINTMENT (OUTPATIENT)
Dept: PULMONOLOGY | Facility: HOSPITAL | Age: 57
End: 2022-01-19

## 2022-01-28 DIAGNOSIS — Z20.822 ENCOUNTER FOR PREOPERATIVE SCREENING LABORATORY TESTING FOR COVID-19 VIRUS: Primary | ICD-10-CM

## 2022-01-28 DIAGNOSIS — Z01.812 ENCOUNTER FOR PREOPERATIVE SCREENING LABORATORY TESTING FOR COVID-19 VIRUS: Primary | ICD-10-CM

## 2022-02-08 ENCOUNTER — OFFICE VISIT (OUTPATIENT)
Dept: INTERNAL MEDICINE | Facility: CLINIC | Age: 57
End: 2022-02-08

## 2022-02-08 VITALS
TEMPERATURE: 97.3 F | BODY MASS INDEX: 18.12 KG/M2 | OXYGEN SATURATION: 99 % | SYSTOLIC BLOOD PRESSURE: 104 MMHG | WEIGHT: 96 LBS | HEART RATE: 109 BPM | HEIGHT: 61 IN | DIASTOLIC BLOOD PRESSURE: 66 MMHG

## 2022-02-08 DIAGNOSIS — F41.1 GENERALIZED ANXIETY DISORDER: ICD-10-CM

## 2022-02-08 DIAGNOSIS — F17.200 MODERATE DEPENDENCE ON SMOKING: ICD-10-CM

## 2022-02-08 DIAGNOSIS — Z86.59 HISTORY OF PANIC ATTACKS: ICD-10-CM

## 2022-02-08 DIAGNOSIS — F51.04 PSYCHOPHYSIOLOGICAL INSOMNIA: ICD-10-CM

## 2022-02-08 DIAGNOSIS — J41.0 SIMPLE CHRONIC BRONCHITIS: Primary | ICD-10-CM

## 2022-02-08 PROCEDURE — 99214 OFFICE O/P EST MOD 30 MIN: CPT | Performed by: NURSE PRACTITIONER

## 2022-02-08 RX ORDER — BUPROPION HYDROCHLORIDE 300 MG/1
TABLET ORAL
COMMUNITY
Start: 2022-01-28 | End: 2022-02-08

## 2022-02-08 RX ORDER — TIOTROPIUM BROMIDE INHALATION SPRAY 1.56 UG/1
2 SPRAY, METERED RESPIRATORY (INHALATION)
Qty: 4 G | Refills: 5 | Status: SHIPPED | OUTPATIENT
Start: 2022-02-08

## 2022-02-08 NOTE — PROGRESS NOTES
"Subjective   Carol Lawler is a 56 y.o. female.   Chief Complaint   Patient presents with   • Insomnia     Ambien is helping   • Nicotine Dependence       Ms. Lawler present to the office today for insomnia, emphysema, and anxiety/depression follow up. Patient reports that she was seen by psychiatrist Derrick who started her on Wellbutrin. Patient reports that the dose causes her to have tremors and feel \"crazy\". She has concerns about antidepressants making her \"crazy\" since her brother had history of complications with antidepressants. Patient had not communicated with psychiatrist about issues, but discontinued medicine. She is however, still taking the klonopin BID. Advised her at the last follow up that she would need to be tapered off klonopin if not taken over by psychiatrist. Patient is having some stressors this month. She is going to [place] to be at the sentencing trial for the murder of her daughter last year. Patient is tearful in the office discussing this. Patient appropriately taking her medicine. OTIS reviewed and UDS completed.     Insomnia controlled with Ambien. Using medicine 6 hours from her klonopin. Patient reports sleeping through the night now and denies continued nightmares with ambien use. She has tried OTC sleep therapies without significant or long-lasting change.     Carol Lawler  reports that she has been smoking cigarettes. She has a 15.00 pack-year smoking history. She has never used smokeless tobacco.. I have educated her on the risk of diseases from using tobacco products such as cancer, COPD and heart disease.     I advised her to quit and she is willing to quit. We have discussed the following method/s for tobacco cessation:  Education Material Counseling [place] OTC Cessation Products.  Together we have set a quit date for 1 week from today.  She will follow up with me in 1 week or sooner to check on her progress.    I spent 5 minutes counseling the patient.        "     The following portions of the patient's history were reviewed and updated as appropriate: allergies, current medications, past family history, past medical history, past social history, past surgical history and problem list.    Review of Systems   Constitutional: Negative for activity change, appetite change, fatigue, fever, unexpected weight gain and unexpected weight loss.   HENT: Negative for swollen glands, trouble swallowing and voice change.    Eyes: Negative for blurred vision and visual disturbance.   Respiratory: Negative for cough and shortness of breath.    Cardiovascular: Negative for chest pain, palpitations and leg swelling.   Gastrointestinal: Negative for abdominal pain, constipation, diarrhea, nausea, vomiting and indigestion.   Endocrine: Negative for cold intolerance, heat intolerance, polydipsia and polyphagia.   Genitourinary: Negative for dysuria and frequency.   Musculoskeletal: Negative for arthralgias, back pain, joint swelling and neck pain.   Skin: Negative for color change, rash and skin lesions.   Neurological: Negative for dizziness, weakness, headache, memory problem and confusion.   Hematological: Does not bruise/bleed easily.   Psychiatric/Behavioral: Positive for decreased concentration, sleep disturbance, depressed mood and stress. Negative for agitation, hallucinations and suicidal ideas. The patient is nervous/anxious.        Objective   Past Medical History:   Diagnosis Date   • Anxiety    • COPD (chronic obstructive pulmonary disease) (HCC)    • Insomnia    • Osteoporosis       Past Surgical History:   Procedure Laterality Date   • TONSILLECTOMY     • TUBAL ABDOMINAL LIGATION          Current Outpatient Medications:   •  albuterol sulfate  (90 Base) MCG/ACT inhaler, Inhale 2 puffs Every 4 (Four) Hours As Needed for Wheezing., Disp: 18 g, Rfl: 3  •  alendronate (FOSAMAX) 70 MG tablet, Take 1 tablet by mouth 1 (One) Time Per Week., Disp: 12 tablet, Rfl: 2  •   buprenorphine-naloxone (SUBOXONE) 8-2 MG film film, DISSOLVE 2 FILMS UNDER THE TONGUE ONCE DAILY, Disp: , Rfl:   •  clonazePAM (KlonoPIN) 0.5 MG tablet, Take 1 tablet by mouth 2 (Two) Times a Day As Needed for Anxiety., Disp: 60 tablet, Rfl: 0  •  nitroglycerin (NITROSTAT) 0.4 MG SL tablet, Take 0.4 mg by mouth As Needed., Disp: , Rfl:   •  omeprazole (PrilOSEC) 20 MG capsule, Take 1 capsule by mouth Daily., Disp: 90 capsule, Rfl: 1  •  Tiotropium Bromide Monohydrate (Spiriva Respimat) 1.25 MCG/ACT aerosol solution inhaler, Inhale 2 puffs Daily., Disp: 4 g, Rfl: 5  •  zolpidem (AMBIEN) 5 MG tablet, Take 1 tablet by mouth Every Evening., Disp: 30 tablet, Rfl: 0  •  prazosin (MINIPRESS) 1 MG capsule, , Disp: , Rfl:       Vitals:    02/08/22 1029   BP: 104/66   Pulse: 109   Temp: 97.3 °F (36.3 °C)   SpO2: 99%         02/08/22  1029   Weight: 43.5 kg (96 lb)       Body mass index is 18.14 kg/m².    Physical Exam  Vitals and nursing note reviewed.   Constitutional:       Appearance: She is well-developed.   HENT:      Head: Normocephalic and atraumatic.      Right Ear: Hearing and external ear normal.      Left Ear: Hearing and external ear normal.      Nose: Nose normal.      Mouth/Throat:      Mouth: Mucous membranes are moist.   Eyes:      General: Lids are normal. Lids are everted, no foreign bodies appreciated. Vision grossly intact.      Conjunctiva/sclera: Conjunctivae normal.      Pupils: Pupils are equal, round, and reactive to light.   Neck:      Thyroid: No thyromegaly.   Cardiovascular:      Rate and Rhythm: Normal rate and regular rhythm.      Pulses: Normal pulses.      Heart sounds: Normal heart sounds. No murmur heard.      Pulmonary:      Effort: Pulmonary effort is normal.      Breath sounds: Normal breath sounds.      Comments: Coarse breath sounds throughout, much improved since starting spiriva  Abdominal:      General: Bowel sounds are normal.      Palpations: Abdomen is soft.   Musculoskeletal:       Cervical back: Normal range of motion and neck supple.      Right lower leg: No edema.      Left lower leg: No edema.   Lymphadenopathy:      Cervical: No cervical adenopathy.   Skin:     General: Skin is warm and dry.      Capillary Refill: Capillary refill takes less than 2 seconds.   Neurological:      General: No focal deficit present.      Mental Status: She is alert and oriented to person, place, and time.      Deep Tendon Reflexes: Reflexes are normal and symmetric.   Psychiatric:         Attention and Perception: Attention normal.         Mood and Affect: Mood is anxious and depressed. Affect is tearful.         Behavior: Behavior normal.         Thought Content: Thought content normal.         Cognition and Memory: Cognition and memory normal.               Assessment/Plan   Diagnoses and all orders for this visit:    1. Simple chronic bronchitis (HCC) (Primary)  -     Tiotropium Bromide Monohydrate (Spiriva Respimat) 1.25 MCG/ACT aerosol solution inhaler; Inhale 2 puffs Daily.  Dispense: 4 g; Refill: 5    2. Moderate dependence on smoking    3. Psychophysiological insomnia    4. History of panic attacks    5. Generalized anxiety disorder      Continue spiriva and albuterol inhaler as needed for bronchitis/emphysema. Need to complete PFT when she returns from [facility] if not already completed within the last year.     Insomnia controlled with current therapy continue use. Advised to avoid use close to klonopin.     Anxiety and panic worsening with situational stressors as discussed in our office visit today. Continue klonipin for 1 more month. If not taken over by psychatrist. Will work on tapering her down over the next 3 months while psychiatrist adjust her antidepressants. Advised her today to call their office to discuss dose change on the wellbutrin or switching to something different. Discussed benefits of wellbutrin and smoking cessation.

## 2022-02-09 ENCOUNTER — TELEPHONE (OUTPATIENT)
Dept: INTERNAL MEDICINE | Facility: CLINIC | Age: 57
End: 2022-02-09

## 2022-02-09 DIAGNOSIS — F51.04 PSYCHOPHYSIOLOGICAL INSOMNIA: ICD-10-CM

## 2022-02-09 DIAGNOSIS — F41.1 GENERALIZED ANXIETY DISORDER: ICD-10-CM

## 2022-02-09 RX ORDER — ZOLPIDEM TARTRATE 5 MG/1
5 TABLET ORAL EVERY EVENING
Qty: 30 TABLET | Refills: 0 | Status: SHIPPED | OUTPATIENT
Start: 2022-02-09 | End: 2022-03-03 | Stop reason: SDUPTHER

## 2022-02-09 RX ORDER — CLONAZEPAM 0.5 MG/1
0.5 TABLET ORAL 2 TIMES DAILY PRN
Qty: 60 TABLET | Refills: 0 | Status: SHIPPED | OUTPATIENT
Start: 2022-02-09 | End: 2022-03-03 | Stop reason: SDUPTHER

## 2022-02-09 NOTE — TELEPHONE ENCOUNTER
Tried to call pt on both numbers to let her know scripts have been sent and confirmed by pharmacy

## 2022-02-09 NOTE — TELEPHONE ENCOUNTER
PATIENT CALLED TO FOLLOW UP. PATIENT WOULD LIKE HER MEDICATIONS BE SENT OVER TO PHARMACY ASAP. [phone]

## 2022-02-09 NOTE — TELEPHONE ENCOUNTER
This was suppose to be called in yesterday after appt. Pt needs this as she is going out of town 10:30am today and won't be back until Tuesday.

## 2022-03-03 DIAGNOSIS — F41.1 GENERALIZED ANXIETY DISORDER: ICD-10-CM

## 2022-03-03 DIAGNOSIS — K29.30 CHRONIC SUPERFICIAL GASTRITIS WITHOUT BLEEDING: ICD-10-CM

## 2022-03-03 DIAGNOSIS — F51.04 PSYCHOPHYSIOLOGICAL INSOMNIA: ICD-10-CM

## 2022-03-03 NOTE — TELEPHONE ENCOUNTER
Caller: Carol Lawler (Self)    Relationship: SELF     Best call back number:   [phone] (M)    Requested Prescriptions:     STATES SHE NEEDS REFILLS OF   SPIRIVA INHALER AND BONE MEDICATION       zolpidem (AMBIEN) 5 MG tablet  5 mg, Every Evening     clonazePAM (KlonoPIN) 0.5 MG tablet  0.5 mg, 2 Times Daily PRN     omeprazole (PrilOSEC) 20 MG capsule  20 mg, Daily             Pharmacy where request should be sent:    [address] - [phone]  - [phone] FX  [phone]  Additional details provided by patient: STATES SHE HAS CALLED THE PHARMACY AND THEY DO NOT HAVE HER SCRIPTS   Does the patient have less than a 3 day supply:  [x] Yes  [] No    Ervin Gtz Rep   03/03/22 13:50 CST

## 2022-03-05 RX ORDER — ZOLPIDEM TARTRATE 5 MG/1
5 TABLET ORAL NIGHTLY PRN
Qty: 15 TABLET | Refills: 0 | Status: SHIPPED | OUTPATIENT
Start: 2022-03-08

## 2022-03-05 RX ORDER — CLONAZEPAM 0.5 MG/1
0.5 TABLET ORAL DAILY PRN
Qty: 30 TABLET | Refills: 0 | Status: SHIPPED | OUTPATIENT
Start: 2022-03-08

## 2022-03-05 RX ORDER — OMEPRAZOLE 20 MG/1
20 CAPSULE, DELAYED RELEASE ORAL DAILY
Qty: 90 CAPSULE | Refills: 1 | Status: SHIPPED | OUTPATIENT
Start: 2022-03-05

## 2022-03-05 NOTE — TELEPHONE ENCOUNTER
I have discussed with patient at last office visit that we will be dramatically reducing her medicine if she does not have her medicine continued by psychiatrist.  I will be reducing her medicine today as discussed. She will get half medicine and needs to use only as needed. Will not get a refill until May.

## 2022-04-01 DIAGNOSIS — F51.04 PSYCHOPHYSIOLOGICAL INSOMNIA: ICD-10-CM

## 2022-04-01 DIAGNOSIS — F41.1 GENERALIZED ANXIETY DISORDER: ICD-10-CM

## 2022-04-01 RX ORDER — ZOLPIDEM TARTRATE 5 MG/1
5 TABLET ORAL NIGHTLY PRN
Qty: 15 TABLET | Refills: 0 | OUTPATIENT
Start: 2022-04-01

## 2022-04-01 RX ORDER — CLONAZEPAM 0.5 MG/1
0.5 TABLET ORAL DAILY PRN
Qty: 30 TABLET | Refills: 0 | OUTPATIENT
Start: 2022-04-01

## 2022-04-01 NOTE — TELEPHONE ENCOUNTER
Caller: Carol Lawler    Relationship: Self    Requested Prescriptions:   Requested Prescriptions     Pending Prescriptions Disp Refills   • clonazePAM (KlonoPIN) 0.5 MG tablet 30 tablet 0     Sig: Take 1 tablet by mouth Daily As Needed for Anxiety.   • zolpidem (AMBIEN) 5 MG tablet 15 tablet 0     Sig: Take 1 tablet by mouth At Night As Needed for Sleep.        Pharmacy where request should be sent: [facility] [address] - [phone]  - [phone] YANNICK Hughes, PCT   04/01/22 09:59 CDT

## 2022-04-01 NOTE — TELEPHONE ENCOUNTER
Patient states that since we cannot fill her Klonopin today, she will be going back to her previous PCP Dr. Griffin.

## 2022-04-01 NOTE — TELEPHONE ENCOUNTER
Tried calling patient but wrong # is listed.    Called patient's father. They are currently sharing a phone #. I gave him the message that Cathy wanted the patient to see giovanni for these medications(per Feb visit with Cathy.) I informed him that Cathy is no longer at this practice so these medications will have to come from [facility].    He voiced understanding and said he would pass the message to his daughter.

## 2022-04-04 ENCOUNTER — TELEPHONE (OUTPATIENT)
Dept: INTERNAL MEDICINE | Facility: CLINIC | Age: 57
End: 2022-04-04

## 2022-04-04 NOTE — TELEPHONE ENCOUNTER
"Pt called stating she is having withdrawals as she ran out of her Klonopin and Ambien \"about 2 days ago\" - vomiting, diarrhea, shaking.  Please review most recent telephone messages and Cathy's notes that she was dramatically cutting her medication back and would not get refills until May.  Patient says Derrick at Dr. Bowen's office put her on an anti-depressant that she could not take because it made her sick and he would not refill her medication.  She wants to remain in this office, does not want to return to Dr. Griffin, as chart states on 4/1.  I told her I did not know what the answer would be for her today, but would ask and call her back.  She commented that she would \"likely end up in the hospital again\".  Her father is talking in the background and she gave his number as the callback - [phone].  She lives with her brother and father.  Please advise.  "

## 2022-04-04 NOTE — TELEPHONE ENCOUNTER
Patient informed and voiced understanding.  It sounded like she may have been in a car at the time of the call, but I am not certain.

## 2022-04-04 NOTE — TELEPHONE ENCOUNTER
There is not a provider here today that can sign for Ambien or Klonopin.  Needs to go to ER if symptomatic.

## 2022-04-04 NOTE — TELEPHONE ENCOUNTER
Pt just called again stating she is having panic attacks.  I advised that she go to  ER for psychiatric care and she says she doesn't have a ride as her father won't be back home until later this afternoon.  I told her that I would call her back once provider here had reviewed her chart and advised.

## 2022-04-06 ENCOUNTER — TELEPHONE (OUTPATIENT)
Dept: INTERNAL MEDICINE | Facility: CLINIC | Age: 57
End: 2022-04-06

## 2022-04-06 DIAGNOSIS — F41.1 GENERALIZED ANXIETY DISORDER: ICD-10-CM

## 2022-04-06 DIAGNOSIS — F51.04 PSYCHOPHYSIOLOGICAL INSOMNIA: ICD-10-CM

## 2022-04-06 RX ORDER — CLONAZEPAM 0.5 MG/1
0.5 TABLET ORAL DAILY PRN
Qty: 5 TABLET | Refills: 0 | OUTPATIENT
Start: 2022-04-06

## 2022-04-06 NOTE — TELEPHONE ENCOUNTER
Patient called Dr Griffin office to see if her appointment on 4/11/22 could be moved up to today. Patient stated she was going to go through withdrawal if she didn't get her medication.Klonopin. Patient stated her last refill was the 29th of Feb. And she was out. We have no openings till her appointment 4/11/22 I told her to keep her appointment time and if she felt she was having s/s of withdrawal go to ED.

## 2022-04-06 NOTE — TELEPHONE ENCOUNTER
"Patient requesting refill of Klonopin. She will be seeing Dr Griffin on Monday since Cathy has left,  but is needing a refill until then.     She went to the ER because of her symptoms but they would not fill for her.   She states she had a seizure last night and the night before. Also, having vomiting and diarrhea. I advised patient with those symptoms she needs to go to the ER. She said \" they won't do anything for me\" I told her I would send a message to Dr Vasques and see if she will fill until she sees Dr Griffin on Monday.   "

## 2022-04-14 ENCOUNTER — TELEPHONE (OUTPATIENT)
Dept: INTERNAL MEDICINE | Facility: CLINIC | Age: 57
End: 2022-04-14

## 2022-04-14 ENCOUNTER — OFFICE VISIT (OUTPATIENT)
Dept: INTERNAL MEDICINE | Facility: CLINIC | Age: 57
End: 2022-04-14

## 2022-04-14 VITALS
WEIGHT: 95 LBS | HEART RATE: 94 BPM | HEIGHT: 61 IN | SYSTOLIC BLOOD PRESSURE: 110 MMHG | TEMPERATURE: 96.8 F | DIASTOLIC BLOOD PRESSURE: 68 MMHG | OXYGEN SATURATION: 96 % | BODY MASS INDEX: 17.94 KG/M2

## 2022-04-14 DIAGNOSIS — R06.02 SHORTNESS OF BREATH: Primary | ICD-10-CM

## 2022-04-14 PROCEDURE — 99213 OFFICE O/P EST LOW 20 MIN: CPT

## 2022-04-14 NOTE — PATIENT INSTRUCTIONS
Please call your insurance and find out what inhaler they will cover since Spiriva is not covered.  Please call and make an appointment with Derrick for management of anxiety and insomnia  Please make sure to get DEXA scan and mammogram done prior to 5/17/22  Call [facility] to schedule, [phone]  You will be called for future appointment to get PFTs done as well as to see pulmonology.

## 2022-04-14 NOTE — PROGRESS NOTES
"Subjective   Carol Lawler is a 56 y.o. female.   Chief Complaint   Patient presents with   • COPD     Follow up from 02/08/2022; pt missed Respiratory apt will call to r/s ; unable to get Spiriva as insurance will no longer pay for medication; pt says she is having a worse time with breathing       History of Present Illness   Carol Lawler is a 56 y.o. female seen here today for increased shortness of breath. She describes it as intermittent chest tightness, difficulty getting her breath, and resulting activity intolerance. She states that she missed appointment to pulmonology  r/t family issues as she relies on family to drive her to appointments. Her shortness of breath is progressively getting worse, last 3 months especially, no identifiable causes for this except she states insurance wont pay for Spiriva and she has not taken in over 2 months. So she had been taking 3 puffs BID on albuterol inhaler whether she is short of breath at the time or not. She states that the SOA is making her anxiety worse and the anxiety makes her SOA worse and it has been a continuous viscous cycle.   She also reports that she has been out of Ambien for 1.5 weeks is requesting refill for this. She is aware that psychiatry is responsible for managing this. She said she did not make it to her last appointment there.   Not eating well, states that her dentures do not fit her well any more and her appetite is not good.   She reports that she tried to quit smoking, is currently smoking 1 pack daily. It makes her lungs \"feel like they're burning\". She wants to quit but feels she is unable to with all of her anxiety and current issues. She states understanding that this causes her lung condition to be more severe.     The following portions of the patient's history were reviewed and updated as appropriate: allergies, current medications, past family history, past medical history, past social history, past surgical history and problem " list.    Review of Systems    Objective   Past Medical History:   Diagnosis Date   • Anxiety    • COPD (chronic obstructive pulmonary disease) (HCC)    • Insomnia    • Osteoporosis       Past Surgical History:   Procedure Laterality Date   • TONSILLECTOMY     • TUBAL ABDOMINAL LIGATION          Current Outpatient Medications:   •  albuterol sulfate  (90 Base) MCG/ACT inhaler, Inhale 2 puffs Every 4 (Four) Hours As Needed for Wheezing., Disp: 18 g, Rfl: 3  •  alendronate (FOSAMAX) 70 MG tablet, Take 1 tablet by mouth 1 (One) Time Per Week., Disp: 12 tablet, Rfl: 2  •  buprenorphine-naloxone (SUBOXONE) 8-2 MG film film, DISSOLVE 2 FILMS UNDER THE TONGUE ONCE DAILY, Disp: , Rfl:   •  clonazePAM (KlonoPIN) 0.5 MG tablet, Take 1 tablet by mouth Daily As Needed for Anxiety., Disp: 30 tablet, Rfl: 0  •  nitroglycerin (NITROSTAT) 0.4 MG SL tablet, Take 0.4 mg by mouth As Needed., Disp: , Rfl:   •  omeprazole (PrilOSEC) 20 MG capsule, Take 1 capsule by mouth Daily., Disp: 90 capsule, Rfl: 1  •  prazosin (MINIPRESS) 1 MG capsule, , Disp: , Rfl:   •  Tiotropium Bromide Monohydrate (Spiriva Respimat) 1.25 MCG/ACT aerosol solution inhaler, Inhale 2 puffs Daily., Disp: 4 g, Rfl: 5  •  zolpidem (AMBIEN) 5 MG tablet, Take 1 tablet by mouth At Night As Needed for Sleep., Disp: 15 tablet, Rfl: 0      Vitals:    04/14/22 0808   BP: 110/68   Pulse: 94   Temp: 96.8 °F (36 °C)   SpO2: 96%         04/14/22  0808   Weight: 43.1 kg (95 lb)       Body mass index is 17.95 kg/m².    Physical Exam  Vitals and nursing note reviewed.   Constitutional:       General: She is not in acute distress.     Appearance: She is ill-appearing. She is not diaphoretic.   HENT:      Head: Normocephalic and atraumatic.      Right Ear: Tympanic membrane, ear canal and external ear normal. There is no impacted cerumen.      Left Ear: Tympanic membrane, ear canal and external ear normal. There is no impacted cerumen.      Nose: Nose normal. No congestion or  rhinorrhea.      Mouth/Throat:      Mouth: Mucous membranes are moist.      Pharynx: Oropharynx is clear. No oropharyngeal exudate or posterior oropharyngeal erythema.      Comments: No teeth present  Eyes:      General: No scleral icterus.        Right eye: No discharge.         Left eye: No discharge.      Extraocular Movements: Extraocular movements intact.      Conjunctiva/sclera: Conjunctivae normal.      Pupils: Pupils are equal, round, and reactive to light.   Neck:      Vascular: No carotid bruit.   Cardiovascular:      Rate and Rhythm: Normal rate and regular rhythm.      Pulses: Normal pulses.      Heart sounds: Normal heart sounds. No murmur heard.    No friction rub. No gallop.   Pulmonary:      Effort: Pulmonary effort is normal. No respiratory distress.      Breath sounds: No stridor. Examination of the right-lower field reveals decreased breath sounds. Examination of the left-lower field reveals decreased breath sounds. Decreased breath sounds present. No wheezing, rhonchi or rales.   Chest:      Chest wall: No tenderness.   Abdominal:      General: Abdomen is flat. Bowel sounds are normal. There is no distension.      Palpations: Abdomen is soft. There is no mass.      Tenderness: There is no abdominal tenderness. There is no guarding or rebound.      Hernia: No hernia is present.      Comments: Concave      Musculoskeletal:         General: No swelling, tenderness, deformity or signs of injury. Normal range of motion.      Cervical back: Normal range of motion and neck supple. No rigidity or tenderness.      Right lower leg: No edema.      Left lower leg: No edema.   Lymphadenopathy:      Cervical: No cervical adenopathy.   Skin:     General: Skin is warm and dry.      Capillary Refill: Capillary refill takes less than 2 seconds.      Coloration: Skin is pale. Skin is not jaundiced.      Findings: No bruising, erythema, lesion or rash.   Neurological:      General: No focal deficit present.       Mental Status: She is alert and oriented to person, place, and time. Mental status is at baseline.      Cranial Nerves: No cranial nerve deficit.      Sensory: No sensory deficit.      Motor: Weakness present.      Coordination: Coordination normal.      Gait: Gait normal.   Psychiatric:         Attention and Perception: Attention normal.         Mood and Affect: Mood is anxious. Affect is tearful.         Speech: Speech normal.         Behavior: Behavior normal.         Thought Content: Thought content normal.         Cognition and Memory: Cognition normal.               Assessment/Plan   Diagnoses and all orders for this visit:    1. Shortness of breath (Primary)  -     Full Pulmonary Function Test With Bronchodilator; Future  -     Ambulatory Referral to Pulmonology      Ms. Lawler is assessed to be quite anxious and became tearful several times during exam. Lungs are clear except in bases. Vitals are stable. Patient states that she does have a portable pulse ox at home but does not use it regularly. The timing of her increased severity of shortness of air and discontinuation of Spiriva use correlates. I urged the patient to obtain this inhaler from her pharmacy if at all possible and to let us know what inhaler will be covered by her insurance.  Patient states that she will call her insurance and see what form of inhaler similar to Spiriva they will cover and she will call the office to let us know. She states that she will go to the pharmacy and see how much she has to pay for the Spiriva and will see if her family can help her financially with this so that she does have a long-acting inhaler that she can use while we figure out what we can order that will be covered. She was educated to use her albuterol inhaler as ordered (meaning as needed, not scheduled BID 3 puffs like she reports she has been doing). She was educated that over-use of albuterol can increase feelings of anxiety and cause tachycardia.      Also encouraged her and stressed the importance of making it to her scheduled appointments. Repeat referral made to Pulmonology and order placed for PFTs.     Also expressed to her the importance of making an appointment and keeping with her psychiatrist to help further manage her issues with anxiety and insomnia. She was disappointed to not have her Ambien renewed but expressed understanding that she would have to be seen by them for help with management of this.     Notified patient that she had a DEXA scan and mammogram scheduled from last year, informed that order would not  until 22 and encouraged her to get this done. Gave her the number for the imaging center.     Also encouraged her to increase her intake of soft-calorie-protein dense foods, she was agreeable to this.     Also discussed in length about smoking cessation as she expressed awareness of its role in causing her increased shortness of breath. She was encouraged to start weaning herself off since prior complete cessation did not work for her. We will follow-up on her progress at her next visit in 4 weeks.   Patient was agreeable to meeting again in 4 weeks to assess progress in shortness of air, getting tests performed, and smoking cessation.

## 2022-04-14 NOTE — TELEPHONE ENCOUNTER
Pt called stating per insurance that the provider will need to call and do a Authorization for refill and strength for Spiriva. Once this is done then they will cover.

## 2022-05-03 DIAGNOSIS — Z20.822 ENCOUNTER FOR PREPROCEDURE SCREENING LABORATORY TESTING FOR COVID-19: Primary | ICD-10-CM

## 2022-05-03 DIAGNOSIS — Z01.812 ENCOUNTER FOR PREPROCEDURE SCREENING LABORATORY TESTING FOR COVID-19: Primary | ICD-10-CM

## 2022-06-14 ENCOUNTER — NURSE TRIAGE (OUTPATIENT)
Dept: OTHER | Facility: CLINIC | Age: 57
End: 2022-06-14

## 2022-06-14 NOTE — TELEPHONE ENCOUNTER
Received call from Alice Lock at [facility] with [employer] Complaint. Subjective: Caller states \"Had an episode a couple weeks ago and was sweaty, confused, dizzy and did eat a lot of sugar that day. No hx of DM but brother and mother do. He seen me and he checked my sugar and it showed 900. I get dizzy at times but not like that. I slowed the candy and coke and I do sugar free water. I do get dizzy and shaky at times still. I have COPD really bad and in 2006 I had a MI. At night at times I feel like I quit breathing. I have been out of Ambien and Klonopin. I shake sometimes like I have a seizure. I ran out and stopped it abruptly. It was a month ago that I ran out. It is like I cant breath or talk. Once he sits me up I am shaking and cant talk for a few minutes. I have had a bad HA. Last episode was last night. I don't eat a lot, no meat due to teeth. I have been nauseous but no vomiting. I only weigh about 90lbs. I have been having CP but Ill come see yall and you can check me out and see if it is my heart. I smoke. It hurts in my shoulder and arm but that is not new. When I have episodes at night I am really confused and it takes 10-15 minutes til I know what's happened. No confusion right now. These HA's just started and I have never had HA's before. I have wet myself during shaking episodes. \"      The symptoms above have all started since pt abruptly stopped Klonopin and Ambien due to running out    Pt has been on Klonopin since 22yo. NO seizure hx   Hx of COPD- needs meds   MI- 2006     Current Symptoms: dizzy, shakes like seizure, stopped Klonipin, SOB , nausea, reduced appetite, HA, fatigued     Onset: 1 month ago; last episode was last night     Associated Symptoms: diarrhea    Pain Severity: just took ibuprofen about an hour ago but it was so bad I had to lay down.  None currently    Temperature: denies     What has been tried: ibuprofen       Recommended disposition: Go to ED Now - Pt is with her brother currently and will go to ED when her dad gets home. Pt advised that EMS is an option if needed. Pt verbalized understanding. Care advice provided, patient verbalizes understanding; denies any other questions or concerns; instructed to call back for any new or worsening symptoms. Patient/caller agrees to proceed to [id number] [facility]  Emergency Department     Attention Provider: Thank you for allowing me to participate in the care of your patient. The patient was connected to triage in response to information provided to the ECC/PSC. Please do not respond through this encounter as the response is not directed to a shared pool.           Reason for Disposition   Headache or vomiting    Protocols used: CONFUSION - DELIRIUM-ADULT-OH

## 2022-06-16 ENCOUNTER — HOSPITAL ENCOUNTER (EMERGENCY)
Age: 57
Discharge: HOME | End: 2022-06-16
Attending: EMERGENCY MEDICINE
Payer: MEDICAID

## 2022-06-16 ENCOUNTER — APPOINTMENT (OUTPATIENT)
Dept: CT IMAGING | Age: 57
End: 2022-06-16
Payer: MEDICAID

## 2022-06-16 ENCOUNTER — APPOINTMENT (OUTPATIENT)
Dept: GENERAL RADIOLOGY | Age: 57
End: 2022-06-16
Payer: MEDICAID

## 2022-06-16 VITALS
DIASTOLIC BLOOD PRESSURE: 83 MMHG | TEMPERATURE: 98.4 F | WEIGHT: 94 LBS | RESPIRATION RATE: 16 BRPM | HEIGHT: 61 IN | BODY MASS INDEX: 17.75 KG/M2 | HEART RATE: 81 BPM | OXYGEN SATURATION: 96 % | SYSTOLIC BLOOD PRESSURE: 130 MMHG

## 2022-06-16 DIAGNOSIS — F41.1 GENERALIZED ANXIETY DISORDER: ICD-10-CM

## 2022-06-16 DIAGNOSIS — F13.930 BENZODIAZEPINE WITHDRAWAL WITHOUT COMPLICATION (HCC): Primary | ICD-10-CM

## 2022-06-16 LAB
ALBUMIN SERPL-MCNC: 4.4 G/DL (ref 3.5–5.2)
ALP BLD-CCNC: 105 U/L (ref 35–104)
ALT SERPL-CCNC: 12 U/L (ref 5–33)
AMPHETAMINE SCREEN, URINE: NEGATIVE
ANION GAP SERPL CALCULATED.3IONS-SCNC: 8 MMOL/L (ref 7–19)
AST SERPL-CCNC: 19 U/L (ref 5–32)
BARBITURATE SCREEN URINE: NEGATIVE
BASOPHILS ABSOLUTE: 0.1 K/UL (ref 0–0.2)
BASOPHILS RELATIVE PERCENT: 0.7 % (ref 0–1)
BENZODIAZEPINE SCREEN, URINE: NEGATIVE
BILIRUB SERPL-MCNC: <0.2 MG/DL (ref 0.2–1.2)
BUN BLDV-MCNC: 11 MG/DL (ref 6–20)
CALCIUM SERPL-MCNC: 9.9 MG/DL (ref 8.6–10)
CANNABINOID SCREEN URINE: NEGATIVE
CHLORIDE BLD-SCNC: 100 MMOL/L (ref 98–111)
CO2: 30 MMOL/L (ref 22–29)
COCAINE METABOLITE SCREEN URINE: NEGATIVE
CREAT SERPL-MCNC: 0.6 MG/DL (ref 0.5–0.9)
EOSINOPHILS ABSOLUTE: 0.1 K/UL (ref 0–0.6)
EOSINOPHILS RELATIVE PERCENT: 0.9 % (ref 0–5)
GFR AFRICAN AMERICAN: >59
GFR NON-AFRICAN AMERICAN: >60
GLUCOSE BLD-MCNC: 89 MG/DL (ref 74–109)
HCT VFR BLD CALC: 43.1 % (ref 37–47)
HEMOGLOBIN: 13.5 G/DL (ref 12–16)
IMMATURE GRANULOCYTES #: 0 K/UL
INR BLD: 0.88 (ref 0.88–1.18)
LYMPHOCYTES ABSOLUTE: 2.3 K/UL (ref 1.1–4.5)
LYMPHOCYTES RELATIVE PERCENT: 24.8 % (ref 20–40)
Lab: NORMAL
MCH RBC QN AUTO: 28.4 PG (ref 27–31)
MCHC RBC AUTO-ENTMCNC: 31.3 G/DL (ref 33–37)
MCV RBC AUTO: 90.5 FL (ref 81–99)
MONOCYTES ABSOLUTE: 0.8 K/UL (ref 0–0.9)
MONOCYTES RELATIVE PERCENT: 8.4 % (ref 0–10)
NEUTROPHILS ABSOLUTE: 6 K/UL (ref 1.5–7.5)
NEUTROPHILS RELATIVE PERCENT: 64.9 % (ref 50–65)
OPIATE SCREEN URINE: NEGATIVE
PDW BLD-RTO: 12.8 % (ref 11.5–14.5)
PLATELET # BLD: 402 K/UL (ref 130–400)
PMV BLD AUTO: 8.5 FL (ref 9.4–12.3)
POTASSIUM REFLEX MAGNESIUM: 4.4 MMOL/L (ref 3.5–5)
PROTHROMBIN TIME: 11.8 SEC (ref 12–14.6)
RBC # BLD: 4.76 M/UL (ref 4.2–5.4)
REASON FOR REJECTION: NORMAL
REJECTED TEST: NORMAL
SODIUM BLD-SCNC: 138 MMOL/L (ref 136–145)
TOTAL PROTEIN: 6.9 G/DL (ref 6.6–8.7)
TROPONIN: <0.01 NG/ML (ref 0–0.03)
WBC # BLD: 9.2 K/UL (ref 4.8–10.8)

## 2022-06-16 PROCEDURE — 85025 COMPLETE CBC W/AUTO DIFF WBC: CPT

## 2022-06-16 PROCEDURE — 84484 ASSAY OF TROPONIN QUANT: CPT

## 2022-06-16 PROCEDURE — 70450 CT HEAD/BRAIN W/O DYE: CPT | Performed by: RADIOLOGY

## 2022-06-16 PROCEDURE — 85610 PROTHROMBIN TIME: CPT

## 2022-06-16 PROCEDURE — 80053 COMPREHEN METABOLIC PANEL: CPT

## 2022-06-16 PROCEDURE — 70450 CT HEAD/BRAIN W/O DYE: CPT

## 2022-06-16 PROCEDURE — 99285 EMERGENCY DEPT VISIT HI MDM: CPT

## 2022-06-16 PROCEDURE — 93005 ELECTROCARDIOGRAM TRACING: CPT | Performed by: EMERGENCY MEDICINE

## 2022-06-16 PROCEDURE — 6370000000 HC RX 637 (ALT 250 FOR IP): Performed by: EMERGENCY MEDICINE

## 2022-06-16 PROCEDURE — 71045 X-RAY EXAM CHEST 1 VIEW: CPT

## 2022-06-16 PROCEDURE — 36415 COLL VENOUS BLD VENIPUNCTURE: CPT

## 2022-06-16 PROCEDURE — 80307 DRUG TEST PRSMV CHEM ANLYZR: CPT

## 2022-06-16 PROCEDURE — 71045 X-RAY EXAM CHEST 1 VIEW: CPT | Performed by: RADIOLOGY

## 2022-06-16 RX ORDER — LORAZEPAM 0.5 MG/1
0.5 TABLET ORAL ONCE
Status: COMPLETED | OUTPATIENT
Start: 2022-06-16 | End: 2022-06-16

## 2022-06-16 RX ORDER — ZOLPIDEM TARTRATE 5 MG/1
5 TABLET ORAL NIGHTLY PRN
Qty: 5 TABLET | Refills: 0 | Status: SHIPPED | OUTPATIENT
Start: 2022-06-16 | End: 2022-06-21

## 2022-06-16 RX ORDER — TIOTROPIUM BROMIDE 18 UG/1
18 CAPSULE ORAL; RESPIRATORY (INHALATION) DAILY
Qty: 90 CAPSULE | Refills: 1 | Status: SHIPPED | OUTPATIENT
Start: 2022-06-16

## 2022-06-16 RX ORDER — CLONAZEPAM 0.5 MG/1
0.5 TABLET ORAL 2 TIMES DAILY
Qty: 30 TABLET | Refills: 0 | Status: SHIPPED | OUTPATIENT
Start: 2022-06-16 | End: 2022-07-01

## 2022-06-16 RX ADMIN — LORAZEPAM 0.5 MG: 0.5 TABLET ORAL at 20:43

## 2022-06-16 ASSESSMENT — ENCOUNTER SYMPTOMS
DIARRHEA: 1
NAUSEA: 0
EYES NEGATIVE: 1
SHORTNESS OF BREATH: 1
VOMITING: 0
COUGH: 1
ABDOMINAL PAIN: 0

## 2022-06-16 ASSESSMENT — PAIN - FUNCTIONAL ASSESSMENT
PAIN_FUNCTIONAL_ASSESSMENT: NONE - DENIES PAIN
PAIN_FUNCTIONAL_ASSESSMENT: NONE - DENIES PAIN

## 2022-06-16 NOTE — ED PROVIDER NOTES
[address] [facility] EMERGENCY DEPT  eMERGENCY dEPARTMENT eNCOUnter      Pt Name: Belkis Mcdonald  MRN: [id number]  [place] 1965  Date of evaluation: 6/16/2022  Provider: Osmin Dawson MD    CHIEF COMPLAINT       Chief Complaint   Patient presents with    Other     Pt arrived to the ed with c/o \"I think I've been having seizures. My brother wakes up because I'm shaking and I've peed myself. This has happened every night since Monday, but it's been going on for about 3.5 weeks. \"          HISTORY OF PRESENT ILLNESS   (Location/Symptom, Timing/Onset,Context/Setting, Quality, Duration, Modifying Factors, Severity)  Note limiting factors. Belkis Mcdonald is a 49 y.o. female who presents to the emergency department with history of questionable seizures. This is a 81-year-old female who comes in for evaluation. She states for the last several days (maybe about 2 weeks) she has been feeling intermittently sweaty, shaky and disoriented. She states that she just checked her sugar on these occasions and it has been okay. She states that 1 night last week and then 2 nights this week she had what her brother thought was a seizure. She \"shakes\" and it last about a minute or 2. She states that her brother would hold her until her shaking stopped and on one instance, she \"peed herself\" she states that she is concerned that it may be because she is out of her medication. She takes Ambien, Spiriva inhaler for her COPD and Klonopin. She has been out of the Klonopin for about 3 weeks and she has been on that medication since age 26 for severe anxiety. Admits to some nausea and diarrhea that started about 2 weeks ago. There is not been any vomiting. She does state her appetite is decreased as well. NursingNotes were reviewed. REVIEW OF SYSTEMS    (2-9 systems for level 4, 10 or more for level 5)     Review of Systems   Constitutional: Positive for diaphoresis (Intermittent).  Negative for activity change, fatigue and fever.   Eyes: Negative. Respiratory: Positive for cough (Neck secondary to COPD) and shortness of breath (Chronic secondary to COPD). Cardiovascular: Negative for chest pain and palpitations. Gastrointestinal: Positive for diarrhea. Negative for abdominal pain, nausea and vomiting. Genitourinary: Negative for dysuria and flank pain. Musculoskeletal: Negative. Skin: Negative. Neurological: Positive for tremors. Psychiatric/Behavioral: Positive for decreased concentration. Negative for behavioral problems, self-injury and suicidal ideas. The patient is nervous/anxious. All other systems reviewed and are negative. PAST MEDICALHISTORY     Past Medical History:   Diagnosis Date    COPD (chronic obstructive pulmonary disease) ([facility] [address]     GERD (gastroesophageal reflux disease)     History of MI (myocardial infarction) 2003    History of seizure     years ago    Scoliosis          SURGICAL HISTORY       Past Surgical History:   Procedure Laterality Date    COLONOSCOPY N/A 02/11/2021    Dr Chas Topete-Incomplete due due to poor prep    ENDOSCOPIC ULTRASOUND (LOWER) N/A 01/12/2021    Dr Chas Topete-Common bile duct dilation of unclear etiology, questionably due to chronic opioid use, gastritis    TONSILLECTOMY      UPPER GASTROINTESTINAL ENDOSCOPY  01/12/2021    Dr ZAIDA Topete-w/EUS         CURRENT MEDICATIONS     Previous Medications    ALBUTEROL IN    Inhale into the lungs    ALENDRONATE (FOSAMAX) 70 MG TABLET    Take 70 mg by mouth once a week    ATORVASTATIN (LIPITOR) 20 MG TABLET    Take 1 tablet by mouth nightly    BUPRENORPHINE-NALOXONE (SUBOXONE) 8-2 MG SUBL SL TABLET    DISSOLVE 1 AND 1/2 TABLET UNDER THE TONGUE ONCE DAILY FOR 5 DAYS    CLONAZEPAM (KLONOPIN) 0.5 MG TABLET    Take 1 mg by mouth 2 times daily as needed. NITROGLYCERIN (NITROSTAT) 0.4 MG SL TABLET    up to max of 3 total doses. If no relief after 1 dose, call 911.     PANTOPRAZOLE (PROTONIX) 40 MG TABLET    Take 1 tablet by mouth every morning (before breakfast)    TIOTROPIUM (SPIRIVA) 18 MCG INHALATION CAPSULE    Inhale 18 mcg into the lungs daily    ZOLPIDEM (AMBIEN) 10 MG TABLET    Take 5-10 mg by mouth nightly. ALLERGIES     Aspirin, Ibuprofen, Iodine, and Sulfa antibiotics    FAMILY HISTORY       Family History   Problem Relation Age of Onset    Colon Cancer Neg Hx     Colon Polyps Neg Hx           SOCIAL HISTORY       Social History     Socioeconomic History    Marital status:      Spouse name: None    Number of children: None    Years of education: None    Highest education level: None   Occupational History    None   Tobacco Use    Smoking status: Current Every Day Smoker     Packs/day: 1.00     Types: Cigarettes    Smokeless tobacco: Never Used   Vaping Use    Vaping Use: Never used   Substance and Sexual Activity    Alcohol use: Never    Drug use: Never    Sexual activity: None   Other Topics Concern    None   Social History Narrative    None     Social Determinants of Health     Financial Resource Strain:     Difficulty of Paying Living Expenses: Not on file   Food Insecurity:     Worried About Running Out of Food in the Last Year: Not on file    Larry of Food in the Last Year: Not on file   Transportation Needs:     Lack of Transportation (Medical): Not on file    Lack of Transportation (Non-Medical):  Not on file   Physical Activity:     Days of Exercise per Week: Not on file    Minutes of Exercise per Session: Not on file   Stress:     Feeling of Stress : Not on file   Social Connections:     Frequency of Communication with Friends and Family: Not on file    Frequency of Social Gatherings with Friends and Family: Not on file    Attends [employer] Services: Not on file    Active Member of Clubs or Organizations: Not on file    Attends Club or Organization Meetings: Not on file    Marital Status: Not on file   Intimate Partner Violence:     Fear of Current or Ex-Partner: Not on file    Emotionally Abused: Not on file    Physically Abused: Not on file    Sexually Abused: Not on file   Housing Stability:     Unable to Pay for Housing in the Last Year: Not on file    Number of [place] in the Last Year: Not on file    Unstable Housing in the Last Year: Not on file       SCREENINGS    Terri Coma Scale  Eye Opening: Spontaneous  Best Verbal Response: Oriented  Best Motor Response: Obeys commands  [place] Coma Scale Score: 15        PHYSICAL EXAM    (up to 7 for level 4, 8 or more for level 5)     ED Triage Vitals [06/16/22 1655]   BP Temp Temp Source Heart Rate Resp SpO2 Height Weight   130/83 98.4 °F (36.9 °C) Oral 81 16 96 % 5' 1\" (1.549 m) 94 lb (42.6 kg)       Physical Exam  Vitals and nursing note reviewed. Constitutional:       General: She is not in acute distress. Appearance: Normal appearance. She is not toxic-appearing. HENT:      Head: Normocephalic and atraumatic. Right Ear: External ear normal.      Left Ear: External ear normal.   Eyes:      Extraocular Movements: Extraocular movements intact. Conjunctiva/sclera: Conjunctivae normal.      Pupils: Pupils are equal, round, and reactive to light. Cardiovascular:      Rate and Rhythm: Normal rate and regular rhythm. Pulses: Normal pulses. Heart sounds: Normal heart sounds. Pulmonary:      Effort: Pulmonary effort is normal.      Breath sounds: Normal breath sounds. No wheezing, rhonchi or rales. Abdominal:      General: Abdomen is flat. Bowel sounds are normal. There is no distension. Palpations: Abdomen is soft. There is no mass. Tenderness: There is no abdominal tenderness. There is no guarding or rebound. Musculoskeletal:         General: No swelling, tenderness, deformity or signs of injury. Normal range of motion. Skin:     General: Skin is warm. Capillary Refill: Capillary refill takes less than 2 seconds. Findings: No lesion or rash.    Neurological: General: No focal deficit present. Mental Status: She is alert and oriented to person, place, and time. GCS: GCS eye subscore is 4. GCS verbal subscore is 5. GCS motor subscore is 6. Cranial Nerves: No cranial nerve deficit. Sensory: No sensory deficit. Motor: Motor function is intact. No weakness, seizure activity or pronator drift. Coordination: Coordination normal.      Gait: Gait normal.      Comments: Mildly tremulous   Psychiatric:         Attention and Perception: Attention normal. She does not perceive auditory or visual hallucinations. Mood and Affect: Mood is anxious. Speech: Speech normal.         Behavior: Behavior normal.         Thought Content: Thought content normal. Thought content is not paranoid or delusional. Thought content does not include homicidal ideation. Thought content does not include homicidal plan. Cognition and Memory: Cognition normal.         Judgment: Judgment normal.         DIAGNOSTIC RESULTS     EKG: All EKG's areinterpreted by the Emergency Department Physician who either signs or Co-signs this chart in the absence of a cardiologist.    EKG @ 1824  Rate 64  Rhythm: Normal sinus  Interpretation, NSR, old anterior infarct. No ST elevation    RADIOLOGY:  Non-plain film images such as CT, Ultrasound and MRI are read by the radiologist. Plain radiographic images are visualized and preliminarily interpreted bythe emergency physician with the below findings:          XR CHEST PORTABLE   Final Result   1. No significant interval change. 2. Stable bilateral lung hyperinflation. Recommendation:  Follow up as clinically indicated. Electronically Signed by Tao Christianson MD at 16-Jun-2022 07:53:34 PM               CT HEAD WO CONTRAST   Final Result   1. Negative unenhanced CT head. Recommendation: Follow up as clinically indicated.    All CT scans at this facility utilize dose modulation, iterative reconstruction, and/or weight based dosing when appropriate to reduce radiation dose to as low as reasonably achievable.    Electronically Signed by Alana Lawrence MD at 16-Jun-2022 08:36:21 PM                       LABS:  Results for orders placed or performed during the hospital encounter of 06/16/22   Urine Drug Screen   Result Value Ref Range    Amphetamine Screen, Urine Negative Negative <1000 ng/mL    Barbiturate Screen, Ur Negative Negative < 200 ng/mL    Benzodiazepine Screen, Urine Negative Negative <100 ng/mL    Cannabinoid Scrn, Ur Negative Negative <50 ng/mL    Cocaine Metabolite Screen, Urine Negative Negative <300 ng/mL    Opiate Scrn, Ur Negative Negative < 300 ng/mL    Drug Screen Comment: see below    CBC with Auto Differential   Result Value Ref Range    WBC 9.2 4.8 - 10.8 K/uL    RBC 4.76 4.20 - 5.40 M/uL    Hemoglobin 13.5 12.0 - 16.0 g/dL    Hematocrit 43.1 37.0 - 47.0 %    MCV 90.5 81.0 - 99.0 fL    MCH 28.4 27.0 - 31.0 pg    MCHC 31.3 (L) 33.0 - 37.0 g/dL    RDW 12.8 11.5 - 14.5 %    Platelets 367 (H) 438 - 400 K/uL    MPV 8.5 (L) 9.4 - 12.3 fL    Neutrophils % 64.9 50.0 - 65.0 %    Lymphocytes % 24.8 20.0 - 40.0 %    Monocytes % 8.4 0.0 - 10.0 %    Eosinophils % 0.9 0.0 - 5.0 %    Basophils % 0.7 0.0 - 1.0 %    Neutrophils Absolute 6.0 1.5 - 7.5 K/uL    Immature Granulocytes # 0.0 K/uL    Lymphocytes Absolute 2.3 1.1 - 4.5 K/uL    Monocytes Absolute 0.80 0.00 - 0.90 K/uL    Eosinophils Absolute 0.10 0.00 - 0.60 K/uL    Basophils Absolute 0.10 0.00 - 0.20 K/uL   Protime-INR   Result Value Ref Range    Protime 11.8 (L) 12.0 - 14.6 sec    INR 0.88 0.88 - 1.18   SPECIMEN REJECTION   Result Value Ref Range    Rejected Test cmpx,trop     Reason for Rejection see below    Comprehensive Metabolic Panel w/ Reflex to MG   Result Value Ref Range    Sodium 138 136 - 145 mmol/L    Potassium reflex Magnesium 4.4 3.5 - 5.0 mmol/L    Chloride 100 98 - 111 mmol/L    CO2 30 (H) 22 - 29 mmol/L    Anion Gap 8 7 - 19 mmol/L Glucose 89 74 - 109 mg/dL    BUN 11 6 - 20 mg/dL    CREATININE 0.6 0.5 - 0.9 mg/dL    GFR Non-African American >60 >60    GFR African American >59 >59    Calcium 9.9 8.6 - 10.0 mg/dL    Total Protein 6.9 6.6 - 8.7 g/dL    Albumin 4.4 3.5 - 5.2 g/dL    Total Bilirubin <0.2 0.2 - 1.2 mg/dL    Alkaline Phosphatase 105 (H) 35 - 104 U/L    ALT 12 5 - 33 U/L    AST 19 5 - 32 U/L   Troponin   Result Value Ref Range    Troponin <0.01 0.00 - 0.03 ng/mL         All other labs were within normal range or not returned as of this dictation. EMERGENCY DEPARTMENT COURSE and DIFFERENTIAL DIAGNOSIS/MDM:   Vitals:    Vitals:    06/16/22 1655   BP: 130/83   Pulse: 81   Resp: 16   Temp: 98.4 °F (36.9 °C)   TempSrc: Oral   SpO2: 96%   Weight: 94 lb (42.6 kg)   Height: 5' 1\" (1.549 m)       MDM  Number of Diagnoses or Management Options  Diagnosis management comments: Patient with what sounds to be benzodiazepine withdrawal.  The patient has been on Klonopin for more than 20 years secondary to anxiety. She recently ran out about 3 weeks ago, 2 weeks ago began having episodes of feeling shaky, oriented, occasionally sweating. She states that she has had intermittent diarrhea as well but no vomiting. Her appetite has been suppressed and she states she has had what she believes have been a couple of seizures over the last week, most recent one last night. These are witnessed by her brother. She states that he says she shakes and they last about a minute or so. Is alert and oriented       Amount and/or Complexity of Data Reviewed  Clinical lab tests: ordered and reviewed  Tests in the medicine section of CPT®: reviewed and ordered  Review and summarize past medical records: yes  Independent visualization of images, tracings, or specimens: yes        Reassessment  8:46 PM CDT PDMP database was reviewed. There have been no lorazepam equivalent benzo prescriptions nor morphine equivalent prescriptions in the last 90 days.   The the patient reports that she is already contacted social service who is going to get her within the [facility] primary care system so that she can obtain a primary care provider. Patient was medicated with oral benzodiazepines here, observed for 4 hours. There was no seizure activity and subjectively she states she feels much improved. She is no longer tremulous, or stable. Plan discharge    CONSULTS:  None    PROCEDURES:  Unless otherwise noted below, none     Procedures    FINAL IMPRESSION      1. Benzodiazepine withdrawal without complication ([address]    2. Generalized anxiety disorder          DISPOSITION/PLAN   DISPOSITION Decision To Discharge 06/16/2022 09:11:57 PM      PATIENT REFERRED TO:  [address] [place] [id number] [phone]  Schedule an appointment as soon as possible for a visit         DISCHARGE MEDICATIONS:  New Prescriptions    CLONAZEPAM (KLONOPIN) 0.5 MG TABLET    Take 1 tablet by mouth 2 times daily for 15 days. TIOTROPIUM (SPIRIVA HANDIHALER) 18 MCG INHALATION CAPSULE    Inhale 1 capsule into the lungs daily    ZOLPIDEM (AMBIEN) 5 MG TABLET    Take 1 tablet by mouth nightly as needed for Sleep for up to 5 days.           (Please note that portions of this note were completed with a voice recognition program.  Efforts were made to edit thedictations but occasionally words are mis-transcribed.)    Jessenia Nava MD (electronically signed)  Attending Emergency Physician       Jessenia Nava MD  06/16/22 0638

## 2022-06-17 LAB
EKG P AXIS: 75 DEGREES
EKG P-R INTERVAL: 178 MS
EKG Q-T INTERVAL: 396 MS
EKG QRS DURATION: 70 MS
EKG QTC CALCULATION (BAZETT): 403 MS
EKG T AXIS: 46 DEGREES

## 2022-06-17 PROCEDURE — 93010 ELECTROCARDIOGRAM REPORT: CPT | Performed by: INTERNAL MEDICINE

## (undated) DEVICE — Z DUPLICATE USE 2738952 SYSTEM VENT M AD NSL PAP DEV HD STRP 2L HYPRINFL BG MRI

## (undated) DEVICE — FORCEPS BX L240CM JAW DIA2.4MM ORNG L CAP W/ NDL DISP RAD

## (undated) DEVICE — ENDO KIT,LOURDES HOSPITAL: Brand: MEDLINE INDUSTRIES, INC.